# Patient Record
Sex: MALE | Race: WHITE | NOT HISPANIC OR LATINO | Employment: OTHER | URBAN - METROPOLITAN AREA
[De-identification: names, ages, dates, MRNs, and addresses within clinical notes are randomized per-mention and may not be internally consistent; named-entity substitution may affect disease eponyms.]

---

## 2023-05-22 DIAGNOSIS — E78.2 MIXED HYPERLIPIDEMIA: ICD-10-CM

## 2023-05-22 PROBLEM — I49.5 SINOATRIAL NODE DYSFUNCTION (MULTI): Status: ACTIVE | Noted: 2023-05-22

## 2023-05-22 PROBLEM — M54.12 CERVICAL RADICULOPATHY: Status: ACTIVE | Noted: 2023-05-22

## 2023-05-22 PROBLEM — H93.13 BILATERAL TINNITUS: Status: ACTIVE | Noted: 2023-05-22

## 2023-05-22 PROBLEM — H61.23 BILATERAL IMPACTED CERUMEN: Status: RESOLVED | Noted: 2023-05-22 | Resolved: 2023-05-22

## 2023-05-22 PROBLEM — C61 ADENOCARCINOMA OF PROSTATE (MULTI): Status: ACTIVE | Noted: 2023-05-22

## 2023-05-22 PROBLEM — G89.29 CHRONIC PAIN: Status: ACTIVE | Noted: 2023-05-22

## 2023-05-22 PROBLEM — R79.9 ABNORMAL BLOOD CHEMISTRY LEVEL: Status: ACTIVE | Noted: 2023-05-22

## 2023-05-22 PROBLEM — E03.8 SUBCLINICAL HYPOTHYROIDISM: Status: ACTIVE | Noted: 2023-05-22

## 2023-05-22 PROBLEM — R94.31 LONG QT INTERVAL: Status: ACTIVE | Noted: 2023-05-22

## 2023-05-22 PROBLEM — N40.1 BENIGN PROSTATIC HYPERPLASIA WITH LOWER URINARY TRACT SYMPTOMS: Status: ACTIVE | Noted: 2023-05-22

## 2023-05-22 PROBLEM — I45.81 LONG QT SYNDROME: Status: ACTIVE | Noted: 2023-05-22

## 2023-05-22 PROBLEM — H90.3 BILATERAL SENSORINEURAL HEARING LOSS: Status: ACTIVE | Noted: 2023-05-22

## 2023-05-22 PROBLEM — Z95.0 PRESENCE OF CARDIAC PACEMAKER: Status: ACTIVE | Noted: 2023-05-22

## 2023-05-22 PROBLEM — N52.9 ED (ERECTILE DYSFUNCTION): Status: ACTIVE | Noted: 2023-05-22

## 2023-05-22 PROBLEM — E55.9 VITAMIN D INSUFFICIENCY: Status: ACTIVE | Noted: 2023-05-22

## 2023-05-22 RX ORDER — SIMVASTATIN 40 MG/1
TABLET, FILM COATED ORAL
Qty: 90 TABLET | Refills: 3 | Status: SHIPPED | OUTPATIENT
Start: 2023-05-22

## 2023-05-22 RX ORDER — BICALUTAMIDE 50 MG/1
1 TABLET, FILM COATED ORAL DAILY
COMMUNITY
Start: 2021-06-10 | End: 2023-11-20 | Stop reason: SDUPTHER

## 2023-05-22 RX ORDER — ACETAMINOPHEN 500 MG
1 TABLET ORAL DAILY
COMMUNITY

## 2023-05-22 RX ORDER — HYDROCORTISONE 25 MG/G
CREAM TOPICAL 2 TIMES DAILY
COMMUNITY
Start: 2022-06-21 | End: 2023-11-24 | Stop reason: WASHOUT

## 2023-05-22 RX ORDER — ALFUZOSIN HYDROCHLORIDE 10 MG/1
1 TABLET, EXTENDED RELEASE ORAL DAILY
COMMUNITY
Start: 2023-02-14 | End: 2024-04-11 | Stop reason: SDUPTHER

## 2023-05-22 RX ORDER — SIMVASTATIN 40 MG/1
1 TABLET, FILM COATED ORAL DAILY
COMMUNITY
Start: 2015-02-11 | End: 2023-10-19 | Stop reason: WASHOUT

## 2023-10-02 PROBLEM — Z04.9 CONDITION NOT FOUND: Status: ACTIVE | Noted: 2023-10-02

## 2023-10-02 PROBLEM — R55 SYNCOPE: Status: ACTIVE | Noted: 2023-10-02

## 2023-10-02 RX ORDER — FINASTERIDE 5 MG/1
1 TABLET, FILM COATED ORAL DAILY
COMMUNITY
Start: 2023-07-21 | End: 2023-11-24 | Stop reason: SDUPTHER

## 2023-10-04 ENCOUNTER — HOSPITAL ENCOUNTER (OUTPATIENT)
Dept: CARDIOLOGY | Facility: HOSPITAL | Age: 88
Discharge: HOME | End: 2023-10-04
Payer: MEDICARE

## 2023-10-04 DIAGNOSIS — I45.81 LONG Q-T SYNDROME: ICD-10-CM

## 2023-10-04 DIAGNOSIS — I47.20 VT (VENTRICULAR TACHYCARDIA) (MULTI): ICD-10-CM

## 2023-10-05 ENCOUNTER — APPOINTMENT (OUTPATIENT)
Dept: UROLOGY | Facility: CLINIC | Age: 88
End: 2023-10-05
Payer: MEDICARE

## 2023-10-06 DIAGNOSIS — I49.5 SINOATRIAL NODE DYSFUNCTION (MULTI): ICD-10-CM

## 2023-10-06 DIAGNOSIS — Z95.0 PRESENCE OF CARDIAC PACEMAKER: Primary | ICD-10-CM

## 2023-10-19 ENCOUNTER — OFFICE VISIT (OUTPATIENT)
Dept: UROLOGY | Facility: CLINIC | Age: 88
End: 2023-10-19
Payer: MEDICARE

## 2023-10-19 VITALS
HEART RATE: 80 BPM | SYSTOLIC BLOOD PRESSURE: 100 MMHG | BODY MASS INDEX: 22.22 KG/M2 | TEMPERATURE: 98.1 F | WEIGHT: 168.4 LBS | DIASTOLIC BLOOD PRESSURE: 67 MMHG

## 2023-10-19 DIAGNOSIS — N40.1 BENIGN PROSTATIC HYPERPLASIA WITH URINARY RETENTION: Primary | ICD-10-CM

## 2023-10-19 DIAGNOSIS — R33.8 BENIGN PROSTATIC HYPERPLASIA WITH URINARY RETENTION: Primary | ICD-10-CM

## 2023-10-19 LAB
POC APPEARANCE, URINE: CLEAR
POC BILIRUBIN, URINE: NEGATIVE
POC BLOOD, URINE: NEGATIVE
POC COLOR, URINE: YELLOW
POC GLUCOSE, URINE: NEGATIVE MG/DL
POC KETONES, URINE: NEGATIVE MG/DL
POC LEUKOCYTES, URINE: NEGATIVE
POC NITRITE,URINE: NEGATIVE
POC PH, URINE: 6 PH
POC PROTEIN, URINE: NEGATIVE MG/DL
POC SPECIFIC GRAVITY, URINE: 1.02
POC UROBILINOGEN, URINE: 0.2 EU/DL

## 2023-10-19 PROCEDURE — 1126F AMNT PAIN NOTED NONE PRSNT: CPT | Performed by: NURSE PRACTITIONER

## 2023-10-19 PROCEDURE — 51798 US URINE CAPACITY MEASURE: CPT | Performed by: NURSE PRACTITIONER

## 2023-10-19 PROCEDURE — 99213 OFFICE O/P EST LOW 20 MIN: CPT | Performed by: NURSE PRACTITIONER

## 2023-10-19 PROCEDURE — 81003 URINALYSIS AUTO W/O SCOPE: CPT | Performed by: NURSE PRACTITIONER

## 2023-10-19 ASSESSMENT — ENCOUNTER SYMPTOMS
WEAKNESS: 0
FATIGUE: 0
ACTIVITY CHANGE: 0
DIFFICULTY URINATING: 0

## 2023-10-19 NOTE — PROGRESS NOTES
"UROLOGIC FOLLOW-UP VISIT     PROBLEM LIST:  1. Benign prostatic hyperplasia with urinary retention  POCT UA Automated manually resulted    Measure post void residual           HISTORY OF PRESENT ILLNESS:   Yayo Fraga Jr. is a 88 y.o. with hx oligometastatic prostate cancer on bicalutamide. Also with obstructive LUTS.  at most recent visit 7/21/23. Was taking alfuzosin \"casually\" at that time.    INTERVAL HISTORY:  Seen accompanied by daughter, Leatha  Reports he has been taking both alfuzosin and finasteride daily  Previously concerned alfuzosin was causing lethargy; denies this now  Feels he's voiding reasonably well    PAST MEDICAL HISTORY:  Past Medical History:   Diagnosis Date    Personal history of other diseases of the digestive system 01/02/2018    History of diverticulosis    Sick sinus syndrome (CMS/HCC) 01/02/2018    Sick sinus syndrome       PAST SURGICAL HISTORY:  Past Surgical History:   Procedure Laterality Date    CARDIAC PACEMAKER PLACEMENT  02/11/2015    Pacemaker Placement    COLONOSCOPY  02/11/2015    Complete Colonoscopy    HERNIA REPAIR  02/11/2015    Hernia Repair    OTHER SURGICAL HISTORY  01/02/2018    Needle Biopsy Of Prostate        ALLERGIES:   No Known Allergies     MEDICATIONS:   Current Outpatient Medications on File Prior to Visit   Medication Sig Dispense Refill    alfuzosin (Uroxatral) 10 mg 24 hr tablet Take 1 tablet (10 mg) by mouth once daily.      bicalutamide (Casodex) 50 mg tablet Take 1 tablet (50 mg total) by mouth once daily.      cholecalciferol (Vitamin D-3) 50 mcg (2,000 unit) capsule Take 1 capsule (50 mcg) by mouth once daily.      finasteride (Proscar) 5 mg tablet Take 1 tablet (5 mg) by mouth once daily.      fish oil concentrate (Omega-3) 120-180 mg capsule Omega-3 Fish Oil 1000 MG Oral Capsule  Refills: 0 [id number], APRN-CNS Leigh Start: 2-Jun-2023      hydrocortisone 2.5 % cream Apply topically 2 times a day.      LYCOPENE ORAL Lycopene Oral " Capsule  Refills: 0 [id number], APRN-CNS Leigh Start: 2-Dec-2022      simvastatin (Zocor) 40 mg tablet TAKE 1 TABLET BY MOUTH EVERY DAY 90 tablet 3    simvastatin (Zocor) 40 mg tablet Take 1 tablet (40 mg) by mouth once daily.       No current facility-administered medications on file prior to visit.        SOCIAL HISTORY:  Patient     Social History     Socioeconomic History    Marital status:      Spouse name: Not on file    Number of children: Not on file    Years of education: Not on file    Highest education level: Not on file   Occupational History    Not on file   Tobacco Use    Smoking status: Not on file    Smokeless tobacco: Not on file   Substance and Sexual Activity    Alcohol use: Not on file    Drug use: Not on file    Sexual activity: Not on file   Other Topics Concern    Not on file   Social History Narrative    Not on file     Social Determinants of Health     Financial Resource Strain: Not on file   Food Insecurity: Not on file   Transportation Needs: Not on file   Physical Activity: Not on file   Stress: Not on file   Social Connections: Not on file   Intimate Partner Violence: Not on file   Housing Stability: Not on file       FAMILY HISTORY:  Family History   Problem Relation Name Age of Onset    Alzheimer's disease Mother      Alzheimer's disease Mother's Sister      Alzheimer's disease Maternal Grandmother         REVIEW OF SYSTEMS:  Review of Systems   Constitutional:  Negative for activity change and fatigue.   Genitourinary:  Negative for difficulty urinating.   Neurological:  Negative for weakness.       PHYSICAL EXAM:  Visit Vitals  /67   Pulse 80   Temp 36.7 °C (98.1 °F)     Constitutional: Patient appears well-developed and well-nourished. No distress.    Head: Normocephalic and atraumatic.    Neck: Normal range of motion.    Cardiovascular: Normal rate.    Pulmonary/Chest: Effort normal. No respiratory distress.   Abdominal: Nondistended  : See  below  Musculoskeletal: Normal range of motion.    Neurological: Alert and oriented to person, place, and time.  Psychiatric: Normal mood and affect. Behavior is normal. Thought content normal.      RADIOLOGY REVIEW:  [unfilled]    LABORATORY REVIEW:   Lab Results   Component Value Date    BUN 26 (H) 08/28/2023    CREATININE 1.22 08/28/2023     08/28/2023    K 4.4 08/28/2023     08/28/2023    CO2 30 08/28/2023    CALCIUM 9.0 08/28/2023      Lab Results   Component Value Date    WBC 6.3 08/28/2023    RBC 4.53 08/28/2023    HGB 13.8 08/28/2023    HCT 41.5 08/28/2023    MCV 92 08/28/2023    MCHC 33.3 08/28/2023    RDW 13.2 08/28/2023     08/28/2023        Lab Results   Component Value Date    PSA 2.73 08/28/2023    PSA 4.36 (H) 08/14/2023    PSA 3.90 05/04/2023    PSA 3.78 01/31/2023    PSA 4.49 (H) 11/07/2022    PSA 5.75 (H) 07/05/2022    PSA 5.43 (H) 03/01/2022    PSA 6.02 (H) 10/08/2021    PSA 8.68 (H) 07/29/2021    PSA 11.86 (H) 07/08/2021    PSA 34.67 (H) 05/26/2021    PSA 28.16 (H) 02/03/2021    PSA 22.52 (H) 11/19/2020    PSA 23.83 (H) 08/07/2020    PSA 20.92 (H) 06/19/2020    PSA 21.36 (H) 02/07/2020    PSA 23.97 (H) 01/31/2020    PSA 22.91 (H) 11/18/2019    PSA 26.08 (H) 07/31/2019    PSA 21.76 (H) 01/22/2019    PSA 20.95 (H) 08/06/2018    PSA 20.74 (H) 05/14/2018    PSA 22.97 (H) 01/30/2018    PSA 21.71 (H) 11/02/2017        Urine dipstick shows negative for all components.         Assessment:      1. Benign prostatic hyperplasia with urinary retention  POCT UA Automated manually resulted    Measure post void residual          Yayo Flakito Mcclure is a 88 y.o. with hx oligometastatic prostate cancer on bicalutamide. Also with obstructive LUTS.  at most recent visit 7/21/23; 77 today. PSA improved as above; continues to follow with oncology.     Plan:   Continue alfuzosin & finasteride daily  RTC in 6 months  Encouraged to call in the interim with any questions, concerns

## 2023-11-20 ENCOUNTER — TELEPHONE (OUTPATIENT)
Dept: ADMISSION | Facility: HOSPITAL | Age: 88
End: 2023-11-20
Payer: MEDICARE

## 2023-11-20 ENCOUNTER — APPOINTMENT (OUTPATIENT)
Dept: HEMATOLOGY/ONCOLOGY | Facility: HOSPITAL | Age: 88
End: 2023-11-20
Payer: MEDICARE

## 2023-11-20 DIAGNOSIS — C61 ADENOCARCINOMA OF PROSTATE (MULTI): Primary | ICD-10-CM

## 2023-11-20 RX ORDER — BICALUTAMIDE 50 MG/1
50 TABLET, FILM COATED ORAL DAILY
Qty: 30 TABLET | Refills: 11 | Status: SHIPPED | OUTPATIENT
Start: 2023-11-20 | End: 2023-11-24 | Stop reason: SDUPTHER

## 2023-11-21 ENCOUNTER — TELEPHONE (OUTPATIENT)
Dept: UROLOGY | Facility: CLINIC | Age: 88
End: 2023-11-21
Payer: MEDICARE

## 2023-11-21 DIAGNOSIS — R33.8 BENIGN PROSTATIC HYPERPLASIA WITH URINARY RETENTION: Primary | ICD-10-CM

## 2023-11-21 DIAGNOSIS — N40.1 BENIGN PROSTATIC HYPERPLASIA WITH URINARY RETENTION: Primary | ICD-10-CM

## 2023-11-21 NOTE — PROGRESS NOTES
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 Arroyo, declined additional therapy and remained on Casodex  alone, PSA 1/23 3.78, 5/23 3.9     Provider Team  No ref. provider found   MD Kendall Salinas  PCP - Lupis Valente  EP / Cardiology f/u   Urology [facility]     Other contributing history  -Hypothyroidism, sick sinus syndrome, pacemaker, osteopenia, long qt syndrome, urinary incontinence ( finasteride, alfuzosin)       Interval history:  The patient presents for follow up.  Accompanied by his daughter, still some confusion yet notes no major new complaints.  He notes since starting back on the finasteride and the alfuzosin, some improvement of his urinary symptoms.  Denies any ongoing complications with the bicalutamide.  Denies any other major new symptoms.  Denies any nausea, vomiting, fevers, chills.    ROS:  10-pt ROS reviewed and negative except as mentioned above.    Medications: below was reviewed and is accurate  Current Outpatient Medications   Medication Instructions    alfuzosin (Uroxatral) 10 mg 24 hr tablet 1 tablet, oral, Daily    bicalutamide (CASODEX) 50 mg, oral, Daily    cholecalciferol (Vitamin D-3) 50 mcg (2,000 unit) capsule 1 capsule, oral, Daily    finasteride (Proscar) 5 mg tablet 1  tablet, oral, Daily    fish oil concentrate (Omega-3) 120-180 mg capsule Omega-3 Fish Oil 1000 MG Oral Capsule  Refills: 0 CORNEL Zhong-LOULOU Leigh Start: 2-Jun-2023    hydrocortisone 2.5 % cream Topical, 2 times daily    LYCOPENE ORAL Lycopene Oral Capsule  Refills: 0 NICKOLAS Zhong Leigh Start: 2-Dec-2022    simvastatin (Zocor) 40 mg tablet TAKE 1 TABLET BY MOUTH EVERY DAY        Detailed family history and social history reviewed in detail and updated per epic charting and in detail with the patient    Physical exam:  Vitals:    11/24/23 1211   BP: 126/65   BP Location: Left arm   Patient Position: Sitting   BP Cuff Size: Adult   Pulse: 66   Resp: 18   Temp: 36 °C (96.8 °F)   TempSrc: Temporal   SpO2: 99%   Weight: 75.4 kg (166 lb 3.6 oz)       GEN: NAD, well-appearing confusion yet able to answer questions  HEENT: no clear lesions seen  NECK: no clear masses  LYMPH: no palpable adenopathy  SKIN: no concerning new lesions  LUNGS: CTA  CV: RRR no clear R/G  ABD: Soft, NTND. No rebound or guarding.  EXT:  trace edema bilaterally   NEURO: Grossly intact. No focal deficits.  PSYCH: Normal mood and affect    Radiology review  Reviewed in detail personally and for detail see results in EPIC        Genomics Data    Laboratory review  Reviewed in detail personally and for detail see results in [facility]  Lab Results   Component Value Date    WBC 6.9 11/22/2023    WBC 6.3 08/28/2023    WBC 7.1 08/14/2023     Lab Results   Component Value Date    HGB 13.9 11/22/2023    HGB 13.8 08/28/2023    HGB 13.9 08/14/2023     Lab Results   Component Value Date     11/22/2023     08/28/2023     08/14/2023     Lab Results   Component Value Date    GLUCOSE 102 (H) 11/22/2023    CALCIUM 8.9 11/22/2023     11/22/2023    K 4.1 11/22/2023    CO2 29 11/22/2023     11/22/2023    BUN 19 11/22/2023    CREATININE 1.26 11/22/2023     Lab Results   Component Value Date    PSA 1.13 11/22/2023    PSA 2.73  08/28/2023    PSA 4.36 (H) 08/14/2023     Lab Results   Component Value Date    ALT 8 (L) 11/22/2023    AST 15 11/22/2023    ALKPHOS 52 11/22/2023    BILITOT 0.9 11/22/2023     Lab Results   Component Value Date    TESTOSTERONE 920 11/22/2023         ASSESSMENT AND PLAN     PCA -the PSA has improved again no change in therapy wants to continue on the Casodex the decrease in the PSA could have been potentially related also to the finasteride.  Discussed the of escalating therapy including ADT and/or repeat imaging risk benefits and alternatives and for now due to his age and other comorbidities wanted to just stay on the Casodex continue to monitor toxicities I will arrange a follow-up in 6 months and arrange labs prior he will contact us for any other symptoms.  CT findings - role of repeat imaging discussed in regards to nonspecific pulmonary and pancreas findings and refused intervention.  Prefers to monitor aware of the risk of progression no worsening symptoms  LUTS again continue follow-up with urology on a regular basis and ongoing use of medications.  With benefits of the finasteride.      Burt Arroyo MD  Senior Attending Physician/  in Medicine [employer]  [facility] / [facility]  Patient line [phone]  Fax [phone]

## 2023-11-21 NOTE — TELEPHONE ENCOUNTER
"Jarett Mohanrodriguez,    Patient's wife is calling and asking for a med refill     finasteride (Proscar) 5 mg tablet [[id number]]    Order Details  Dose: 1 tablet Route: oral Frequency: Daily   Dispense Quantity: -- Refills: --          Sig: Take 1 tablet (5 mg) by mouth once daily.     I cannot be sure if you were actually the one to order it since due to the EPIC transition it just says \"historical\"    Last ordered 7/21/23  "

## 2023-11-22 ENCOUNTER — LAB (OUTPATIENT)
Dept: LAB | Facility: HOSPITAL | Age: 88
End: 2023-11-22
Payer: MEDICARE

## 2023-11-22 DIAGNOSIS — C61 PROSTATE CANCER (MULTI): Primary | ICD-10-CM

## 2023-11-22 DIAGNOSIS — C61 PROSTATE CANCER (MULTI): ICD-10-CM

## 2023-11-22 LAB
ALBUMIN SERPL BCP-MCNC: 4.1 G/DL (ref 3.4–5)
ALP SERPL-CCNC: 52 U/L (ref 33–136)
ALT SERPL W P-5'-P-CCNC: 8 U/L (ref 10–52)
ANION GAP SERPL CALC-SCNC: 11 MMOL/L (ref 10–20)
AST SERPL W P-5'-P-CCNC: 15 U/L (ref 9–39)
BASOPHILS # BLD AUTO: 0.03 X10*3/UL (ref 0–0.1)
BASOPHILS NFR BLD AUTO: 0.4 %
BILIRUB SERPL-MCNC: 0.9 MG/DL (ref 0–1.2)
BUN SERPL-MCNC: 19 MG/DL (ref 6–23)
CALCIUM SERPL-MCNC: 8.9 MG/DL (ref 8.6–10.3)
CHLORIDE SERPL-SCNC: 104 MMOL/L (ref 98–107)
CO2 SERPL-SCNC: 29 MMOL/L (ref 21–32)
CREAT SERPL-MCNC: 1.26 MG/DL (ref 0.5–1.3)
EOSINOPHIL # BLD AUTO: 0.19 X10*3/UL (ref 0–0.4)
EOSINOPHIL NFR BLD AUTO: 2.7 %
ERYTHROCYTE [DISTWIDTH] IN BLOOD BY AUTOMATED COUNT: 13.2 % (ref 11.5–14.5)
GFR SERPL CREATININE-BSD FRML MDRD: 55 ML/MIN/1.73M*2
GLUCOSE SERPL-MCNC: 102 MG/DL (ref 74–99)
HCT VFR BLD AUTO: 41.8 % (ref 41–52)
HGB BLD-MCNC: 13.9 G/DL (ref 13.5–17.5)
IMM GRANULOCYTES # BLD AUTO: 0.02 X10*3/UL (ref 0–0.5)
IMM GRANULOCYTES NFR BLD AUTO: 0.3 % (ref 0–0.9)
LYMPHOCYTES # BLD AUTO: 2.19 X10*3/UL (ref 0.8–3)
LYMPHOCYTES NFR BLD AUTO: 31.6 %
MCH RBC QN AUTO: 30.3 PG (ref 26–34)
MCHC RBC AUTO-ENTMCNC: 33.3 G/DL (ref 32–36)
MCV RBC AUTO: 91 FL (ref 80–100)
MONOCYTES # BLD AUTO: 0.67 X10*3/UL (ref 0.05–0.8)
MONOCYTES NFR BLD AUTO: 9.7 %
NEUTROPHILS # BLD AUTO: 3.84 X10*3/UL (ref 1.6–5.5)
NEUTROPHILS NFR BLD AUTO: 55.3 %
NRBC BLD-RTO: 0 /100 WBCS (ref 0–0)
PLATELET # BLD AUTO: 179 X10*3/UL (ref 150–450)
POTASSIUM SERPL-SCNC: 4.1 MMOL/L (ref 3.5–5.3)
PROT SERPL-MCNC: 6.9 G/DL (ref 6.4–8.2)
PSA SERPL-MCNC: 1.13 NG/ML
RBC # BLD AUTO: 4.59 X10*6/UL (ref 4.5–5.9)
SODIUM SERPL-SCNC: 140 MMOL/L (ref 136–145)
TESTOST SERPL-MCNC: 920 NG/DL (ref 240–1000)
WBC # BLD AUTO: 6.9 X10*3/UL (ref 4.4–11.3)

## 2023-11-22 PROCEDURE — 84153 ASSAY OF PSA TOTAL: CPT | Performed by: INTERNAL MEDICINE

## 2023-11-22 PROCEDURE — 85025 COMPLETE CBC W/AUTO DIFF WBC: CPT

## 2023-11-22 PROCEDURE — 36415 COLL VENOUS BLD VENIPUNCTURE: CPT

## 2023-11-22 PROCEDURE — 80053 COMPREHEN METABOLIC PANEL: CPT

## 2023-11-22 PROCEDURE — 84403 ASSAY OF TOTAL TESTOSTERONE: CPT | Performed by: INTERNAL MEDICINE

## 2023-11-24 ENCOUNTER — TELEPHONE (OUTPATIENT)
Dept: HEMATOLOGY/ONCOLOGY | Facility: HOSPITAL | Age: 88
End: 2023-11-24
Payer: MEDICARE

## 2023-11-24 ENCOUNTER — OFFICE VISIT (OUTPATIENT)
Dept: HEMATOLOGY/ONCOLOGY | Facility: HOSPITAL | Age: 88
End: 2023-11-24
Payer: MEDICARE

## 2023-11-24 VITALS
OXYGEN SATURATION: 99 % | HEART RATE: 66 BPM | DIASTOLIC BLOOD PRESSURE: 65 MMHG | WEIGHT: 166.23 LBS | TEMPERATURE: 96.8 F | BODY MASS INDEX: 21.93 KG/M2 | SYSTOLIC BLOOD PRESSURE: 126 MMHG | RESPIRATION RATE: 18 BRPM

## 2023-11-24 DIAGNOSIS — C61 ADENOCARCINOMA OF PROSTATE (MULTI): Primary | ICD-10-CM

## 2023-11-24 PROCEDURE — 1159F MED LIST DOCD IN RCRD: CPT | Performed by: INTERNAL MEDICINE

## 2023-11-24 PROCEDURE — 99214 OFFICE O/P EST MOD 30 MIN: CPT | Performed by: INTERNAL MEDICINE

## 2023-11-24 PROCEDURE — 1036F TOBACCO NON-USER: CPT | Performed by: INTERNAL MEDICINE

## 2023-11-24 PROCEDURE — 1126F AMNT PAIN NOTED NONE PRSNT: CPT | Performed by: INTERNAL MEDICINE

## 2023-11-24 PROCEDURE — 1160F RVW MEDS BY RX/DR IN RCRD: CPT | Performed by: INTERNAL MEDICINE

## 2023-11-24 RX ORDER — BICALUTAMIDE 50 MG/1
50 TABLET, FILM COATED ORAL DAILY
Qty: 30 TABLET | Refills: 11 | Status: SHIPPED | OUTPATIENT
Start: 2023-11-24

## 2023-11-24 RX ORDER — FINASTERIDE 5 MG/1
5 TABLET, FILM COATED ORAL DAILY
Qty: 90 TABLET | Refills: 3 | Status: SHIPPED | OUTPATIENT
Start: 2023-11-24

## 2023-11-24 ASSESSMENT — PAIN SCALES - GENERAL: PAINLEVEL: 0-NO PAIN

## 2023-11-24 ASSESSMENT — ENCOUNTER SYMPTOMS
LOSS OF SENSATION IN FEET: 0
DEPRESSION: 0
OCCASIONAL FEELINGS OF UNSTEADINESS: 0

## 2023-11-24 NOTE — TELEPHONE ENCOUNTER
Called patient and spoke to wife about the option to come a little earlier for patient appointment today with Dr. Arroyo. Patient's wife said they needed to confirm with their daughter, but appreciated the option. She said she will call back to confirm if they will come a little earlier. Office number provided to her.

## 2023-12-07 ENCOUNTER — OFFICE VISIT (OUTPATIENT)
Dept: GERIATRIC MEDICINE | Facility: CLINIC | Age: 88
End: 2023-12-07
Payer: MEDICARE

## 2023-12-07 VITALS
BODY MASS INDEX: 22.15 KG/M2 | DIASTOLIC BLOOD PRESSURE: 60 MMHG | WEIGHT: 167.9 LBS | HEART RATE: 60 BPM | SYSTOLIC BLOOD PRESSURE: 90 MMHG

## 2023-12-07 DIAGNOSIS — M54.12 CERVICAL RADICULOPATHY: ICD-10-CM

## 2023-12-07 DIAGNOSIS — E03.8 SUBCLINICAL HYPOTHYROIDISM: Primary | ICD-10-CM

## 2023-12-07 DIAGNOSIS — R79.9 ABNORMAL BLOOD CHEMISTRY LEVEL: ICD-10-CM

## 2023-12-07 DIAGNOSIS — C61 ADENOCARCINOMA OF PROSTATE (MULTI): ICD-10-CM

## 2023-12-07 PROCEDURE — 1159F MED LIST DOCD IN RCRD: CPT | Performed by: NURSE PRACTITIONER

## 2023-12-07 PROCEDURE — 84443 ASSAY THYROID STIM HORMONE: CPT | Performed by: NURSE PRACTITIONER

## 2023-12-07 PROCEDURE — 84439 ASSAY OF FREE THYROXINE: CPT | Performed by: NURSE PRACTITIONER

## 2023-12-07 PROCEDURE — 36415 COLL VENOUS BLD VENIPUNCTURE: CPT | Mod: PO | Performed by: NURSE PRACTITIONER

## 2023-12-07 PROCEDURE — 1126F AMNT PAIN NOTED NONE PRSNT: CPT | Performed by: NURSE PRACTITIONER

## 2023-12-07 PROCEDURE — 80053 COMPREHEN METABOLIC PANEL: CPT | Performed by: NURSE PRACTITIONER

## 2023-12-07 PROCEDURE — 1036F TOBACCO NON-USER: CPT | Performed by: NURSE PRACTITIONER

## 2023-12-07 PROCEDURE — 99213 OFFICE O/P EST LOW 20 MIN: CPT | Performed by: NURSE PRACTITIONER

## 2023-12-07 PROCEDURE — 1160F RVW MEDS BY RX/DR IN RCRD: CPT | Performed by: NURSE PRACTITIONER

## 2023-12-07 PROCEDURE — 99213 OFFICE O/P EST LOW 20 MIN: CPT | Mod: PO | Performed by: NURSE PRACTITIONER

## 2023-12-07 PROCEDURE — 85027 COMPLETE CBC AUTOMATED: CPT | Performed by: NURSE PRACTITIONER

## 2023-12-07 PROCEDURE — 82607 VITAMIN B-12: CPT | Performed by: NURSE PRACTITIONER

## 2023-12-07 ASSESSMENT — ENCOUNTER SYMPTOMS
SINUS PAIN: 0
DYSPHORIC MOOD: 0
CONFUSION: 0
CHOKING: 0
SHORTNESS OF BREATH: 0
SLEEP DISTURBANCE: 0
CONSTIPATION: 0
HEADACHES: 0
FREQUENCY: 0
LOSS OF SENSATION IN FEET: 0
PALPITATIONS: 0
UNEXPECTED WEIGHT CHANGE: 0
EYE DISCHARGE: 0
LIGHT-HEADEDNESS: 0
EYE ITCHING: 0
DIZZINESS: 0
OCCASIONAL FEELINGS OF UNSTEADINESS: 0
FEVER: 0
COUGH: 0
DYSURIA: 0
SINUS PRESSURE: 0
NECK PAIN: 1
DIARRHEA: 0
DEPRESSION: 0
DIFFICULTY URINATING: 0

## 2023-12-07 ASSESSMENT — PATIENT HEALTH QUESTIONNAIRE - PHQ9
2. FEELING DOWN, DEPRESSED OR HOPELESS: NOT AT ALL
SUM OF ALL RESPONSES TO PHQ9 QUESTIONS 1 AND 2: 0
1. LITTLE INTEREST OR PLEASURE IN DOING THINGS: NOT AT ALL

## 2023-12-07 NOTE — PROGRESS NOTES
"Subjective   Mr. Fraga 88 y.o. year old male who presents for follow up Geriatric visit, he also states he is now followed by [facility] for primary care  Consult Requested By: self  Reason for consultation or primary concern: geriatric follow up       PCP: Dr. Can Feldman- no longer following with Dr. Feldman    Last seen by Leigh Barrera CNP on 6/2/23. Per patient discussion/provider impressions;  1. Bilateral sensorineural hearing loss: Seen by ENT, had MRI to evaluate for tumor, MRI was unremarkable. Hearing was okay at time of exam  2. Adenocarcinoma of prostate: Now taking White Button Mushroom for supplement, followed by heme-onc. He continues on afluzosin and bicautiamide.  3. Chronic back and neck pain: has medical massage every 3 weeks with good results     Since last visit:   Seen by urology: PSA improved, last PVR: 77, no change to medications, follow up in 6 months (last visit was in June 2023).  Seen by heme-onc, Dr. Travis on 11/24/23: no changes to medications, follow up in 6 months.   Concerns today:  Notes tremor of hands, right more than left, paticularly when working with small objects, \"using my fine motor skills.\" Symptoms present for \"months.\" No concerns with daily ADLs, eating etc.. no spilling liquids. No other concerns     HPI     What matters most: staying active   Health Goals: staying active    Goals    None         Visual: glasses Yes Last exam: recent   Hearing: hearing Yes Last exam: followed by ENT- has hearing aids  Dental: dentures No Last exam: unsure     Sleep: how many hours at night no concerns with sleep , sleep aides: none    Is dependant or requires assistance in the following BADL: independent with all   Is dependant or requires assistance in the following Instrumental ADL: independent with all       Advanced Directives on file: <no information>    Review of Systems   Constitutional:  Negative for activity change, appetite change, fever and unexpected weight change. "   HENT:  Negative for drooling, sinus pressure and sinus pain.         Hearing aids are helping. MRI was negative for auditory tumor. Had recent molar removal- no difficulty chewing or swallowing after dental procedure   Eyes:  Negative for discharge and itching.        Wears eyeglasses    Respiratory:  Negative for cough, choking and shortness of breath.    Cardiovascular:  Negative for chest pain, palpitations and leg swelling.   Gastrointestinal:  Negative for constipation and diarrhea.   Genitourinary:  Negative for difficulty urinating, dysuria, frequency and urgency.        Followed by urology and heme-onc    Musculoskeletal:  Positive for neck pain.   Neurological:  Negative for dizziness, light-headedness and headaches.   Psychiatric/Behavioral:  Negative for behavioral problems, confusion, dysphoric mood and sleep disturbance.         Reports he has no concerns with his memory but needs to write things down    Voids 2x/night   Drinks 1 beer per week.     Objective   VS: 90/60, 60, 16     Physical Exam  Constitutional:       Appearance: Normal appearance.   HENT:      Head: Normocephalic and atraumatic.      Nose: Nose normal.   Eyes:      Extraocular Movements: Extraocular movements intact.      Conjunctiva/sclera: Conjunctivae normal.      Pupils: Pupils are equal, round, and reactive to light.   Cardiovascular:      Rate and Rhythm: Normal rate and regular rhythm.   Pulmonary:      Effort: Pulmonary effort is normal.   Abdominal:      General: Abdomen is flat. Bowel sounds are normal.      Palpations: Abdomen is soft.   Genitourinary:     Comments: Treated for BPH and adenocarcinoma of prostate. Symptoms well controlled. He is voiding 2x/night.  Musculoskeletal:         General: Normal range of motion.   Skin:     General: Skin is warm and dry.   Neurological:      General: No focal deficit present.      Mental Status: He is alert and oriented to person, place, and time.      Gait: Gait normal.       Comments: Bilateral hand tremor with movement, R>L. No tenderness in wrists, hand grasps equal, sensation intact to light touch, finger to nose testing intact, rapid alternating movements intact.    Psychiatric:         Mood and Affect: Mood normal.         Thought Content: Thought content normal.         Assessment/Plan   Bilateral sensorineural hearing loss/bilateral tinnitus  - MRI negative   - Mr. Fraga is now wearing hearing aides, he finds these helpful  - no concerns with tinnitus today  - continue to follow with ENT     Chronic pain/cervical radiculopathy  - medical massage provides relief   - new prescription will be provided, he sees a practitioner on [facility] for this    Combined hyperlipidemia  - on statin  - follow lipid panel    BPH/adenocarcinoma of prostate   - continues on alfuzosin, finasteride and bicaliutamide  - last seen by urology in June, PSA was improved, minimal PVR,  - Mr. Fraga's symptoms are stable  - followed by Dr. Arroyo for prostate CA, last seen 11/24/23 - no changes to medications, plan is for follow up in 6 months     Long QT interval and syndrome, sinoatrial node dysfunction  - has pacemaker in place  - no CV complaints     Vitamin D deficiency  - on supplement    Subclinical hypothyroid  - check thyroid function today     Tremor  - tremor noted with movement in both hands, primarily with fine motor movements  - otherwise neuro exam is unremarkable, no PD symptoms, no resting tremor  - tremor does not interfere with function  - check labs, if symptoms persist or increase, consider referral to neurology     Health Maintenance  - Has had influenza and RSV vaccines  - I recommended COVID vaccine and pneumonia vaccine    Follow up in 6 months or sooner if needed.

## 2023-12-07 NOTE — PATIENT INSTRUCTIONS
Hearing loss  - I am glad your MRI was normal  - Continue with hearing aids    Tremor  - you have been noting a tremor of both hand, right more than left for more than a year, you note it is associated with movement  - I will check blood work today to rule out any vitamin or metabolic changes  - if tremor persists I will refer you to neurology (movement specialist) for more work up    Adenocarcinoma of prostate  - you are being followed by Dr. Arroyo  - continue your medications     BPH  - no concerns with your ability to void    Subclinical hypothyroid  - I am checking your thyroid today    Chronic pain  - I have sent a prescription for your medical massage       Health Maintenance  - you have received the influenza and RSV vaccine  - I recommend a pneumonia vaccine and the updated COVID booster

## 2023-12-08 LAB
ALBUMIN SERPL BCP-MCNC: 4.3 G/DL (ref 3.4–5)
ALP SERPL-CCNC: 56 U/L (ref 33–136)
ALT SERPL W P-5'-P-CCNC: 7 U/L (ref 10–52)
ANION GAP SERPL CALC-SCNC: 13 MMOL/L (ref 10–20)
AST SERPL W P-5'-P-CCNC: 17 U/L (ref 9–39)
BILIRUB SERPL-MCNC: 0.8 MG/DL (ref 0–1.2)
BUN SERPL-MCNC: 20 MG/DL (ref 6–23)
CALCIUM SERPL-MCNC: 9.1 MG/DL (ref 8.6–10.6)
CHLORIDE SERPL-SCNC: 102 MMOL/L (ref 98–107)
CO2 SERPL-SCNC: 29 MMOL/L (ref 21–32)
CREAT SERPL-MCNC: 1.32 MG/DL (ref 0.5–1.3)
ERYTHROCYTE [DISTWIDTH] IN BLOOD BY AUTOMATED COUNT: 13.3 % (ref 11.5–14.5)
GFR SERPL CREATININE-BSD FRML MDRD: 52 ML/MIN/1.73M*2
GLUCOSE SERPL-MCNC: 94 MG/DL (ref 74–99)
HCT VFR BLD AUTO: 43 % (ref 41–52)
HGB BLD-MCNC: 13.8 G/DL (ref 13.5–17.5)
MCH RBC QN AUTO: 30.2 PG (ref 26–34)
MCHC RBC AUTO-ENTMCNC: 32.1 G/DL (ref 32–36)
MCV RBC AUTO: 94 FL (ref 80–100)
NRBC BLD-RTO: 0 /100 WBCS (ref 0–0)
PLATELET # BLD AUTO: 181 X10*3/UL (ref 150–450)
POTASSIUM SERPL-SCNC: 4.5 MMOL/L (ref 3.5–5.3)
PROT SERPL-MCNC: 7.4 G/DL (ref 6.4–8.2)
RBC # BLD AUTO: 4.57 X10*6/UL (ref 4.5–5.9)
SODIUM SERPL-SCNC: 139 MMOL/L (ref 136–145)
T4 FREE SERPL-MCNC: 0.88 NG/DL (ref 0.78–1.48)
TSH SERPL-ACNC: 4.23 MIU/L (ref 0.44–3.98)
VIT B12 SERPL-MCNC: 309 PG/ML (ref 211–911)
WBC # BLD AUTO: 10 X10*3/UL (ref 4.4–11.3)

## 2023-12-08 ASSESSMENT — ENCOUNTER SYMPTOMS
ACTIVITY CHANGE: 0
APPETITE CHANGE: 0

## 2023-12-11 ENCOUNTER — TELEPHONE (OUTPATIENT)
Dept: GERIATRIC MEDICINE | Facility: CLINIC | Age: 88
End: 2023-12-11
Payer: MEDICARE

## 2023-12-14 ENCOUNTER — TELEPHONE (OUTPATIENT)
Dept: GERIATRIC MEDICINE | Facility: CLINIC | Age: 88
End: 2023-12-14
Payer: MEDICARE

## 2024-01-04 ENCOUNTER — DOCUMENTATION (OUTPATIENT)
Dept: GERIATRIC MEDICINE | Facility: CLINIC | Age: 89
End: 2024-01-04
Payer: MEDICARE

## 2024-01-04 NOTE — PROGRESS NOTES
Call from wife requesting prescription for medical massage be updated to every 3 weeks, from monthly, no other concerns.     Plan:    Prescription mailed to Mr. Fraga's home for medical massage due to chronic neck and back pain, every 3 weeks.

## 2024-04-03 ENCOUNTER — HOSPITAL ENCOUNTER (OUTPATIENT)
Dept: CARDIOLOGY | Facility: HOSPITAL | Age: 89
Discharge: HOME | End: 2024-04-03
Payer: MEDICARE

## 2024-04-03 DIAGNOSIS — R00.1 BRADYCARDIA ON ECG: ICD-10-CM

## 2024-04-03 DIAGNOSIS — R55 SYNCOPE AND COLLAPSE: ICD-10-CM

## 2024-04-03 DIAGNOSIS — Z95.0 PACEMAKER: ICD-10-CM

## 2024-04-03 DIAGNOSIS — Z95.0 PACEMAKER: Primary | ICD-10-CM

## 2024-04-03 PROCEDURE — 93280 PM DEVICE PROGR EVAL DUAL: CPT | Performed by: NURSE PRACTITIONER

## 2024-04-03 PROCEDURE — 93280 PM DEVICE PROGR EVAL DUAL: CPT

## 2024-04-11 ENCOUNTER — OFFICE VISIT (OUTPATIENT)
Dept: UROLOGY | Facility: CLINIC | Age: 89
End: 2024-04-11
Payer: MEDICARE

## 2024-04-11 VITALS — WEIGHT: 167 LBS | TEMPERATURE: 96.1 F | HEIGHT: 73 IN | BODY MASS INDEX: 22.13 KG/M2

## 2024-04-11 DIAGNOSIS — N40.1 BENIGN PROSTATIC HYPERPLASIA WITH LOWER URINARY TRACT SYMPTOMS, SYMPTOM DETAILS UNSPECIFIED: Primary | ICD-10-CM

## 2024-04-11 LAB
POC APPEARANCE, URINE: CLEAR
POC BILIRUBIN, URINE: NEGATIVE
POC BLOOD, URINE: NEGATIVE
POC COLOR, URINE: YELLOW
POC GLUCOSE, URINE: NEGATIVE MG/DL
POC KETONES, URINE: NEGATIVE MG/DL
POC LEUKOCYTES, URINE: NEGATIVE
POC NITRITE,URINE: NEGATIVE
POC PH, URINE: 7 PH
POC PROTEIN, URINE: ABNORMAL MG/DL
POC SPECIFIC GRAVITY, URINE: 1.02
POC UROBILINOGEN, URINE: 0.2 EU/DL

## 2024-04-11 PROCEDURE — 81002 URINALYSIS NONAUTO W/O SCOPE: CPT | Performed by: NURSE PRACTITIONER

## 2024-04-11 PROCEDURE — 1126F AMNT PAIN NOTED NONE PRSNT: CPT | Performed by: NURSE PRACTITIONER

## 2024-04-11 PROCEDURE — 1159F MED LIST DOCD IN RCRD: CPT | Performed by: NURSE PRACTITIONER

## 2024-04-11 PROCEDURE — 1036F TOBACCO NON-USER: CPT | Performed by: NURSE PRACTITIONER

## 2024-04-11 PROCEDURE — 51798 US URINE CAPACITY MEASURE: CPT | Performed by: NURSE PRACTITIONER

## 2024-04-11 PROCEDURE — 1157F ADVNC CARE PLAN IN RCRD: CPT | Performed by: NURSE PRACTITIONER

## 2024-04-11 PROCEDURE — 99214 OFFICE O/P EST MOD 30 MIN: CPT | Performed by: NURSE PRACTITIONER

## 2024-04-11 RX ORDER — ALFUZOSIN HYDROCHLORIDE 10 MG/1
10 TABLET, EXTENDED RELEASE ORAL DAILY
Qty: 90 TABLET | Refills: 3 | Status: SHIPPED | OUTPATIENT
Start: 2024-04-11

## 2024-04-11 ASSESSMENT — PAIN SCALES - GENERAL: PAINLEVEL: 0-NO PAIN

## 2024-04-11 NOTE — PROGRESS NOTES
UROLOGIC FOLLOW-UP VISIT     PROBLEM LIST:  1. Benign prostatic hyperplasia with lower urinary tract symptoms, symptom details unspecified  Measure post void residual           HISTORY OF PRESENT ILLNESS:   Yayo Fraga Jr. is an 88 y.o. with hx oligometastatic prostate cancer on bicalutamide. Also with obstructive LUTS.     INTERVAL HISTORY:  Returns for FUV  Seen accompanied by daughter, Leatha  Tried going off alfuzosin for a week, noted increased straining  Stream is slow even on medication  No leakage, hematuria  NTF ~1     PAST MEDICAL HISTORY:  Past Medical History:   Diagnosis Date    Personal history of other diseases of the digestive system 01/02/2018    History of diverticulosis    Sick sinus syndrome (CMS/HCC) 01/02/2018    Sick sinus syndrome       PAST SURGICAL HISTORY:  Past Surgical History:   Procedure Laterality Date    CARDIAC PACEMAKER PLACEMENT  02/11/2015    Pacemaker Placement    COLONOSCOPY  02/11/2015    Complete Colonoscopy    HERNIA REPAIR  02/11/2015    Hernia Repair    OTHER SURGICAL HISTORY  01/02/2018    Needle Biopsy Of Prostate        ALLERGIES:   No Known Allergies     MEDICATIONS:   Current Outpatient Medications on File Prior to Visit   Medication Sig Dispense Refill    alfuzosin (Uroxatral) 10 mg 24 hr tablet Take 1 tablet (10 mg) by mouth once daily.      bicalutamide (Casodex) 50 mg tablet Take 1 tablet (50 mg total) by mouth once daily. 30 tablet 11    cholecalciferol (Vitamin D-3) 50 mcg (2,000 unit) capsule Take 1 capsule (50 mcg) by mouth once daily.      finasteride (Proscar) 5 mg tablet Take 1 tablet (5 mg) by mouth once daily. 90 tablet 3    fish oil concentrate (Omega-3) 120-180 mg capsule Omega-3 Fish Oil 1000 MG Oral Capsule  Refills: 0 [id number] Wong-CNS Leigh Start: 2-Jun-2023      LYCOPENE ORAL Lycopene Oral Capsule  Refills: 0 CORNEL Zhong-CNS Leigh Start: 2-Dec-2022      simvastatin (Zocor) 40 mg tablet TAKE 1 TABLET BY MOUTH EVERY DAY 90 tablet 3      No current facility-administered medications on file prior to visit.        SOCIAL HISTORY:  Patient  reports that he has never smoked. He has never used smokeless tobacco.   Social History     Socioeconomic History    Marital status:      Spouse name: Not on file    Number of children: Not on file    Years of education: Not on file    Highest education level: Not on file   Occupational History    Not on file   Tobacco Use    Smoking status: Never    Smokeless tobacco: Never   Substance and Sexual Activity    Alcohol use: Not on file    Drug use: Not on file    Sexual activity: Not on file   Other Topics Concern    Not on file   Social History Narrative    Not on file     Social Determinants of Health     Financial Resource Strain: Not on file   Food Insecurity: Not on file   Transportation Needs: Not on file   Physical Activity: Not on file   Stress: Not on file   Social Connections: Not on file   Intimate Partner Violence: Not on file   Housing Stability: Not on file       FAMILY HISTORY:  Family History   Problem Relation Name Age of Onset    Alzheimer's disease Mother      Alzheimer's disease Mother's Sister      Alzheimer's disease Maternal Grandmother         REVIEW OF SYSTEMS:  All systems reviewed, pertinent negatives as noted in HPI.     PHYSICAL EXAM:  Visit Vitals  Temp 35.6 °C (96.1 °F)   Constitutional: Appears well-developed and well-nourished. No distress.    Head: Normocephalic and atraumatic.    Neck: Normal range of motion.    Pulmonary/Chest: Effort normal. No respiratory distress.   Abdominal: Nondistended  : See below  Integumentary: No rash or lesions.  Musculoskeletal: Normal range of motion.    Neurological: Alert and oriented. +[place]  Psychiatric: Normal mood and affect. Thought content normal.      LABORATORY REVIEW:   Lab Results   Component Value Date    BUN 20 12/07/2023    CREATININE 1.32 (H) 12/07/2023    EGFR 52 (L) 12/07/2023     12/07/2023    K 4.5 12/07/2023    CL  102 12/07/2023    CO2 29 12/07/2023    CALCIUM 9.1 12/07/2023      Lab Results   Component Value Date    WBC 10.0 12/07/2023    RBC 4.57 12/07/2023    HGB 13.8 12/07/2023    HCT 43.0 12/07/2023    MCV 94 12/07/2023    MCH 30.2 12/07/2023    MCHC 32.1 12/07/2023    RDW 13.3 12/07/2023     12/07/2023        Lab Results   Component Value Date    PSA 1.13 11/22/2023    PSA 2.73 08/28/2023    PSA 4.36 (H) 08/14/2023    PSA 3.90 05/04/2023    PSA 3.78 01/31/2023    PSA 4.49 (H) 11/07/2022    PSA 5.75 (H) 07/05/2022    PSA 5.43 (H) 03/01/2022    PSA 6.02 (H) 10/08/2021    PSA 8.68 (H) 07/29/2021    PSA 11.86 (H) 07/08/2021    PSA 34.67 (H) 05/26/2021    PSA 28.16 (H) 02/03/2021    PSA 22.52 (H) 11/19/2020    PSA 23.83 (H) 08/07/2020    PSA 20.92 (H) 06/19/2020    PSA 21.36 (H) 02/07/2020    PSA 23.97 (H) 01/31/2020    PSA 22.91 (H) 11/18/2019    PSA 26.08 (H) 07/31/2019    PSA 21.76 (H) 01/22/2019    PSA 20.95 (H) 08/06/2018    PSA 20.74 (H) 05/14/2018    PSA 22.97 (H) 01/30/2018    PSA 21.71 (H) 11/02/2017        Urine dipstick shows positive for protein.         Assessment:      1. Benign prostatic hyperplasia with lower urinary tract symptoms, symptom details unspecified  Measure post void residual          Yayo Flakito Mcclure is an 88 y.o. with hx oligometastatic prostate cancer on bicalutamide. Also with obstructive LUTS;  7/21/23; 77 10/19/23; 113 today. PSA improved; continues to follow with oncology, scheduled for labs & FUV next month.     Plan:   Continue alfuzosin & finasteride daily  RTC in a year or sooner if needed  Encouraged to call in the interim with any questions, concerns

## 2024-05-20 ENCOUNTER — LAB (OUTPATIENT)
Dept: LAB | Facility: HOSPITAL | Age: 89
End: 2024-05-20
Payer: MEDICARE

## 2024-05-20 DIAGNOSIS — C61 ADENOCARCINOMA OF PROSTATE (MULTI): ICD-10-CM

## 2024-05-20 LAB
ALBUMIN SERPL BCP-MCNC: 4.2 G/DL (ref 3.4–5)
ALP SERPL-CCNC: 51 U/L (ref 33–136)
ALT SERPL W P-5'-P-CCNC: 7 U/L (ref 10–52)
ANION GAP SERPL CALC-SCNC: 13 MMOL/L (ref 10–20)
AST SERPL W P-5'-P-CCNC: 16 U/L (ref 9–39)
BASOPHILS # BLD AUTO: 0.04 X10*3/UL (ref 0–0.1)
BASOPHILS NFR BLD AUTO: 0.5 %
BILIRUB SERPL-MCNC: 1.1 MG/DL (ref 0–1.2)
BUN SERPL-MCNC: 25 MG/DL (ref 6–23)
CALCIUM SERPL-MCNC: 8.7 MG/DL (ref 8.6–10.3)
CHLORIDE SERPL-SCNC: 104 MMOL/L (ref 98–107)
CO2 SERPL-SCNC: 28 MMOL/L (ref 21–32)
CREAT SERPL-MCNC: 1.24 MG/DL (ref 0.5–1.3)
EGFRCR SERPLBLD CKD-EPI 2021: 56 ML/MIN/1.73M*2
EOSINOPHIL # BLD AUTO: 0.25 X10*3/UL (ref 0–0.4)
EOSINOPHIL NFR BLD AUTO: 2.9 %
ERYTHROCYTE [DISTWIDTH] IN BLOOD BY AUTOMATED COUNT: 13.5 % (ref 11.5–14.5)
GLUCOSE SERPL-MCNC: 94 MG/DL (ref 74–99)
HCT VFR BLD AUTO: 41.1 % (ref 41–52)
HGB BLD-MCNC: 13.6 G/DL (ref 13.5–17.5)
IMM GRANULOCYTES # BLD AUTO: 0.03 X10*3/UL (ref 0–0.5)
IMM GRANULOCYTES NFR BLD AUTO: 0.4 % (ref 0–0.9)
LYMPHOCYTES # BLD AUTO: 2.62 X10*3/UL (ref 0.8–3)
LYMPHOCYTES NFR BLD AUTO: 30.9 %
MCH RBC QN AUTO: 29.8 PG (ref 26–34)
MCHC RBC AUTO-ENTMCNC: 33.1 G/DL (ref 32–36)
MCV RBC AUTO: 90 FL (ref 80–100)
MONOCYTES # BLD AUTO: 0.79 X10*3/UL (ref 0.05–0.8)
MONOCYTES NFR BLD AUTO: 9.3 %
NEUTROPHILS # BLD AUTO: 4.76 X10*3/UL (ref 1.6–5.5)
NEUTROPHILS NFR BLD AUTO: 56 %
NRBC BLD-RTO: 0 /100 WBCS (ref 0–0)
PLATELET # BLD AUTO: 177 X10*3/UL (ref 150–450)
POTASSIUM SERPL-SCNC: 4 MMOL/L (ref 3.5–5.3)
PROT SERPL-MCNC: 7 G/DL (ref 6.4–8.2)
PSA SERPL-MCNC: 1.48 NG/ML
RBC # BLD AUTO: 4.56 X10*6/UL (ref 4.5–5.9)
SODIUM SERPL-SCNC: 141 MMOL/L (ref 136–145)
TESTOST SERPL-MCNC: 983 NG/DL (ref 240–1000)
WBC # BLD AUTO: 8.5 X10*3/UL (ref 4.4–11.3)

## 2024-05-20 PROCEDURE — 84153 ASSAY OF PSA TOTAL: CPT | Performed by: INTERNAL MEDICINE

## 2024-05-20 PROCEDURE — 84403 ASSAY OF TOTAL TESTOSTERONE: CPT | Performed by: INTERNAL MEDICINE

## 2024-05-20 PROCEDURE — 36415 COLL VENOUS BLD VENIPUNCTURE: CPT

## 2024-05-20 PROCEDURE — 85025 COMPLETE CBC W/AUTO DIFF WBC: CPT

## 2024-05-20 PROCEDURE — 84075 ASSAY ALKALINE PHOSPHATASE: CPT

## 2024-05-23 NOTE — PROGRESS NOTES
"Patient ID: Yayo Fraga Jr. is a 88 y.o. male.    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 Arroyo, declined additional therapy and remained on Casodex  alone, PSA 1/23 3.78, 5/23 3.9     Provider Team  No ref. provider found   MD Kendall Salinas  PCP - Lupis Valente  EP / Cardiology f/u   Urology Marthaad Mooreing  [facility]      Other contributing history  -Hypothyroidism, sick sinus syndrome, pacemaker, osteopenia, long qt syndrome, urinary incontinence ( finasteride, alfuzosin)        Subjective    HPI  Seen in follow up for his prostate cancer. He continues on casodex alone.   Here with daughter.   Energy \"no problem\". He was working on a car yesterday, works in his garden, and walks.   Appetite is good.   Denies cough/fever/chest pain.   Some breast tenderness since bicalutamide. Denies lumps.   Denies n/v.   Bowels regular/daily.   Denies dysuria/hematuria, but slow.   Denies pain.       Objective    BSA: 2 meters squared  /60 (BP Location: Left arm, Patient Position: Sitting, BP Cuff Size: Adult)   Pulse 59   Temp 36.1 °C (97 °F) (Temporal)   Resp 20   Wt 77.7 kg (171 lb 4.8 oz)   SpO2 96%   BMI 22.60 kg/m²      Physical Exam  Vitals reviewed.   Constitutional:       General: He is not in acute distress.  HENT:      Ears:      Comments: [place]   Eyes:      General: No " scleral icterus.  Cardiovascular:      Rate and Rhythm: Normal rate and regular rhythm.   Pulmonary:      Effort: Pulmonary effort is normal.      Breath sounds: Normal breath sounds.   Abdominal:      General: Bowel sounds are normal. There is no distension.      Palpations: Abdomen is soft.      Tenderness: There is no abdominal tenderness. There is no guarding.   Musculoskeletal:      Right lower leg: No edema.      Left lower leg: No edema.   Skin:     General: Skin is warm and dry.   Neurological:      Mental Status: He is alert and oriented to person, place, and time.   Psychiatric:         Mood and Affect: Mood normal.         Behavior: Behavior normal.         Performance Status:  Symptomatic; fully ambulatory    Lab Results   Component Value Date    PSA 1.48 05/20/2024    PSA 1.13 11/22/2023    PSA 2.73 08/28/2023     Lab Results   Component Value Date    WBC 8.5 05/20/2024    HGB 13.6 05/20/2024    HCT 41.1 05/20/2024    MCV 90 05/20/2024     05/20/2024     Lab Results   Component Value Date    GLUCOSE 94 05/20/2024    CALCIUM 8.7 05/20/2024     05/20/2024    K 4.0 05/20/2024    CO2 28 05/20/2024     05/20/2024    BUN 25 (H) 05/20/2024    CREATININE 1.24 05/20/2024     Lab Results   Component Value Date    TESTOSTERONE 983 05/20/2024     Lab Results   Component Value Date    ALT 7 (L) 05/20/2024    AST 16 05/20/2024    ALKPHOS 51 05/20/2024    BILITOT 1.1 05/20/2024        Assessment/Plan   Some rise in PSA. Patient feeling well overall. He does not wish to intensity treatment. He would like to continue on casodex and continue to monitor PSA. Patient will return in 3 mos with a PSA prior.          Diagnoses and all orders for this visit:  Adenocarcinoma of prostate (Multi)  -     Clinic Appointment Request TEJAS MUNOZ  -     Clinic Appointment Request TERA DUNLAP (or Danny); SCC 1F MEDONC1; Future  -     Prostate Specific Antigen; Future  -     Comprehensive Metabolic Panel;  Future           Brandy Delgado, APRN-CNP

## 2024-05-24 ENCOUNTER — OFFICE VISIT (OUTPATIENT)
Dept: HEMATOLOGY/ONCOLOGY | Facility: HOSPITAL | Age: 89
End: 2024-05-24
Payer: MEDICARE

## 2024-05-24 VITALS
OXYGEN SATURATION: 96 % | DIASTOLIC BLOOD PRESSURE: 60 MMHG | SYSTOLIC BLOOD PRESSURE: 110 MMHG | RESPIRATION RATE: 20 BRPM | TEMPERATURE: 97 F | WEIGHT: 171.3 LBS | BODY MASS INDEX: 22.6 KG/M2 | HEART RATE: 59 BPM

## 2024-05-24 DIAGNOSIS — C61 ADENOCARCINOMA OF PROSTATE (MULTI): ICD-10-CM

## 2024-05-24 PROCEDURE — 99213 OFFICE O/P EST LOW 20 MIN: CPT | Performed by: NURSE PRACTITIONER

## 2024-05-24 PROCEDURE — 1036F TOBACCO NON-USER: CPT | Performed by: NURSE PRACTITIONER

## 2024-05-24 PROCEDURE — 1126F AMNT PAIN NOTED NONE PRSNT: CPT | Performed by: NURSE PRACTITIONER

## 2024-05-24 PROCEDURE — 1159F MED LIST DOCD IN RCRD: CPT | Performed by: NURSE PRACTITIONER

## 2024-05-24 PROCEDURE — 1157F ADVNC CARE PLAN IN RCRD: CPT | Performed by: NURSE PRACTITIONER

## 2024-05-24 ASSESSMENT — PAIN SCALES - GENERAL: PAINLEVEL: 0-NO PAIN

## 2024-06-05 ENCOUNTER — OFFICE VISIT (OUTPATIENT)
Dept: GERIATRIC MEDICINE | Facility: CLINIC | Age: 89
End: 2024-06-05
Payer: MEDICARE

## 2024-06-05 VITALS
WEIGHT: 170.9 LBS | HEART RATE: 71 BPM | DIASTOLIC BLOOD PRESSURE: 62 MMHG | OXYGEN SATURATION: 98 % | BODY MASS INDEX: 22.55 KG/M2 | SYSTOLIC BLOOD PRESSURE: 106 MMHG

## 2024-06-05 DIAGNOSIS — M54.12 CERVICAL RADICULOPATHY: Primary | ICD-10-CM

## 2024-06-05 DIAGNOSIS — R25.1 TREMOR OF BOTH HANDS: ICD-10-CM

## 2024-06-05 DIAGNOSIS — G25.0 ESSENTIAL TREMOR: ICD-10-CM

## 2024-06-05 DIAGNOSIS — E03.8 SUBCLINICAL HYPOTHYROIDISM: ICD-10-CM

## 2024-06-05 DIAGNOSIS — C61 ADENOCARCINOMA OF PROSTATE (MULTI): ICD-10-CM

## 2024-06-05 DIAGNOSIS — I45.81 LONG QT SYNDROME: ICD-10-CM

## 2024-06-05 PROCEDURE — 1160F RVW MEDS BY RX/DR IN RCRD: CPT | Performed by: NURSE PRACTITIONER

## 2024-06-05 PROCEDURE — 1157F ADVNC CARE PLAN IN RCRD: CPT | Performed by: NURSE PRACTITIONER

## 2024-06-05 PROCEDURE — 1159F MED LIST DOCD IN RCRD: CPT | Performed by: NURSE PRACTITIONER

## 2024-06-05 PROCEDURE — 99215 OFFICE O/P EST HI 40 MIN: CPT | Performed by: NURSE PRACTITIONER

## 2024-06-05 PROCEDURE — 1036F TOBACCO NON-USER: CPT | Performed by: NURSE PRACTITIONER

## 2024-06-05 RX ORDER — BACLOFEN 20 MG
1 TABLET ORAL EVERY OTHER DAY
COMMUNITY

## 2024-06-05 ASSESSMENT — ENCOUNTER SYMPTOMS
LOSS OF SENSATION IN FEET: 0
DIFFICULTY URINATING: 1
DEPRESSION: 0
DIZZINESS: 1
APPETITE CHANGE: 0
SHORTNESS OF BREATH: 0
DIARRHEA: 0
ACTIVITY CHANGE: 0
CONSTIPATION: 0
MUSCULOSKELETAL NEGATIVE: 1
OCCASIONAL FEELINGS OF UNSTEADINESS: 0
SLEEP DISTURBANCE: 0
UNEXPECTED WEIGHT CHANGE: 0

## 2024-06-05 ASSESSMENT — PAIN SCALES - GENERAL: PAINLEVEL_OUTOF10: 0 - NO PAIN

## 2024-06-05 ASSESSMENT — PATIENT HEALTH QUESTIONNAIRE - PHQ9
SUM OF ALL RESPONSES TO PHQ9 QUESTIONS 1 AND 2: 0
1. LITTLE INTEREST OR PLEASURE IN DOING THINGS: NOT AT ALL
2. FEELING DOWN, DEPRESSED OR HOPELESS: NOT AT ALL

## 2024-06-05 ASSESSMENT — PAIN - FUNCTIONAL ASSESSMENT: PAIN_FUNCTIONAL_ASSESSMENT: 0-10

## 2024-06-05 NOTE — PROGRESS NOTES
"Subjective  0  Mr. Fraga is 88 y.o. year old male and here for f/u of No chief complaint on file.   Fu tremors, chronic pain    Here with self.    Last visit [id number] Min   Per pt discussion/summary: note reviewed: B hand tremor R>L; [place] with aides, chronic pain, bph follows with uro     Interim events:    HPI   Per patient   - co hemorrhoid, using witch hazel prn   - had fu with uro and onco, continue on meds, casodex cause nipple soreness at times.     Review of Systems   Constitutional:  Negative for activity change, appetite change and unexpected weight change.   HENT:  Positive for hearing loss (worsening). Negative for ear pain (no excess cerumen).    Eyes:  Positive for visual disturbance.   Respiratory:  Negative for shortness of breath.    Cardiovascular:  Negative for chest pain (has occ nipple pain from prostate meds) and leg swelling.   Gastrointestinal:  Negative for constipation and diarrhea.        Hemorrhoid, uses some witch hazel, itches at times, no bleeding    Genitourinary:  Positive for difficulty urinating (hesitancy).   Musculoskeletal: Negative.    Skin:         Occ dry skin flaking   Neurological:  Positive for dizziness (\"periodically getting up\").   Psychiatric/Behavioral:  Negative for sleep disturbance.        Objective   There were no vitals taken for this visit.  MoCA:  /30  Lab on 05/20/2024   Component Date Value Ref Range Status    Prostate Specific AG 05/20/2024 1.48  <=4.00 ng/mL Final    Glucose 05/20/2024 94  74 - 99 mg/dL Final    Sodium 05/20/2024 141  136 - 145 mmol/L Final    Potassium 05/20/2024 4.0  3.5 - 5.3 mmol/L Final    Chloride 05/20/2024 104  98 - 107 mmol/L Final    Bicarbonate 05/20/2024 28  21 - 32 mmol/L Final    Anion Gap 05/20/2024 13  10 - 20 mmol/L Final    Urea Nitrogen 05/20/2024 25 (H)  6 - 23 mg/dL Final    Creatinine 05/20/2024 1.24  0.50 - 1.30 mg/dL Final    eGFR 05/20/2024 56 (L)  >60 mL/min/1.73m*2 Final    Calcium 05/20/2024 8.7  8.6 - 10.3 mg/dL " Final    Albumin 05/20/2024 4.2  3.4 - 5.0 g/dL Final    Alkaline Phosphatase 05/20/2024 51  33 - 136 U/L Final    Total Protein 05/20/2024 7.0  6.4 - 8.2 g/dL Final    AST 05/20/2024 16  9 - 39 U/L Final    Bilirubin, Total 05/20/2024 1.1  0.0 - 1.2 mg/dL Final    ALT 05/20/2024 7 (L)  10 - 52 U/L Final    Testosterone 05/20/2024 983  240 - 1,000 ng/dL Final    WBC 05/20/2024 8.5  4.4 - 11.3 x10*3/uL Final    nRBC 05/20/2024 0.0  0.0 - 0.0 /100 WBCs Final    RBC 05/20/2024 4.56  4.50 - 5.90 x10*6/uL Final    Hemoglobin 05/20/2024 13.6  13.5 - 17.5 g/dL Final    Hematocrit 05/20/2024 41.1  41.0 - 52.0 % Final    MCV 05/20/2024 90  80 - 100 fL Final    MCH 05/20/2024 29.8  26.0 - 34.0 pg Final    MCHC 05/20/2024 33.1  32.0 - 36.0 g/dL Final    RDW 05/20/2024 13.5  11.5 - 14.5 % Final    Platelets 05/20/2024 177  150 - 450 x10*3/uL Final    Neutrophils % 05/20/2024 56.0  40.0 - 80.0 % Final    Immature Granulocytes %, Automated 05/20/2024 0.4  0.0 - 0.9 % Final    Lymphocytes % 05/20/2024 30.9  13.0 - 44.0 % Final    Monocytes % 05/20/2024 9.3  2.0 - 10.0 % Final    Eosinophils % 05/20/2024 2.9  0.0 - 6.0 % Final    Basophils % 05/20/2024 0.5  0.0 - 2.0 % Final    Neutrophils Absolute 05/20/2024 4.76  1.60 - 5.50 x10*3/uL Final    Immature Granulocytes Absolute, Au* 05/20/2024 0.03  0.00 - 0.50 x10*3/uL Final    Lymphocytes Absolute 05/20/2024 2.62  0.80 - 3.00 x10*3/uL Final    Monocytes Absolute 05/20/2024 0.79  0.05 - 0.80 x10*3/uL Final    Eosinophils Absolute 05/20/2024 0.25  0.00 - 0.40 x10*3/uL Final    Basophils Absolute 05/20/2024 0.04  0.00 - 0.10 x10*3/uL Final   Office Visit on 04/11/2024   Component Date Value Ref Range Status    POC Color, Urine 04/11/2024 Yellow  Straw, Yellow, Light-Yellow Final    POC Appearance, Urine 04/11/2024 Clear  Clear Final    POC Glucose, Urine 04/11/2024 NEGATIVE  NEGATIVE mg/dl Final    POC Bilirubin, Urine 04/11/2024 NEGATIVE  NEGATIVE Final    POC Ketones, Urine  04/11/2024 NEGATIVE  NEGATIVE mg/dl Final    POC Specific Gravity, Urine 04/11/2024 1.020  1.005 - 1.035 Final    POC Blood, Urine 04/11/2024 NEGATIVE  NEGATIVE Final    POC PH, Urine 04/11/2024 7.0  No Reference Range Established PH Final    POC Protein, Urine 04/11/2024 TRACE (A)  NEGATIVE, 30 (1+) mg/dl Final    POC Urobilinogen, Urine 04/11/2024 0.2  0.2, 1.0 EU/DL Final    Poc Nitrite, Urine 04/11/2024 NEGATIVE  NEGATIVE Final    POC Leukocytes, Urine 04/11/2024 NEGATIVE  NEGATIVE Final         Physical Exam  Vitals reviewed.   Constitutional:       Appearance: Normal appearance.      Comments: NAD   HENT:      Head: Normocephalic and atraumatic.      Comments: Hearing adequate for conversation but need to speak up loudly      Ears:      Comments: Aides in place, [place] still     Nose: Nose normal.      Mouth/Throat:      Mouth: Mucous membranes are moist.      Pharynx: Oropharynx is clear.   Eyes:      Conjunctiva/sclera: Conjunctivae normal.   Cardiovascular:      Rate and Rhythm: Normal rate and regular rhythm.      Pulses: Normal pulses.      Heart sounds: Normal heart sounds.   Pulmonary:      Effort: Pulmonary effort is normal.      Breath sounds: Normal breath sounds.   Abdominal:      General: Abdomen is flat. Bowel sounds are normal.      Palpations: Abdomen is soft.   Genitourinary:     Rectum: External hemorrhoid present.       Musculoskeletal:         General: Normal range of motion.   Skin:     General: Skin is warm and dry.      Capillary Refill: Capillary refill takes less than 2 seconds.   Neurological:      General: No focal deficit present.      Mental Status: He is alert and oriented to person, place, and time.   Psychiatric:         Mood and Affect: Mood normal.           Assessment/Plan     1. Cervical radiculopathy  - medical massage helps     2. Subclinical hypothyroidism  - last labs 999450, no indication for tx at this time (TSH may be age appropriate)     3. Adenocarcinoma of prostate  (Multi)/BPH  - continue meds; tried stopping alfuzosin but had more straining   - follows with uro; had some rise in psa but no plan to intensify tx, ru in 3 mos from May     4. Tremor of both hands  5. Essential tremor  - no worsening L >R today      6. Hearing loss  - has bilat aides but says its getting worse   - sees audio for aide maint.     7. HLD  - on statin     8. Long QT syndrome  - pacer    9. Hemorrhoid  - small non bleeding  - witch hazel or hydrocort oint prn   - pt info sheet provided.        - AWV [id number]  - may need PCV20, discuss next visit  - consider moca in future     Dispo: fu 6mos, call if issues prior. Check labs tsh/t4, bmp, cbc next visit

## 2024-06-05 NOTE — PATIENT INSTRUCTIONS
Thank you for meeting with me today. We discussed the following:     Can use some 1% hydrocortisone cream on the hemorrhoid if the witch hazel is ineffective. Its over the counter, don't use for more than 7 days in a row to avoid thinning of tissues. If they bleed, let us know, it might be helpful to see gastroenterology.     Casodex is causing the nipple soreness because of the effect on hormones.     In addition, here are some general guidelines on brain health, pain control and sleep.   General brain health guidelines:  - Make sure your medical conditions are well controlled (e.g., high blood pressure, high cholesterol, diabetes, sleep apnea etc)  - Do not smoke or chew tobacco  - Limiit alcohol use to no more than 1 alcoholic beverage per day   - Address any sensory deficits (e.g., proper glasses for poor eyesight, hearing aids for hearing loss)  - Use a weekly pill box  - Eat a heart healthy diet (fruits, vegetables, lean meats, fatty fish, whole grains. Limit processed foods)  - Exercise or walk. Gradually increase to the goal of 5 days per week, 30 minutes at a time  - Try to get at least 7 hours of quality sleep per night  - Keep yourself mentally active daily by reading, playing cards, doing word searches, puzzles, etc.  - Challenge your brain with new cognitive tasks (new hobby, crafts, take a class, learn a language)  - Stay socially active by being part of a group or organization    General pain control guidelines  - try to stay ahead of pain by taking your medications sooner rather than later  - Tyelnol is generally a safe medication to take for pain. A general dose is 1000 mg every 6-8 hours; do not exceed 3000 mg or 3 doses in a day. Stay away from formulations that have Benadryl or diphenhydramine in them.   - Lidocaine gel or patch may help to relieve pain. 4% strength is over the counter.  - Voltaren gel 1% may be helpful in relieving pain. It is available over the counter.   - avoid Nonsteroidal  Anti-inflammatories (Nsaids) such as Motrin (ibuprofen), Aleve (naproxen) unless specifically recommended by your provider;  these can cause gastrointestinal and kidney problems.   - Use heat or cold as needed to help with pain.   - Distraction can be helpful.     General sleep guidelines  - Avoid over the counter sleep preparations with diphenhydramine or Benadryl. This medicine can cause confusion and increase risk of falls.   - Do not use alcohol to go to sleep.   - Melatonin is generally safe to try, start with 1-3 mg a couple of hours before sleep.   - Avoid all caffeine after 12 noon. Look for hidden sources such as chocolate.   - Try an herbal tea like chamomile or Sleepytime before bed.  - Turn off the TV or set a timer so it goes off.   - Establish a bedtime routine to tell your body it is time to sleep. It can be some relaxing music, reading a book, taking a shower, prayer/meditation, etc.       Please follow up with us in return to clinic: 6 months.  Please bring all medications to follow up appointments.  If the weather is bad on the day of your next appointment, it can be changed to a virtual visit. Please call the office on the day of the visit and ask them to change it to a virtual visit. We will do labwork at the next appt.

## 2024-06-06 ENCOUNTER — APPOINTMENT (OUTPATIENT)
Dept: GERIATRIC MEDICINE | Facility: CLINIC | Age: 89
End: 2024-06-06
Payer: MEDICARE

## 2024-06-12 DIAGNOSIS — E78.2 MIXED HYPERLIPIDEMIA: ICD-10-CM

## 2024-06-12 RX ORDER — SIMVASTATIN 40 MG/1
TABLET, FILM COATED ORAL
Qty: 90 TABLET | Refills: 3 | Status: SHIPPED | OUTPATIENT
Start: 2024-06-12

## 2024-08-19 ENCOUNTER — LAB (OUTPATIENT)
Dept: LAB | Facility: HOSPITAL | Age: 89
End: 2024-08-19
Payer: MEDICARE

## 2024-08-19 DIAGNOSIS — C61 ADENOCARCINOMA OF PROSTATE (MULTI): ICD-10-CM

## 2024-08-19 LAB
ALBUMIN SERPL BCP-MCNC: 4 G/DL (ref 3.4–5)
ALP SERPL-CCNC: 55 U/L (ref 33–136)
ALT SERPL W P-5'-P-CCNC: 7 U/L (ref 10–52)
ANION GAP SERPL CALC-SCNC: 15 MMOL/L (ref 10–20)
AST SERPL W P-5'-P-CCNC: 15 U/L (ref 9–39)
BILIRUB SERPL-MCNC: 1.1 MG/DL (ref 0–1.2)
BUN SERPL-MCNC: 23 MG/DL (ref 6–23)
CALCIUM SERPL-MCNC: 8.9 MG/DL (ref 8.6–10.3)
CHLORIDE SERPL-SCNC: 104 MMOL/L (ref 98–107)
CO2 SERPL-SCNC: 26 MMOL/L (ref 21–32)
CREAT SERPL-MCNC: 1.15 MG/DL (ref 0.5–1.3)
EGFRCR SERPLBLD CKD-EPI 2021: 61 ML/MIN/1.73M*2
GLUCOSE SERPL-MCNC: 100 MG/DL (ref 74–99)
HOLD SPECIMEN: NORMAL
POTASSIUM SERPL-SCNC: 3.9 MMOL/L (ref 3.5–5.3)
PROT SERPL-MCNC: 7 G/DL (ref 6.4–8.2)
PSA SERPL-MCNC: 1.77 NG/ML
SODIUM SERPL-SCNC: 141 MMOL/L (ref 136–145)

## 2024-08-19 PROCEDURE — 80053 COMPREHEN METABOLIC PANEL: CPT

## 2024-08-19 PROCEDURE — 36415 COLL VENOUS BLD VENIPUNCTURE: CPT

## 2024-08-19 PROCEDURE — 84153 ASSAY OF PSA TOTAL: CPT | Performed by: NURSE PRACTITIONER

## 2024-08-22 NOTE — PROGRESS NOTES
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 Arroyo, declined additional therapy and remained on Casodex  alone, PSA response, rafael 1.13 11/23     Provider Team  Brandy Delgado, APRN-CNP   MD Kendall Salinas  PCP - Lupis Valente  EP / Cardiology f/u   [facility] [place]  [facility]     Other contributing history  -Hypothyroidism, sick sinus syndrome, pacemaker, osteopenia, long qt syndrome, urinary incontinence ( finasteride, alfuzosin)       Interval history:  The patient presents for follow up.  He is overall doing well without any other major new symptoms.  Denies any ongoing issues with nausea, vomiting, fevers, chills denies any worsening issues with his urinary symptoms.  Those are overall stable denies issues with the Casodex.  Denies any other major new symptoms.    ROS:  10-pt ROS reviewed and negative except as mentioned above.    Medications: below was reviewed and is accurate  Current Outpatient Medications   Medication Instructions    alfuzosin (UROXATRAL) 10 mg, oral, Daily    bicalutamide (CASODEX) 50 mg, oral, Daily    cholecalciferol (Vitamin D-3) 50 mcg (2,000 unit) capsule 1 capsule, oral, Daily    finasteride (PROSCAR) 5 mg, oral, Daily    fish oil concentrate (Omega-3) 120-180 mg capsule Take 1 capsule (1 g) by mouth every other day.     LYCOPENE ORAL Lycopene Oral Capsule  Refills: 0 [id number], APRN-CNS Leigh Start: 2-Dec-2022    magnesium oxide 500 mg magnesium tablet 1 tablet, oral, Every other day    simvastatin (Zocor) 40 mg tablet TAKE 1 TABLET BY MOUTH EVERY DAY    ZINC ACETATE ORAL Mouth/Throat        Detailed family history and social history reviewed in detail and updated per epic charting and in detail with the patient    Physical exam:  Vitals:    08/23/24 0910   BP: 112/70   BP Location: Left arm   Patient Position: Sitting   BP Cuff Size: Adult   Pulse: 77   Resp: 20   Temp: 36 °C (96.8 °F)   TempSrc: Temporal   SpO2: 94%   Weight: 77.4 kg (170 lb 10.2 oz)     GEN: NAD, well-appearing hard of hearing does have hearing aids in place  LYMPH: no palpable adenopathy  SKIN: no concerning new lesions some senile purpura appreciated  LUNGS: CTA  CV: RRR no clear R/G  ABD: Soft, NTND. No rebound or guarding.  EXT:  trace edema bilaterally   NEURO: Grossly intact. No focal deficits.  PSYCH: Normal mood and affect    Radiology review  Reviewed in detail personally and for detail see results in EPIC        Genomics Data    Laboratory review  Reviewed in detail personally and for detail see results in EPIC  .  Lab Results   Component Value Date    WBC 8.5 05/20/2024    HGB 13.6 05/20/2024    HCT 41.1 05/20/2024    MCV 90 05/20/2024     05/20/2024     Lab Results   Component Value Date    GLUCOSE 100 (H) 08/19/2024    CALCIUM 8.9 08/19/2024     08/19/2024    K 3.9 08/19/2024    CO2 26 08/19/2024     08/19/2024    BUN 23 08/19/2024    CREATININE 1.15 08/19/2024     Lab Results   Component Value Date    PSA 1.77 08/19/2024    PSA 1.48 05/20/2024    PSA 1.13 11/22/2023     Lab Results   Component Value Date    ALT 7 (L) 08/19/2024    AST 15 08/19/2024    ALKPHOS 55 08/19/2024    BILITOT 1.1 08/19/2024     Lab Results   Component Value Date    TESTOSTERONE 983 05/20/2024         ASSESSMENT AND PLAN     Prostate Cancer -slow  rising PSA doubling time but still not quite a year risk benefits discussed in detail options discussed in detail he remains on single agent Casodex discussed the role of ADT further intervention including imaging risk benefits and alternatives and he is really not interested in further intervention he wants to continue on the Casodex will arrange follow-up in 3 months with labs prior he will contact us for any other new symptoms.  Aware of the risk of progression.  CT findings - role of repeat imaging discussed in regards to nonspecific pulmonary and pancreas findings and refused intervention.  Prefers to monitor aware of the risk of progression no worsening symptoms  LUTS again continue follow-up with urology discussed in detail the finasteride could actually be lowering the PSA and his actual PSA could be higher wants to continue on the medications for now      Burt Arroyo MD  Senior Attending Physician/  in Medicine [facility] [employer]  [facility]  Patient line [phone]  Fax [phone]

## 2024-08-23 ENCOUNTER — OFFICE VISIT (OUTPATIENT)
Dept: HEMATOLOGY/ONCOLOGY | Facility: HOSPITAL | Age: 89
End: 2024-08-23
Payer: MEDICARE

## 2024-08-23 VITALS
TEMPERATURE: 96.8 F | OXYGEN SATURATION: 94 % | RESPIRATION RATE: 20 BRPM | BODY MASS INDEX: 22.51 KG/M2 | DIASTOLIC BLOOD PRESSURE: 70 MMHG | SYSTOLIC BLOOD PRESSURE: 112 MMHG | WEIGHT: 170.64 LBS | HEART RATE: 77 BPM

## 2024-08-23 DIAGNOSIS — C61 ADENOCARCINOMA OF PROSTATE (MULTI): ICD-10-CM

## 2024-08-23 PROCEDURE — 1126F AMNT PAIN NOTED NONE PRSNT: CPT | Performed by: INTERNAL MEDICINE

## 2024-08-23 PROCEDURE — 1159F MED LIST DOCD IN RCRD: CPT | Performed by: INTERNAL MEDICINE

## 2024-08-23 PROCEDURE — 1157F ADVNC CARE PLAN IN RCRD: CPT | Performed by: INTERNAL MEDICINE

## 2024-08-23 PROCEDURE — 99214 OFFICE O/P EST MOD 30 MIN: CPT | Performed by: INTERNAL MEDICINE

## 2024-08-23 ASSESSMENT — PAIN SCALES - GENERAL: PAINLEVEL: 0-NO PAIN

## 2024-08-23 NOTE — PATIENT INSTRUCTIONS
Dr Jamison would like to follow up with you in 3 months with an in-person clinic visit. You will need labs drawn prior to thisvisit.    Please call [phone] option 5, option 2 for any questions or concerns. Please call [phone] option 1, for any scheduling concerns or issues.

## 2024-09-19 NOTE — PROGRESS NOTES
[facility]  Outpatient Clinic Note    Patient Name:  Yayo Fraga Jr.  MRN:  [id number]  :  1935  Date of Service: 2024     Visit type: Follow up visit    HPI    Interval History:  Yayo Fraga Jr. is a 89 y.o. male who is being seen today for  problems listed below.     Problem list/Chief complaints:  BPH with obstructive LUTS - Taking alfuzosin and finasteride daily  Oligometastatic prostate cancer - on bicaltamide    24: Follow up visit with Martha Barnes CNP. Returns for FUV. Seen accompanied by daughter, Leatha. Tried going off alfuzosin for a week, noted increased straining. Stream is slow even on medication. No leakage, hematuria. NTF ~1.  23; 77 10/19/23; 113 today. PSA improved; continues to follow with oncology, scheduled for labs & FUV next month.     24: Patient presents for PVR. He is accompanied by his wife. They are going on vacation soon and he wanted to make sure he was not retaining too much urine. He feels like he's emptying his bladder and has no discomfort. He states he has to sit for a while to complete urinating. He has nocturia x2. He is not bothered enough with his urinary symptoms to change his treatment or consider procedure.    Past Medical History:   Diagnosis Date    Personal history of other diseases of the digestive system 2018    History of diverticulosis    Sick sinus syndrome (Multi) 2018    Sick sinus syndrome       Past Surgical History:   Procedure Laterality Date    CARDIAC PACEMAKER PLACEMENT  2015    Pacemaker Placement    COLONOSCOPY  2015    Complete Colonoscopy    HERNIA REPAIR  2015    Hernia Repair    OTHER SURGICAL HISTORY  2018    Needle Biopsy Of Prostate       Social History     Socioeconomic History    Marital status:      Spouse name: Not on file    Number of children: Not on file    Years of education: Not on file    Highest education level: Not on file   Occupational  "History    Not on file   Tobacco Use    Smoking status: Never    Smokeless tobacco: Never   Substance and Sexual Activity    Alcohol use: Not on file    Drug use: Not on file    Sexual activity: Not on file   Other Topics Concern    Not on file   Social History Narrative    Not on file     Social Determinants of Health     Financial Resource Strain: Not on file   Food Insecurity: Not on file   Transportation Needs: Not on file   Physical Activity: Not on file   Stress: Not on file   Social Connections: Not on file   Intimate Partner Violence: Not on file   Housing Stability: Not on file       No Known Allergies       Current Outpatient Medications:     alfuzosin (Uroxatral) 10 mg 24 hr tablet, Take 1 tablet (10 mg) by mouth once daily., Disp: 90 tablet, Rfl: 3    bicalutamide (Casodex) 50 mg tablet, Take 1 tablet (50 mg total) by mouth once daily., Disp: 30 tablet, Rfl: 11    cholecalciferol (Vitamin D-3) 50 mcg (2,000 unit) capsule, Take 1 capsule (50 mcg) by mouth once daily., Disp: , Rfl:     finasteride (Proscar) 5 mg tablet, Take 1 tablet (5 mg) by mouth once daily., Disp: 90 tablet, Rfl: 3    fish oil concentrate (Omega-3) 120-180 mg capsule, Take 1 capsule (1 g) by mouth every other day., Disp: , Rfl:     LYCOPENE ORAL, Lycopene Oral Capsule Refills: 0 NICKOLAS Zhong Start: 2-Dec-2022, Disp: , Rfl:     magnesium oxide 500 mg magnesium tablet, Take 1 tablet (500 mg) by mouth every other day., Disp: , Rfl:     simvastatin (Zocor) 40 mg tablet, TAKE 1 TABLET BY MOUTH EVERY DAY, Disp: 90 tablet, Rfl: 3    ZINC ACETATE ORAL, Use in the mouth or throat., Disp: , Rfl:      Review of system:  All other systems have been reviewed and are negative for complaints      Last recorded vitals:  Height 1.854 m (6' 1\"), weight 77.1 kg (170 lb).    Physical Exam:  General: Appears comfortable and in no apparent distress.  Head: Normocephalic, atraumatic  Eyes: Non-injected conjunctiva, sclera clear, no " proptosis  Lungs: Breathing is easy, non-labored. Speaking in clear and complete sentences. Normal diaphragmatic movement.  Cardiovascular: no peripheral edema, cyanosis or pallor.   Abdomen: soft, non-distended, non-tender  : Bladder: non tender, not distended  MSK: Ambulatory with steady gait, unassisted  Skin: No visible rashes or lesions  Neurologic: Alert, oriented to person, place, and time  Psychiatric: mood and affect appropriate        Labs  Lab on 08/19/2024   Component Date Value    Prostate Specific AG 08/19/2024 1.77     Glucose 08/19/2024 100 (H)     Sodium 08/19/2024 141     Potassium 08/19/2024 3.9     Chloride 08/19/2024 104     Bicarbonate 08/19/2024 26     Anion Gap 08/19/2024 15     Urea Nitrogen 08/19/2024 23     Creatinine 08/19/2024 1.15     eGFR 08/19/2024 61     Calcium 08/19/2024 8.9     Albumin 08/19/2024 4.0     Alkaline Phosphatase 08/19/2024 55     Total Protein 08/19/2024 7.0     AST 08/19/2024 15     Bilirubin, Total 08/19/2024 1.1     ALT 08/19/2024 7 (L)     Extra Tube 08/19/2024 Hold for add-ons.        Assessment and Plan:  Yayo Fraga Jr. is a 89 y.o. male with history of oligometastatic prostate cancer on bicalutamide, BPH with obstructive LUTS, who presents for follow up and PVR.     1.Today we discussed BPH. BPH is the benign growth of prostate tissue that may cause bothersome urinary symptoms. The mechanism of action as well as the risks, benefits, common side effects, and alternatives to all prescribed medications were discussed with the patient at length. The patient had the opportunity to ask questions and all questions were answered. I primarily discussed alpha blockers, 5ARIs, and PDE5i.  I explained that 5 alpha reductase inhibitors can shrink the prostate up to 30%, can artificially decrease their PSA value by 50%, but take approximately 6-9 months to reach full efficacy and have potential side effects to include decreased libido, impotence and breast tenderness.  Additionally, if you continue to experience bothersome symptoms despite medication, there are minimally invasive procedures to alleviate these symptoms.    Plan:  - ml  -Discussed the risks and benefit of continuing finasteride and alfuzosin, medications are working well for patient with no side effects. Discussed other management options. The patient and I agreed to continue with medications.  -Discussed possible CIC if patient is worried about urinary retention during vacation, he declined at this time  -Follow-up in 6 weeks, or sooner if needed, to reassess symptoms and for medication refill.    All questions and concerns were addressed. Patient verbalizes understanding and has no other questions at this time.     Some elements copied from Martha Barnes's, CNP note on 4/11/24, the elements have been updated and all reflect current decision making from today, 09/20/24    E&M visit today is associated with current or anticipated ongoing medical care services related to a patient's single, serious condition or a complex condition.    CORNEL Hernandez-CNP   [facility] [place]  09/20/24 2:34 PM

## 2024-09-20 ENCOUNTER — OFFICE VISIT (OUTPATIENT)
Dept: UROLOGY | Facility: HOSPITAL | Age: 89
End: 2024-09-20
Payer: MEDICARE

## 2024-09-20 VITALS — WEIGHT: 170 LBS | HEIGHT: 73 IN | BODY MASS INDEX: 22.53 KG/M2

## 2024-09-20 DIAGNOSIS — N13.8 BPH WITH OBSTRUCTION/LOWER URINARY TRACT SYMPTOMS: Primary | ICD-10-CM

## 2024-09-20 DIAGNOSIS — N40.1 BPH WITH OBSTRUCTION/LOWER URINARY TRACT SYMPTOMS: Primary | ICD-10-CM

## 2024-09-20 PROCEDURE — 1157F ADVNC CARE PLAN IN RCRD: CPT | Performed by: NURSE PRACTITIONER

## 2024-09-20 PROCEDURE — 1036F TOBACCO NON-USER: CPT | Performed by: NURSE PRACTITIONER

## 2024-09-20 PROCEDURE — 1126F AMNT PAIN NOTED NONE PRSNT: CPT | Performed by: NURSE PRACTITIONER

## 2024-09-20 PROCEDURE — 51798 US URINE CAPACITY MEASURE: CPT | Performed by: NURSE PRACTITIONER

## 2024-09-20 PROCEDURE — 99214 OFFICE O/P EST MOD 30 MIN: CPT | Performed by: NURSE PRACTITIONER

## 2024-09-20 PROCEDURE — 1160F RVW MEDS BY RX/DR IN RCRD: CPT | Performed by: NURSE PRACTITIONER

## 2024-09-20 PROCEDURE — G2211 COMPLEX E/M VISIT ADD ON: HCPCS | Performed by: NURSE PRACTITIONER

## 2024-09-20 PROCEDURE — 1159F MED LIST DOCD IN RCRD: CPT | Performed by: NURSE PRACTITIONER

## 2024-09-20 ASSESSMENT — PAIN SCALES - GENERAL: PAINLEVEL: 0-NO PAIN

## 2024-09-23 ENCOUNTER — DOCUMENTATION (OUTPATIENT)
Dept: GERIATRIC MEDICINE | Facility: CLINIC | Age: 89
End: 2024-09-23
Payer: MEDICARE

## 2024-09-23 ENCOUNTER — HOSPITAL ENCOUNTER (OUTPATIENT)
Dept: RADIOLOGY | Facility: CLINIC | Age: 89
Discharge: HOME | End: 2024-09-23
Payer: MEDICARE

## 2024-09-23 ENCOUNTER — OFFICE VISIT (OUTPATIENT)
Dept: URGENT CARE | Age: 89
End: 2024-09-23
Payer: MEDICARE

## 2024-09-23 VITALS
WEIGHT: 170 LBS | SYSTOLIC BLOOD PRESSURE: 151 MMHG | DIASTOLIC BLOOD PRESSURE: 85 MMHG | BODY MASS INDEX: 22.43 KG/M2 | RESPIRATION RATE: 16 BRPM | TEMPERATURE: 97.7 F | HEART RATE: 60 BPM | OXYGEN SATURATION: 93 %

## 2024-09-23 DIAGNOSIS — R10.30 LOWER ABDOMINAL PAIN: ICD-10-CM

## 2024-09-23 LAB
POC APPEARANCE, URINE: CLEAR
POC BILIRUBIN, URINE: NEGATIVE
POC BLOOD, URINE: NEGATIVE
POC COLOR, URINE: YELLOW
POC GLUCOSE, URINE: NEGATIVE MG/DL
POC KETONES, URINE: NEGATIVE MG/DL
POC LEUKOCYTES, URINE: NEGATIVE
POC NITRITE,URINE: NEGATIVE
POC PH, URINE: 6 PH
POC PROTEIN, URINE: NEGATIVE MG/DL
POC SPECIFIC GRAVITY, URINE: 1.01
POC UROBILINOGEN, URINE: 0.2 EU/DL

## 2024-09-23 PROCEDURE — 74019 RADEX ABDOMEN 2 VIEWS: CPT | Performed by: RADIOLOGY

## 2024-09-23 PROCEDURE — 74019 RADEX ABDOMEN 2 VIEWS: CPT

## 2024-09-23 PROCEDURE — 87086 URINE CULTURE/COLONY COUNT: CPT

## 2024-09-23 RX ORDER — HYOSCYAMINE SULFATE 0.12 MG/1
0.12 TABLET, ORALLY DISINTEGRATING ORAL 4 TIMES DAILY PRN
Qty: 10 EACH | Refills: 0 | Status: SHIPPED | OUTPATIENT
Start: 2024-09-23 | End: 2024-09-26

## 2024-09-23 ASSESSMENT — PAIN SCALES - GENERAL: PAINLEVEL: 2

## 2024-09-23 NOTE — PATIENT INSTRUCTIONS
89-year-old with history of urinary retention, CA prostate, remote inguinal hernia repair with abdominal pain, belching, urine negative, urine culture results pending.  KUB abdominal results pending.  Advised to continue with clear and soft diet more frequent meals.  If new or worsening symptoms advised to go to the ER.

## 2024-09-23 NOTE — PROGRESS NOTES
"Spoke with the secretaries at the , stated the NP does not have any openings today. This nurse then called wife and instructed her to take pt to the urgent care, stated \"okay thank you\".   "

## 2024-09-23 NOTE — PROGRESS NOTES
"Wife called stating that pt has not been feeling well since last Tuesday. Stated he \"felt hot\", but could not say whether he had a fever or not. Stated since pt has had a poor appetite, denied vomiting, but stated pt is having BM's and is drinking Gatorade. Made wife aware that she will be transferred to the  to see if the NP has any openings, and if not, pt will have to go to the urgent care, stated \"okay\".   "

## 2024-09-23 NOTE — PROGRESS NOTES
Subjective   Patient ID: Yayo Fraga Jr. is a 89 y.o. male. They present today with a chief complaint of Abdominal Pain (1 week upset stomach lower - below umbilical area  hot flashes/nausea loss of appetite- cramping/ twisted towel feeling BM- normal but smaller denies diarrhea saw urology for bladder a few days ago  ).    History of Present Illness  88 yo with his daughter with for aching constant cramping lower abdomen pain past 1 week, poor appetite, fatigue, hot flashes. No diarrhea, no blood in stools, increased belching, denies constipation. No vomiting. Spouse without similar sx. Remote hx of inguinal hernia repair, diverticulosis. Hx of prostate Ca, seen urology last Friday neg post void, urinalysis not done. No dysuria or hematuria. No flank pain. Denies constipation. Positive flatus.       History provided by:  Patient and relative   used: No        Past Medical History  Allergies as of 09/23/2024    (No Known Allergies)       (Not in a hospital admission)       Past Medical History:   Diagnosis Date    Personal history of other diseases of the digestive system 01/02/2018    History of diverticulosis    Sick sinus syndrome (Multi) 01/02/2018    Sick sinus syndrome       Past Surgical History:   Procedure Laterality Date    CARDIAC PACEMAKER PLACEMENT  02/11/2015    Pacemaker Placement    COLONOSCOPY  02/11/2015    Complete Colonoscopy    HERNIA REPAIR  02/11/2015    Hernia Repair    OTHER SURGICAL HISTORY  01/02/2018    Needle Biopsy Of Prostate        reports that he has never smoked. He has never used smokeless tobacco.    Review of Systems  Review of Systems   Constitutional:  Positive for activity change, appetite change and fatigue.   HENT:  Negative for rhinorrhea and sore throat.    Cardiovascular:  Negative for chest pain.   Gastrointestinal:  Positive for abdominal pain and nausea. Negative for abdominal distention, anal bleeding, blood in stool, rectal pain and vomiting.    Genitourinary:  Negative for decreased urine volume, difficulty urinating, frequency and urgency.   Musculoskeletal:  Negative for back pain.   Allergic/Immunologic: Negative for environmental allergies.   Neurological:  Negative for dizziness.                                  Objective    Vitals:    09/23/24 1236   BP: 151/85   Pulse: 60   Resp: 16   Temp: 36.5 °C (97.7 °F)   SpO2: 93%   Weight: 77.1 kg (170 lb)     No LMP for male patient.    Physical Exam  Vitals and nursing note reviewed. Exam conducted with a chaperone present.   Constitutional:       General: He is not in acute distress.     Appearance: Normal appearance. He is not ill-appearing, toxic-appearing or diaphoretic.   HENT:      Mouth/Throat:      Mouth: Mucous membranes are dry.   Eyes:      General: No scleral icterus.  Cardiovascular:      Rate and Rhythm: Normal rate and regular rhythm.   Pulmonary:      Effort: Pulmonary effort is normal.      Breath sounds: Normal breath sounds.   Abdominal:      General: Abdomen is flat.      Palpations: Abdomen is soft. There is no mass.      Tenderness: There is abdominal tenderness. There is no right CVA tenderness, left CVA tenderness, guarding or rebound.   Skin:     General: Skin is warm.   Neurological:      General: No focal deficit present.      Mental Status: He is alert and oriented to person, place, and time.   Psychiatric:         Mood and Affect: Mood normal.         Behavior: Behavior normal.         Procedures    Point of Care Test & Imaging Results from this visit  Results for orders placed or performed in visit on 09/23/24   POCT UA Automated manually resulted   Result Value Ref Range    POC Color, Urine Yellow Straw, Yellow, Light-Yellow    POC Appearance, Urine Clear Clear    POC Glucose, Urine NEGATIVE NEGATIVE mg/dl    POC Bilirubin, Urine NEGATIVE NEGATIVE    POC Ketones, Urine NEGATIVE NEGATIVE mg/dl    POC Specific Gravity, Urine 1.010 1.005 - 1.035    POC Blood, Urine NEGATIVE  NEGATIVE    POC PH, Urine 6.0 No Reference Range Established PH    POC Protein, Urine NEGATIVE NEGATIVE, 30 (1+) mg/dl    POC Urobilinogen, Urine 0.2 0.2, 1.0 EU/DL    Poc Nitrite, Urine NEGATIVE NEGATIVE    POC Leukocytes, Urine NEGATIVE NEGATIVE      No results found.    Diagnostic study results (if any) were reviewed by Delmy Gant.    Assessment/Plan   Allergies, medications, history, and pertinent labs/EKGs/Imaging reviewed by Delmy Gant.     Medical Decision Making  89-year-old with history of urinary retention, CA prostate, remote inguinal hernia repair with abdominal pain, belching, urine negative, urine culture results pending.  KUB abdominal results pending.  Advised to continue with clear and soft diet more frequent meals.  If new or worsening symptoms advised to go to the ER.    Orders and Diagnoses  Diagnoses and all orders for this visit:  Lower abdominal pain  -     POCT UA Automated manually resulted  -     Urine Culture  -     XR abdomen 2 views supine and erect or decub; Future  -     hyoscyamine (Levsin/SL) 0.125 mg disintegrating tablet; Take 1 tablet (0.125 mg) by mouth 4 times a day as needed (for abdomen discomfort) for up to 3 days.      Medical Admin Record      Patient disposition: Home    Electronically signed by eDlmy Gant  12:04 AM

## 2024-09-24 ENCOUNTER — TELEPHONE (OUTPATIENT)
Dept: URGENT CARE | Age: 89
End: 2024-09-24

## 2024-09-24 ASSESSMENT — ENCOUNTER SYMPTOMS
RHINORRHEA: 0
ABDOMINAL PAIN: 1
ACTIVITY CHANGE: 1
VOMITING: 0
APPETITE CHANGE: 1
BACK PAIN: 0
FREQUENCY: 0
BLOOD IN STOOL: 0
SORE THROAT: 0
ANAL BLEEDING: 0
DIZZINESS: 0
DIFFICULTY URINATING: 0
ABDOMINAL DISTENTION: 0
RECTAL PAIN: 0
NAUSEA: 1
FATIGUE: 1

## 2024-09-25 LAB — BACTERIA UR CULT: NO GROWTH

## 2024-09-27 ENCOUNTER — OFFICE VISIT (OUTPATIENT)
Dept: GERIATRIC MEDICINE | Facility: CLINIC | Age: 89
End: 2024-09-27
Payer: MEDICARE

## 2024-09-27 VITALS
DIASTOLIC BLOOD PRESSURE: 64 MMHG | WEIGHT: 167.5 LBS | HEART RATE: 55 BPM | TEMPERATURE: 96.2 F | SYSTOLIC BLOOD PRESSURE: 98 MMHG | BODY MASS INDEX: 22.1 KG/M2

## 2024-09-27 DIAGNOSIS — K64.9 HEMORRHOIDS, UNSPECIFIED HEMORRHOID TYPE: ICD-10-CM

## 2024-09-27 DIAGNOSIS — R25.1 TREMOR OF BOTH HANDS: ICD-10-CM

## 2024-09-27 DIAGNOSIS — Z23 NEED FOR INFLUENZA VACCINATION: ICD-10-CM

## 2024-09-27 DIAGNOSIS — Z23 NEED FOR INFLUENZA VACCINATION: Primary | ICD-10-CM

## 2024-09-27 DIAGNOSIS — K59.00 CONSTIPATION, UNSPECIFIED CONSTIPATION TYPE: ICD-10-CM

## 2024-09-27 DIAGNOSIS — E03.8 SUBCLINICAL HYPOTHYROIDISM: ICD-10-CM

## 2024-09-27 DIAGNOSIS — M54.12 CERVICAL RADICULOPATHY: ICD-10-CM

## 2024-09-27 DIAGNOSIS — C61 ADENOCARCINOMA OF PROSTATE (MULTI): ICD-10-CM

## 2024-09-27 DIAGNOSIS — K30 STOMACH UPSET: Primary | ICD-10-CM

## 2024-09-27 DIAGNOSIS — G25.0 ESSENTIAL TREMOR: ICD-10-CM

## 2024-09-27 DIAGNOSIS — Z23 NEED FOR PNEUMOCOCCAL 20-VALENT CONJUGATE VACCINATION: ICD-10-CM

## 2024-09-27 PROBLEM — Z95.0 CARDIAC PACEMAKER IN SITU: Status: ACTIVE | Noted: 2017-01-09

## 2024-09-27 PROBLEM — K57.90 DIVERTICULAR DISEASE: Status: ACTIVE | Noted: 2024-09-27

## 2024-09-27 PROBLEM — N40.0 BENIGN PROSTATIC HYPERPLASIA: Status: ACTIVE | Noted: 2023-05-22

## 2024-09-27 PROBLEM — D48.5 NEOPLASM OF UNCERTAIN BEHAVIOR OF SKIN: Status: ACTIVE | Noted: 2017-01-27

## 2024-09-27 PROBLEM — H61.20 IMPACTED CERUMEN: Status: ACTIVE | Noted: 2024-09-27

## 2024-09-27 PROBLEM — L29.89 OTHER PRURITUS: Status: ACTIVE | Noted: 2023-05-03

## 2024-09-27 PROBLEM — L21.8 OTHER SEBORRHEIC DERMATITIS: Status: ACTIVE | Noted: 2023-05-03

## 2024-09-27 PROBLEM — L29.8 OTHER PRURITUS: Status: ACTIVE | Noted: 2023-05-03

## 2024-09-27 PROCEDURE — 99215 OFFICE O/P EST HI 40 MIN: CPT | Performed by: NURSE PRACTITIONER

## 2024-09-27 PROCEDURE — 90662 IIV NO PRSV INCREASED AG IM: CPT | Performed by: NURSE PRACTITIONER

## 2024-09-27 PROCEDURE — 1157F ADVNC CARE PLAN IN RCRD: CPT | Performed by: NURSE PRACTITIONER

## 2024-09-27 PROCEDURE — G2212 PROLONG OUTPT/OFFICE VIS: HCPCS | Performed by: NURSE PRACTITIONER

## 2024-09-27 NOTE — PROGRESS NOTES
Subjective   Patient ID: Yayo Fraga Jr. is a 89 y.o. male who presents for Follow-up.  Yayo Fraga Jr. is an 89 y.o.  male who presents for urgent care visit follow-up. He is accompanied by his wife who is his primary historian. He had c/o abdominal pain/upset lower stomach-below umbilical area/nausea w/loss of appetite-cramping/twisted towel feeling/hot flashes-BM normal but smaller. No diarrhea, no blood in stools, increased belching, denies constipation. No vomiting. Spouse without similar sx. Remote hx of inguinal hernia repair, diverticulosis. Hx of prostate Ca, seen urology last Friday neg post void, urinalysis not done. No dysuria or hematuria. No flank pain. Denies constipation. Positive flatus. Concern for possible partial SBO/ileus from urgent imaging. Prescribed hyoscyamine (Levsin/SL) 0.125 mg disintegrating tablet; Take 1 tablet (0.125 mg) by mouth 4 times a day as needed (for abdomen discomfort) for up to 3 days. Patient has weaned himself from med at recommendation of DTR who is also NP. He remains without bothersome symptoms. He advanced diet last night with Raul's meal that it did tolerate well. Wanting recommendations on what to eat going forward. Agreeable to receiving influenza vaccine. Of note, patient shared/wife confirmed he does not drink very much water, 4 glasses a day.      Current Outpatient Medications on File Prior to Visit   Medication Sig Dispense Refill    alfuzosin (Uroxatral) 10 mg 24 hr tablet Take 1 tablet (10 mg) by mouth once daily. 90 tablet 3    bicalutamide (Casodex) 50 mg tablet Take 1 tablet (50 mg total) by mouth once daily. 30 tablet 11    cholecalciferol (Vitamin D-3) 50 mcg (2,000 unit) capsule Take 1 capsule (50 mcg) by mouth once daily.      finasteride (Proscar) 5 mg tablet Take 1 tablet (5 mg) by mouth once daily. 90 tablet 3    fish oil concentrate (Omega-3) 120-180 mg capsule Take 1 capsule (1 g) by mouth every other day.      hyoscyamine  (Levsin/SL) 0.125 mg disintegrating tablet Take 1 tablet (0.125 mg) by mouth 4 times a day as needed (for abdomen discomfort) for up to 3 days. 10 each 0    LYCOPENE ORAL Lycopene Oral Capsule  Refills: 0 NICKOLAS Zhong Leigh Start: 2-Dec-2022      magnesium oxide 500 mg magnesium tablet Take 1 tablet (500 mg) by mouth every other day.      simvastatin (Zocor) 40 mg tablet TAKE 1 TABLET BY MOUTH EVERY DAY 90 tablet 3    ZINC ACETATE ORAL Use in the mouth or throat.       No current facility-administered medications on file prior to visit.     Patient Active Problem List    Diagnosis Date Noted    Stomach upset 10/01/2024    Hemorrhoids 10/01/2024    Constipation 10/01/2024    Need for influenza vaccination 10/01/2024    Need for pneumococcal 20-valent conjugate vaccination 10/01/2024    Impacted cerumen 09/27/2024    Diverticular disease 09/27/2024    Tremor of both hands 06/05/2024    Essential tremor 06/05/2024    Syncope 10/02/2023    Condition not found 10/02/2023    Abnormal blood chemistry level 05/22/2023    Adenocarcinoma of prostate (Multi) 05/22/2023    Benign prostatic hyperplasia 05/22/2023    Tinnitus of both ears 05/22/2023    Cervical radiculopathy 05/22/2023    Chronic pain 05/22/2023    Mixed hyperlipidemia 05/22/2023    Impotence 05/22/2023    Prolonged QT interval 05/22/2023    Sick sinus syndrome (Multi) 05/22/2023    Subclinical hypothyroidism 05/22/2023    Vitamin D deficiency 05/22/2023    Other pruritus 05/03/2023    Other seborrheic dermatitis 05/03/2023    Neoplasm of uncertain behavior of skin 01/27/2017    Cardiac pacemaker in situ 01/09/2017    Other seborrheic keratosis 03/16/2016    Actinic keratosis 03/16/2016    Melanocytic nevi of trunk 03/16/2016    Long Q-T syndrome 01/11/2016    Sensorineural hearing loss, bilateral 09/23/2014    Impairment of auditory discrimination 09/23/2014    Left-sided sensorineural hearing loss 09/23/2014     Past Medical History:   Diagnosis  Date    Personal history of other diseases of the digestive system 01/02/2018    History of diverticulosis    Sick sinus syndrome (Multi) 01/02/2018    Sick sinus syndrome     Past Surgical History:   Procedure Laterality Date    CARDIAC PACEMAKER PLACEMENT  02/11/2015    Pacemaker Placement    COLONOSCOPY  02/11/2015    Complete Colonoscopy    HERNIA REPAIR  02/11/2015    Hernia Repair    OTHER SURGICAL HISTORY  01/02/2018    Needle Biopsy Of Prostate       Review of Systems   Gastrointestinal:  Positive for constipation.        Hemorrhoids       Objective   Visit Vitals  BP 98/64 (BP Location: Left arm, Patient Position: Sitting, BP Cuff Size: Adult)   Pulse 55   Temp 35.7 °C (96.2 °F) (Tympanic)   Wt 76 kg (167 lb 8 oz)   BMI 22.10 kg/m²   Smoking Status Never   BSA 1.98 m²       Physical Exam  Vitals (98/64, likely d/t dehydration) reviewed.   Constitutional:       Appearance: Normal appearance. He is normal weight.   HENT:      Head: Normocephalic.   Eyes:      Conjunctiva/sclera: Conjunctivae normal.   Cardiovascular:      Rate and Rhythm: Normal rate and regular rhythm.      Heart sounds: Normal heart sounds.   Neurological:      Mental Status: He is alert.   Psychiatric:         Mood and Affect: Mood normal.         Behavior: Behavior normal.         Thought Content: Thought content normal.         Judgment: Judgment normal.         Assessment/Plan   Yayo Fraga Jr. is an 89 y.o.  male who presents for urgent care visit follow-up. He is accompanied by his wife who is his primary historian. He had c/o abdominal pain/upset lower stomach-below umbilical area/nausea w/loss of appetite-cramping/twisted towel feeling/hot flashes-BM normal but smaller. No diarrhea, no blood in stools, increased belching, denies constipation. No vomiting. Spouse without similar sx. Remote hx of inguinal hernia repair, diverticulosis. Hx of prostate Ca, seen urology last Friday neg post void, urinalysis not done. No  dysuria or hematuria. No flank pain. Denies constipation. Positive flatus. Concern for possible partial SBO/ileus from urgent imaging. Prescribed hyoscyamine (Levsin/SL) 0.125 mg disintegrating tablet; Take 1 tablet (0.125 mg) by mouth 4 times a day as needed (for abdomen discomfort) for up to 3 days. Patient has weaned himself from med at recommendation of DTR who is also NP. He remains without bothersome symptoms. He advanced diet last night with Raul's meal that it did tolerate well. Wanting recommendations on what to eat going forward. Agreeable to receiving influenza vaccine. Of note, patient shared/wife confirmed he does not drink very much water, 4 glasses a day.  Problem List Items Addressed This Visit             ICD-10-CM    Stomach upset - Primary K30     Decaffeinated beverages    Advance diet slowly. Eat small meals. Introduce dairy in a few days to a week.    Drink 6-8 glasses of water a day         Hemorrhoids K64.9     Avoid sitting on toilet for extended periods of time.  Avoid straining when passing stool         Constipation K59.00    Need for influenza vaccination Z23    Need for pneumococcal 20-valent conjugate vaccination Z23       Time Spent  Prep time on day of patient encounter: 0 minutes  Time spent directly with patient, family or caregiver: 57 minutes  Additional Time Spent on Patient Care Activities: 0 minutes  Documentation Time: 12 minutes  Other Time Spent: 0 minutes  Total: 69 minutes        Mabel Cat, APRN-CNP

## 2024-09-27 NOTE — PATIENT INSTRUCTIONS
Decaffeinated beverages    Get your pneumonia vaccine in 2 weeks    Get your RSV in a month or later after pneumonia vaccine    Advance diet slowly. Eat small meals. Introduce diary in a few days to a week.    Drink 6-8 glasses of water a day

## 2024-10-01 PROBLEM — Z23 NEED FOR INFLUENZA VACCINATION: Status: ACTIVE | Noted: 2024-10-01

## 2024-10-01 PROBLEM — Z23 NEED FOR PNEUMOCOCCAL 20-VALENT CONJUGATE VACCINATION: Status: ACTIVE | Noted: 2024-10-01

## 2024-10-01 PROBLEM — K30 STOMACH UPSET: Status: ACTIVE | Noted: 2024-10-01

## 2024-10-01 PROBLEM — K59.00 CONSTIPATION: Status: ACTIVE | Noted: 2024-10-01

## 2024-10-01 PROBLEM — K64.9 HEMORRHOIDS: Status: ACTIVE | Noted: 2024-10-01

## 2024-10-01 ASSESSMENT — ENCOUNTER SYMPTOMS
CONSTIPATION: 1
ROS GI COMMENTS: HEMORRHOIDS

## 2024-10-01 NOTE — ASSESSMENT & PLAN NOTE
Decaffeinated beverages    Advance diet slowly. Eat small meals. Introduce dairy in a few days to a week.    Drink 6-8 glasses of water a day

## 2024-10-15 ENCOUNTER — APPOINTMENT (OUTPATIENT)
Dept: CARDIOLOGY | Facility: HOSPITAL | Age: 89
End: 2024-10-15
Payer: MEDICARE

## 2024-10-18 ENCOUNTER — HOSPITAL ENCOUNTER (OUTPATIENT)
Dept: CARDIOLOGY | Facility: HOSPITAL | Age: 89
Discharge: HOME | End: 2024-10-18
Payer: MEDICARE

## 2024-10-18 DIAGNOSIS — Z95.0 PACEMAKER: ICD-10-CM

## 2024-10-18 DIAGNOSIS — R00.1 BRADYCARDIA ON ECG: ICD-10-CM

## 2024-10-18 PROCEDURE — 93280 PM DEVICE PROGR EVAL DUAL: CPT

## 2024-10-28 ENCOUNTER — OFFICE VISIT (OUTPATIENT)
Dept: UROLOGY | Facility: HOSPITAL | Age: 89
End: 2024-10-28
Payer: MEDICARE

## 2024-10-28 DIAGNOSIS — N40.1 BENIGN PROSTATIC HYPERPLASIA WITH INCOMPLETE BLADDER EMPTYING: Primary | ICD-10-CM

## 2024-10-28 DIAGNOSIS — N41.0 ACUTE PROSTATITIS: ICD-10-CM

## 2024-10-28 DIAGNOSIS — R39.14 BENIGN PROSTATIC HYPERPLASIA WITH INCOMPLETE BLADDER EMPTYING: Primary | ICD-10-CM

## 2024-10-28 LAB
POC APPEARANCE, URINE: CLEAR
POC BILIRUBIN, URINE: NEGATIVE
POC BLOOD, URINE: NEGATIVE
POC COLOR, URINE: YELLOW
POC GLUCOSE, URINE: NEGATIVE MG/DL
POC KETONES, URINE: NEGATIVE MG/DL
POC LEUKOCYTES, URINE: NEGATIVE
POC NITRITE,URINE: NEGATIVE
POC PH, URINE: 7 PH
POC PROTEIN, URINE: NEGATIVE MG/DL
POC SPECIFIC GRAVITY, URINE: 1.02
POC UROBILINOGEN, URINE: 0.2 EU/DL

## 2024-10-28 PROCEDURE — 81003 URINALYSIS AUTO W/O SCOPE: CPT | Performed by: NURSE PRACTITIONER

## 2024-10-28 PROCEDURE — 51798 US URINE CAPACITY MEASURE: CPT | Performed by: NURSE PRACTITIONER

## 2024-10-28 PROCEDURE — 1125F AMNT PAIN NOTED PAIN PRSNT: CPT | Performed by: NURSE PRACTITIONER

## 2024-10-28 PROCEDURE — 1157F ADVNC CARE PLAN IN RCRD: CPT | Performed by: NURSE PRACTITIONER

## 2024-10-28 PROCEDURE — 1160F RVW MEDS BY RX/DR IN RCRD: CPT | Performed by: NURSE PRACTITIONER

## 2024-10-28 PROCEDURE — G2211 COMPLEX E/M VISIT ADD ON: HCPCS | Performed by: NURSE PRACTITIONER

## 2024-10-28 PROCEDURE — 1159F MED LIST DOCD IN RCRD: CPT | Performed by: NURSE PRACTITIONER

## 2024-10-28 PROCEDURE — 99214 OFFICE O/P EST MOD 30 MIN: CPT | Performed by: NURSE PRACTITIONER

## 2024-10-28 PROCEDURE — 1036F TOBACCO NON-USER: CPT | Performed by: NURSE PRACTITIONER

## 2024-10-28 RX ORDER — DOXYCYCLINE 100 MG/1
100 TABLET ORAL 2 TIMES DAILY
Qty: 28 TABLET | Refills: 0 | Status: SHIPPED | OUTPATIENT
Start: 2024-10-28 | End: 2024-11-11

## 2024-10-28 ASSESSMENT — PAIN SCALES - GENERAL: PAINLEVEL_OUTOF10: 2

## 2024-11-04 ENCOUNTER — APPOINTMENT (OUTPATIENT)
Dept: UROLOGY | Facility: HOSPITAL | Age: 89
End: 2024-11-04
Payer: MEDICARE

## 2024-11-04 ENCOUNTER — OFFICE VISIT (OUTPATIENT)
Dept: UROLOGY | Facility: HOSPITAL | Age: 89
End: 2024-11-04
Payer: MEDICARE

## 2024-11-04 ENCOUNTER — APPOINTMENT (OUTPATIENT)
Dept: LAB | Facility: LAB | Age: 89
End: 2024-11-04
Payer: MEDICARE

## 2024-11-04 DIAGNOSIS — R33.8 BENIGN PROSTATIC HYPERPLASIA WITH URINARY RETENTION: Primary | ICD-10-CM

## 2024-11-04 DIAGNOSIS — N40.1 BENIGN PROSTATIC HYPERPLASIA WITH URINARY RETENTION: Primary | ICD-10-CM

## 2024-11-04 DIAGNOSIS — R33.9 URINARY RETENTION: ICD-10-CM

## 2024-11-04 LAB
POC APPEARANCE, URINE: CLEAR
POC BILIRUBIN, URINE: NEGATIVE
POC BLOOD, URINE: ABNORMAL
POC COLOR, URINE: YELLOW
POC GLUCOSE, URINE: NEGATIVE MG/DL
POC KETONES, URINE: NEGATIVE MG/DL
POC LEUKOCYTES, URINE: ABNORMAL
POC NITRITE,URINE: NEGATIVE
POC PH, URINE: 6 PH
POC PROTEIN, URINE: ABNORMAL MG/DL
POC SPECIFIC GRAVITY, URINE: 1.02
POC UROBILINOGEN, URINE: 0.2 EU/DL

## 2024-11-04 PROCEDURE — 1126F AMNT PAIN NOTED NONE PRSNT: CPT | Performed by: NURSE PRACTITIONER

## 2024-11-04 PROCEDURE — 1159F MED LIST DOCD IN RCRD: CPT | Performed by: NURSE PRACTITIONER

## 2024-11-04 PROCEDURE — 1036F TOBACCO NON-USER: CPT | Performed by: NURSE PRACTITIONER

## 2024-11-04 PROCEDURE — 51702 INSERT TEMP BLADDER CATH: CPT | Performed by: NURSE PRACTITIONER

## 2024-11-04 PROCEDURE — 1157F ADVNC CARE PLAN IN RCRD: CPT | Performed by: NURSE PRACTITIONER

## 2024-11-04 PROCEDURE — 99213 OFFICE O/P EST LOW 20 MIN: CPT | Performed by: NURSE PRACTITIONER

## 2024-11-04 PROCEDURE — 1160F RVW MEDS BY RX/DR IN RCRD: CPT | Performed by: NURSE PRACTITIONER

## 2024-11-04 PROCEDURE — 87086 URINE CULTURE/COLONY COUNT: CPT

## 2024-11-04 PROCEDURE — 81003 URINALYSIS AUTO W/O SCOPE: CPT | Performed by: NURSE PRACTITIONER

## 2024-11-04 ASSESSMENT — PAIN SCALES - GENERAL: PAINLEVEL_OUTOF10: 0-NO PAIN

## 2024-11-04 NOTE — PROGRESS NOTES
[facility]  Outpatient Clinic Note    Patient Name:  Yayo Fraga Jr.  MRN:  [id number]  :  1935  Date of Service: 2024     Visit type: Follow up visit    HPI    Interval History:  Yayo Fraga Jr. is a 89 y.o. male who is being seen today for  problems listed below.     Problem list/Chief complaints:  BPH with obstructive LUTS - Taking alfuzosin and finasteride daily  Oligometastatic prostate cancer - on bicaltamide    24: Follow up visit with Martha Barnes CNP. Returns for FUV. Seen accompanied by daughter, Leatha. Tried going off alfuzosin for a week, noted increased straining. Stream is slow even on medication. No leakage, hematuria. NTF ~1.  23; 77 10/19/23; 113 today. PSA improved; continues to follow with oncology, scheduled for labs & FUV next month.     24: Patient presents for PVR. He is accompanied by his wife. They are going on vacation soon and he wanted to make sure he was not retaining too much urine. He feels like he's emptying his bladder and has no discomfort. He states he has to sit for a while to complete urinating. He has nocturia x2. He is not bothered enough with his urinary symptoms to change his treatment or consider procedure.    10/28/24: Patient presents for follow up. He is accompanied by his daughter. He reports constant dull rectal pain that is exacerbated with sitting at 5/10. He has a very slow urinary stream and states he usually has to sit for about 20 minutes to feel like he's emptied his bladder. He has nocturia x 2, no frequency or urgency during the day. No hematuria, no fever or chills.  ml, patient states he was unable to urinate properly because he was not sitting down. He is taking finasteride and alfuzosin as prescribed.     24: Patient presents due to inability to urinate. He is accompanied by his wife and daughter. Patient states 2 days ago he started to have difficulty urinating but was able to do so with  sitting on the toilet for a while with slow dribbles. This morning, he was unable to urinate so his daughter placed a madrid catheter and drained about 300 ml of urine. The madrid catheter felt uncomfortable so they removed it. He is taking finasteride and alfuzosin as prescribed. His PVR is 49 ml.    Past Medical History:   Diagnosis Date    Personal history of other diseases of the digestive system 01/02/2018    History of diverticulosis    Sick sinus syndrome (Multi) 01/02/2018    Sick sinus syndrome       Past Surgical History:   Procedure Laterality Date    CARDIAC PACEMAKER PLACEMENT  02/11/2015    Pacemaker Placement    COLONOSCOPY  02/11/2015    Complete Colonoscopy    HERNIA REPAIR  02/11/2015    Hernia Repair    OTHER SURGICAL HISTORY  01/02/2018    Needle Biopsy Of Prostate       Social History     Socioeconomic History    Marital status:      Spouse name: Not on file    Number of children: Not on file    Years of education: Not on file    Highest education level: Not on file   Occupational History    Not on file   Tobacco Use    Smoking status: Never    Smokeless tobacco: Never   Substance and Sexual Activity    Alcohol use: Not on file    Drug use: Not on file    Sexual activity: Not on file   Other Topics Concern    Not on file   Social History Narrative    Not on file     Social Drivers of Health     Financial Resource Strain: Not on file   Food Insecurity: Not on file   Transportation Needs: Not on file   Physical Activity: Not on file   Stress: Not on file   Social Connections: Not on file   Intimate Partner Violence: Not on file   Housing Stability: Not on file       No Known Allergies       Current Outpatient Medications:     alfuzosin (Uroxatral) 10 mg 24 hr tablet, Take 1 tablet (10 mg) by mouth once daily., Disp: 90 tablet, Rfl: 3    bicalutamide (Casodex) 50 mg tablet, Take 1 tablet (50 mg total) by mouth once daily., Disp: 30 tablet, Rfl: 11    cholecalciferol (Vitamin D-3) 50 mcg (2,000  unit) capsule, Take 1 capsule (50 mcg) by mouth once daily., Disp: , Rfl:     doxycycline (Adoxa) 100 mg tablet, Take 1 tablet (100 mg) by mouth 2 times a day for 14 days. Take with a full glass of water and do not lie down for at least 30 minutes after, Disp: 28 tablet, Rfl: 0    finasteride (Proscar) 5 mg tablet, Take 1 tablet (5 mg) by mouth once daily., Disp: 90 tablet, Rfl: 3    fish oil concentrate (Omega-3) 120-180 mg capsule, Take 1 capsule (1 g) by mouth every other day., Disp: , Rfl:     LYCOPENE ORAL, Lycopene Oral Capsule Refills: 0 NICKOLAS Zhong Leigh Start: 2-Dec-2022, Disp: , Rfl:     magnesium oxide 500 mg magnesium tablet, Take 1 tablet (500 mg) by mouth every other day., Disp: , Rfl:     simvastatin (Zocor) 40 mg tablet, TAKE 1 TABLET BY MOUTH EVERY DAY, Disp: 90 tablet, Rfl: 3    ZINC ACETATE ORAL, Use in the mouth or throat., Disp: , Rfl:      Review of system:  All other systems have been reviewed and are negative for complaints      Last recorded vitals:  There were no vitals taken for this visit.    Physical Exam:  General: Appears comfortable and in no apparent distress.  Head: Normocephalic, atraumatic  Eyes: Non-injected conjunctiva, sclera clear, no proptosis  Lungs: Breathing is easy, non-labored. Speaking in clear and complete sentences. Normal diaphragmatic movement.  Cardiovascular: no peripheral edema, cyanosis or pallor.   Abdomen: soft, non-distended, non-tender  : Bladder: non tender, not distended;Scrotum: no lesions, no cysts, no rashes  Urethra and meatus: normal size, position, no lesions or discharge  Penis: normal (uncircumcised) phallus, no palpable plaque, no lesions/masses/deformity  MSK: Ambulatory with steady gait, unassisted  Skin: No visible rashes or lesions  Neurologic: Alert, oriented to person, place, and time  Psychiatric: mood and affect appropriate    Labs  Office Visit on 11/04/2024   Component Date Value    POC Color, Urine 11/04/2024 Yellow      POC Appearance, Urine 11/04/2024 Clear     POC Glucose, Urine 11/04/2024 NEGATIVE     POC Bilirubin, Urine 11/04/2024 NEGATIVE     POC Ketones, Urine 11/04/2024 NEGATIVE     POC Specific Gravity, Ur* 11/04/2024 1.025     POC Blood, Urine 11/04/2024 TRACE-Intact (A)     POC PH, Urine 11/04/2024 6.0     POC Protein, Urine 11/04/2024 30 (1+)     POC Urobilinogen, Urine 11/04/2024 0.2     Poc Nitrite, Urine 11/04/2024 NEGATIVE     POC Leukocytes, Urine 11/04/2024 TRACE (A)        Assessment and Plan:  Yayo Fraga Jr. is a 89 y.o. male with history of oligometastatic prostate cancer on bicalutamide, BPH with obstructive LUTS, who presents for inability to urinate.     1.Today we discussed BPH. BPH is the benign growth of prostate tissue that may cause bothersome urinary symptoms. The mechanism of action as well as the risks, benefits, common side effects, and alternatives to all prescribed medications were discussed with the patient at length. The patient had the opportunity to ask questions and all questions were answered. I primarily discussed alpha blockers, 5ARIs, and PDE5i.  I explained that 5 alpha reductase inhibitors can shrink the prostate up to 30%, can artificially decrease their PSA value by 50%, but take approximately 6-9 months to reach full efficacy and have potential side effects to include decreased libido, impotence and breast tenderness. Additionally, if you continue to experience bothersome symptoms despite medication, there are minimally invasive procedures to alleviate these symptoms.    2.Bladder Catheterization  Prep: patient was prepped and draped in usual sterile fashion    Prep type: betadine; Urojet     Procedure Details    Procedure: catheter change      Catheter insertion: Urethral    Catheter size: 16 fr coude    Catheter provided by: health care organization      Number of initial attempts: 1    Urine characteristics: clear, yellow    Balloon inflation amount (mL) comment: 10 ml    Leg  bag attached: yes      Post-Procedure Details     Outcome: patient tolerated procedure well with no complications      Post-procedure interventions: post-procedure education provided      Disposition: discharged home in satisfactory condition         Plan:  -UA today with trace leukocytes and trace blood. Urine culture and sensitivity ordered to rule out infection. Will call patient with results.  -Madrid catheter placed, instructions on madrid catheter bag care provided  -Patient to complete doxycycline as prescribed for prostatitis  -Discussed the risks and benefit of continuing finasteride and alfuzosin, medications are working well for patient with no side effects. Discussed other management options. The patient and I agreed to continue with medications.  -Patient to use tylenol or ibuprofen as needed for pain  -Referral to Dr. Mcgarry to discuss BPH procedure placed    All questions and concerns were addressed. Patient verbalizes understanding and has no other questions at this time.     Some elements copied from my note on 10/28/24, the elements have been updated and all reflect current decision making from today, 11/04/24    E&M visit today is associated with current or anticipated ongoing medical care services related to a patient's single, serious condition or a complex condition.    CORNEL Hernandez-CNP   [facility]  11/04/24 2:43 PM

## 2024-11-05 LAB — BACTERIA UR CULT: NO GROWTH

## 2024-11-06 NOTE — PROGRESS NOTES
[facility]  Outpatient Clinic Note    Patient Name:  Yayo Fraga Jr.  MRN:  [id number]  :  1935  Date of Service: 2024     Visit type: Follow up visit    HPI    Interval History:  Yayo Fraga Jr. is a 89 y.o. male who is being seen today for  problems listed below.     Problem list/Chief complaints:  BPH with obstructive LUTS - Taking alfuzosin and finasteride daily  Oligometastatic prostate cancer - on bicaltamide    24: Follow up visit with Martha Barnes CNP. Returns for FUV. Seen accompanied by daughter, Leatha. Tried going off alfuzosin for a week, noted increased straining. Stream is slow even on medication. No leakage, hematuria. NTF ~1.  23; 77 10/19/23; 113 today. PSA improved; continues to follow with oncology, scheduled for labs & FUV next month.     24: Patient presents for PVR. He is accompanied by his wife. They are going on vacation soon and he wanted to make sure he was not retaining too much urine. He feels like he's emptying his bladder and has no discomfort. He states he has to sit for a while to complete urinating. He has nocturia x2. He is not bothered enough with his urinary symptoms to change his treatment or consider procedure.    10/28/24: Patient presents for follow up. He is accompanied by his daughter. He reports constant dull rectal pain that is exacerbated with sitting at 5/10. He has a very slow urinary stream and states he usually has to sit for about 20 minutes to feel like he's emptied his bladder. He has nocturia x 2, no frequency or urgency during the day. No hematuria, no fever or chills.  ml, patient states he was unable to urinate properly because he was not sitting down. He is taking finasteride and alfuzosin as prescribed.     24: Patient presents due to inability to urinate. He is accompanied by his wife and daughter. Patient states 2 days ago he started to have difficulty urinating but was able to do so with  sitting on the toilet for a while with slow dribbles. This morning, he was unable to urinate so his daughter placed a madrid catheter and drained about 300 ml of urine. The madrid catheter felt uncomfortable so they removed it. He is taking finasteride and alfuzosin as prescribed. His PVR is 49 ml.    Past Medical History:   Diagnosis Date    Personal history of other diseases of the digestive system 01/02/2018    History of diverticulosis    Sick sinus syndrome (Multi) 01/02/2018    Sick sinus syndrome       Past Surgical History:   Procedure Laterality Date    CARDIAC PACEMAKER PLACEMENT  02/11/2015    Pacemaker Placement    COLONOSCOPY  02/11/2015    Complete Colonoscopy    HERNIA REPAIR  02/11/2015    Hernia Repair    OTHER SURGICAL HISTORY  01/02/2018    Needle Biopsy Of Prostate       Social History     Socioeconomic History    Marital status:      Spouse name: Not on file    Number of children: Not on file    Years of education: Not on file    Highest education level: Not on file   Occupational History    Not on file   Tobacco Use    Smoking status: Never    Smokeless tobacco: Never   Substance and Sexual Activity    Alcohol use: Not on file    Drug use: Not on file    Sexual activity: Not on file   Other Topics Concern    Not on file   Social History Narrative    Not on file     Social Drivers of Health     Financial Resource Strain: Not on file   Food Insecurity: Not on file   Transportation Needs: Not on file   Physical Activity: Not on file   Stress: Not on file   Social Connections: Not on file   Intimate Partner Violence: Not on file   Housing Stability: Not on file       No Known Allergies       Current Outpatient Medications:     alfuzosin (Uroxatral) 10 mg 24 hr tablet, Take 1 tablet (10 mg) by mouth once daily., Disp: 90 tablet, Rfl: 3    bicalutamide (Casodex) 50 mg tablet, Take 1 tablet (50 mg total) by mouth once daily., Disp: 30 tablet, Rfl: 11    cholecalciferol (Vitamin D-3) 50 mcg (2,000  unit) capsule, Take 1 capsule (50 mcg) by mouth once daily., Disp: , Rfl:     doxycycline (Adoxa) 100 mg tablet, Take 1 tablet (100 mg) by mouth 2 times a day for 14 days. Take with a full glass of water and do not lie down for at least 30 minutes after, Disp: 28 tablet, Rfl: 0    finasteride (Proscar) 5 mg tablet, Take 1 tablet (5 mg) by mouth once daily., Disp: 90 tablet, Rfl: 3    fish oil concentrate (Omega-3) 120-180 mg capsule, Take 1 capsule (1 g) by mouth every other day., Disp: , Rfl:     LYCOPENE ORAL, Lycopene Oral Capsule Refills: 0 NICKOLAS Zhong Leigh Start: 2-Dec-2022, Disp: , Rfl:     magnesium oxide 500 mg magnesium tablet, Take 1 tablet (500 mg) by mouth every other day., Disp: , Rfl:     simvastatin (Zocor) 40 mg tablet, TAKE 1 TABLET BY MOUTH EVERY DAY, Disp: 90 tablet, Rfl: 3    ZINC ACETATE ORAL, Use in the mouth or throat., Disp: , Rfl:      Review of system:  All other systems have been reviewed and are negative for complaints      Last recorded vitals:  There were no vitals taken for this visit.    Physical Exam:  General: Appears comfortable and in no apparent distress.  Head: Normocephalic, atraumatic  Eyes: Non-injected conjunctiva, sclera clear, no proptosis  Lungs: Breathing is easy, non-labored. Speaking in clear and complete sentences. Normal diaphragmatic movement.  Cardiovascular: no peripheral edema, cyanosis or pallor.   Abdomen: soft, non-distended, non-tender  : Bladder: non tender, not distended;Scrotum: no lesions, no cysts, no rashes  Urethra and meatus: normal size, position, no lesions or discharge  Penis: normal (uncircumcised) phallus, no palpable plaque, no lesions/masses/deformity  MSK: Ambulatory with steady gait, unassisted  Skin: No visible rashes or lesions  Neurologic: Alert, oriented to person, place, and time  Psychiatric: mood and affect appropriate    Labs  Office Visit on 11/04/2024   Component Date Value    POC Color, Urine 11/04/2024 Yellow      POC Appearance, Urine 11/04/2024 Clear     POC Glucose, Urine 11/04/2024 NEGATIVE     POC Bilirubin, Urine 11/04/2024 NEGATIVE     POC Ketones, Urine 11/04/2024 NEGATIVE     POC Specific Gravity, Ur* 11/04/2024 1.025     POC Blood, Urine 11/04/2024 TRACE-Intact (A)     POC PH, Urine 11/04/2024 6.0     POC Protein, Urine 11/04/2024 30 (1+)     POC Urobilinogen, Urine 11/04/2024 0.2     Poc Nitrite, Urine 11/04/2024 NEGATIVE     POC Leukocytes, Urine 11/04/2024 TRACE (A)     Urine Culture 11/04/2024 No growth        Assessment and Plan:  Yayo Fraga Jr. is a 89 y.o. male with history of oligometastatic prostate cancer on bicalutamide, BPH with obstructive LUTS, who presents for inability to urinate.     1.Today we discussed BPH. BPH is the benign growth of prostate tissue that may cause bothersome urinary symptoms. The mechanism of action as well as the risks, benefits, common side effects, and alternatives to all prescribed medications were discussed with the patient at length. The patient had the opportunity to ask questions and all questions were answered. I primarily discussed alpha blockers, 5ARIs, and PDE5i.  I explained that 5 alpha reductase inhibitors can shrink the prostate up to 30%, can artificially decrease their PSA value by 50%, but take approximately 6-9 months to reach full efficacy and have potential side effects to include decreased libido, impotence and breast tenderness. Additionally, if you continue to experience bothersome symptoms despite medication, there are minimally invasive procedures to alleviate these symptoms.    2.Bladder Catheterization  Prep: patient was prepped and draped in usual sterile fashion    Prep type: betadine; Urojet     Procedure Details    Procedure: catheter change      Catheter insertion: Urethral    Catheter size: 16 fr coude    Catheter provided by: health care organization      Number of initial attempts: 1    Urine characteristics: clear, yellow    Balloon  inflation amount (mL) comment: 10 ml    Leg bag attached: yes      Post-Procedure Details     Outcome: patient tolerated procedure well with no complications      Post-procedure interventions: post-procedure education provided      Disposition: discharged home in satisfactory condition         Plan:      All questions and concerns were addressed. Patient verbalizes understanding and has no other questions at this time.     Some elements copied from my note on 11/4/24, the elements have been updated and all reflect current decision making from today, 11/06/24    E&M visit today is associated with current or anticipated ongoing medical care services related to a patient's single, serious condition or a complex condition.    CORNEL Hernandez-CNP   [facility]  11/06/24 11:04 AM

## 2024-11-07 ENCOUNTER — OFFICE VISIT (OUTPATIENT)
Dept: UROLOGY | Facility: HOSPITAL | Age: 89
End: 2024-11-07
Payer: MEDICARE

## 2024-11-07 DIAGNOSIS — N40.1 BPH WITH OBSTRUCTION/LOWER URINARY TRACT SYMPTOMS: Primary | ICD-10-CM

## 2024-11-07 DIAGNOSIS — N13.8 BPH WITH OBSTRUCTION/LOWER URINARY TRACT SYMPTOMS: Primary | ICD-10-CM

## 2024-11-07 DIAGNOSIS — R33.8 BENIGN PROSTATIC HYPERPLASIA WITH URINARY RETENTION: ICD-10-CM

## 2024-11-07 DIAGNOSIS — N40.1 BENIGN PROSTATIC HYPERPLASIA WITH URINARY RETENTION: ICD-10-CM

## 2024-11-07 PROCEDURE — 99214 OFFICE O/P EST MOD 30 MIN: CPT | Performed by: UROLOGY

## 2024-11-07 PROCEDURE — 1157F ADVNC CARE PLAN IN RCRD: CPT | Performed by: UROLOGY

## 2024-11-07 PROCEDURE — 99417 PROLNG OP E/M EACH 15 MIN: CPT | Performed by: UROLOGY

## 2024-11-07 NOTE — PROGRESS NOTES
HPI    89 y.o. male being seen with the following problem list:    Problem list:  BPH with obstructive LUTS - Taking alfuzosin and finasteride daily  Oligometastatic prostate cancer - on bicaltamide    He was unable to urinate so his daughter placed a madrid catheter and drained about 300 ml of urine. The madrid catheter felt uncomfortable so they removed it. He is taking finasteride and alfuzosin as prescribed. His PVR is 49 ml.     11/4 Ucx - neg   11/4 UA - neg    11/07/24 - BPH, urinary retention, currently on madrid cath. This is the 1st time he's had a cath in. On alfuzosin and finasteride. Has small hemorrhoids.         Lab Results   Component Value Date    PSA 1.77 08/19/2024    PSA 1.48 05/20/2024    PSA 1.13 11/22/2023    PSA 2.73 08/28/2023    PSA 4.36 (H) 08/14/2023              Current Medications:  Current Outpatient Medications   Medication Sig Dispense Refill    alfuzosin (Uroxatral) 10 mg 24 hr tablet Take 1 tablet (10 mg) by mouth once daily. 90 tablet 3    bicalutamide (Casodex) 50 mg tablet Take 1 tablet (50 mg total) by mouth once daily. 30 tablet 11    cholecalciferol (Vitamin D-3) 50 mcg (2,000 unit) capsule Take 1 capsule (50 mcg) by mouth once daily.      doxycycline (Adoxa) 100 mg tablet Take 1 tablet (100 mg) by mouth 2 times a day for 14 days. Take with a full glass of water and do not lie down for at least 30 minutes after 28 tablet 0    finasteride (Proscar) 5 mg tablet Take 1 tablet (5 mg) by mouth once daily. 90 tablet 3    fish oil concentrate (Omega-3) 120-180 mg capsule Take 1 capsule (1 g) by mouth every other day.      LYCOPENE ORAL Lycopene Oral Capsule  Refills: 0 NICKOLAS Zhong Leigh Start: 2-Dec-2022      magnesium oxide 500 mg magnesium tablet Take 1 tablet (500 mg) by mouth every other day.      simvastatin (Zocor) 40 mg tablet TAKE 1 TABLET BY MOUTH EVERY DAY 90 tablet 3    ZINC ACETATE ORAL Use in the mouth or throat.       No current facility-administered  medications for this visit.        Active Problems:  Yayo Fraga Jr. is a 89 y.o. male with the following Problems and Medications.  Patient Active Problem List   Diagnosis    Abnormal blood chemistry level    Adenocarcinoma of prostate (Multi)    Benign prostatic hyperplasia    Sensorineural hearing loss, bilateral    Tinnitus of both ears    Cervical radiculopathy    Chronic pain    Mixed hyperlipidemia    Impotence    Prolonged QT interval    Long Q-T syndrome    Cardiac pacemaker in situ    Sick sinus syndrome (Multi)    Subclinical hypothyroidism    Vitamin D deficiency    Syncope    Condition not found    Tremor of both hands    Essential tremor    Impairment of auditory discrimination    Impacted cerumen    Diverticular disease    Other seborrheic keratosis    Actinic keratosis    Other pruritus    Other seborrheic dermatitis    Neoplasm of uncertain behavior of skin    Melanocytic nevi of trunk    Left-sided sensorineural hearing loss    Stomach upset    Hemorrhoids    Constipation    Need for influenza vaccination    Need for pneumococcal 20-valent conjugate vaccination     Current Outpatient Medications   Medication Sig Dispense Refill    alfuzosin (Uroxatral) 10 mg 24 hr tablet Take 1 tablet (10 mg) by mouth once daily. 90 tablet 3    bicalutamide (Casodex) 50 mg tablet Take 1 tablet (50 mg total) by mouth once daily. 30 tablet 11    cholecalciferol (Vitamin D-3) 50 mcg (2,000 unit) capsule Take 1 capsule (50 mcg) by mouth once daily.      doxycycline (Adoxa) 100 mg tablet Take 1 tablet (100 mg) by mouth 2 times a day for 14 days. Take with a full glass of water and do not lie down for at least 30 minutes after 28 tablet 0    finasteride (Proscar) 5 mg tablet Take 1 tablet (5 mg) by mouth once daily. 90 tablet 3    fish oil concentrate (Omega-3) 120-180 mg capsule Take 1 capsule (1 g) by mouth every other day.      LYCOPENE ORAL Lycopene Oral Capsule  Refills: 0 NICKOLAS Zhong Start:  2-Dec-2022      magnesium oxide 500 mg magnesium tablet Take 1 tablet (500 mg) by mouth every other day.      simvastatin (Zocor) 40 mg tablet TAKE 1 TABLET BY MOUTH EVERY DAY 90 tablet 3    ZINC ACETATE ORAL Use in the mouth or throat.       No current facility-administered medications for this visit.       PMH:  Past Medical History:   Diagnosis Date    Personal history of other diseases of the digestive system 01/02/2018    History of diverticulosis    Sick sinus syndrome (Multi) 01/02/2018    Sick sinus syndrome       PSH:  Past Surgical History:   Procedure Laterality Date    CARDIAC PACEMAKER PLACEMENT  02/11/2015    Pacemaker Placement    COLONOSCOPY  02/11/2015    Complete Colonoscopy    HERNIA REPAIR  02/11/2015    Hernia Repair    OTHER SURGICAL HISTORY  01/02/2018    Needle Biopsy Of Prostate       FMH:  Family History   Problem Relation Name Age of Onset    Alzheimer's disease Mother      Alzheimer's disease Mother's Sister      Alzheimer's disease Maternal Grandmother         SHx:  Social History     Tobacco Use    Smoking status: Never    Smokeless tobacco: Never       Allergies:  No Known Allergies      Assessment/Plan  89 year old male with history of PCa, BPH, urinary retention on madrid cath currently. On finasteride and alfuzosin. I suggest he can try removing the cath on Monday and see how he does, possible bladder scan. I recommend bringing him for a cystoscopy to evaluate his channel. Given his history of PCa and treatment, he may benefit with a channel TURP vs HoLEP but will discuss treatment options at that time.     Plan:  Take cath out on Monday, can come in for spot bladder scan if he needs to. In uable to void on his own he can have a cath placed in either by us or his daughter.   Cystoscopy in 2 weeks to evaluate his channel.   Holding a spot for channel TURP vs holep in early dec 2024    Follow up in 2 weeks for cysto.     Scribe Attestation  By signing my name below, I, Bud Downey,  Scribe, attest that this documentation  has been prepared under the direction and in the presence of Jorge Mcgarry MD.

## 2024-11-08 ENCOUNTER — APPOINTMENT (OUTPATIENT)
Dept: UROLOGY | Facility: HOSPITAL | Age: 89
End: 2024-11-08
Payer: MEDICARE

## 2024-11-11 ENCOUNTER — LAB (OUTPATIENT)
Dept: LAB | Facility: HOSPITAL | Age: 89
End: 2024-11-11
Payer: MEDICARE

## 2024-11-11 ENCOUNTER — OFFICE VISIT (OUTPATIENT)
Dept: UROLOGY | Facility: HOSPITAL | Age: 89
End: 2024-11-11
Payer: MEDICARE

## 2024-11-11 DIAGNOSIS — R33.9 URINARY RETENTION: ICD-10-CM

## 2024-11-11 DIAGNOSIS — C61 ADENOCARCINOMA OF PROSTATE (MULTI): ICD-10-CM

## 2024-11-11 DIAGNOSIS — N40.0 BENIGN PROSTATIC HYPERPLASIA, UNSPECIFIED WHETHER LOWER URINARY TRACT SYMPTOMS PRESENT: Primary | ICD-10-CM

## 2024-11-11 LAB
ALBUMIN SERPL BCP-MCNC: 4.1 G/DL (ref 3.4–5)
ALP SERPL-CCNC: 52 U/L (ref 33–136)
ALT SERPL W P-5'-P-CCNC: 9 U/L (ref 10–52)
ANION GAP SERPL CALC-SCNC: 12 MMOL/L (ref 10–20)
AST SERPL W P-5'-P-CCNC: 17 U/L (ref 9–39)
BASOPHILS # BLD AUTO: 0.06 X10*3/UL (ref 0–0.1)
BASOPHILS NFR BLD AUTO: 0.8 %
BILIRUB SERPL-MCNC: 1.1 MG/DL (ref 0–1.2)
BUN SERPL-MCNC: 26 MG/DL (ref 6–23)
CALCIUM SERPL-MCNC: 8.5 MG/DL (ref 8.6–10.3)
CHLORIDE SERPL-SCNC: 104 MMOL/L (ref 98–107)
CO2 SERPL-SCNC: 28 MMOL/L (ref 21–32)
CREAT SERPL-MCNC: 1.27 MG/DL (ref 0.5–1.3)
EGFRCR SERPLBLD CKD-EPI 2021: 54 ML/MIN/1.73M*2
EOSINOPHIL # BLD AUTO: 0.19 X10*3/UL (ref 0–0.4)
EOSINOPHIL NFR BLD AUTO: 2.6 %
ERYTHROCYTE [DISTWIDTH] IN BLOOD BY AUTOMATED COUNT: 13.5 % (ref 11.5–14.5)
GLUCOSE SERPL-MCNC: 99 MG/DL (ref 74–99)
HCT VFR BLD AUTO: 40.9 % (ref 41–52)
HGB BLD-MCNC: 13.6 G/DL (ref 13.5–17.5)
IMM GRANULOCYTES # BLD AUTO: 0.01 X10*3/UL (ref 0–0.5)
IMM GRANULOCYTES NFR BLD AUTO: 0.1 % (ref 0–0.9)
LYMPHOCYTES # BLD AUTO: 2.03 X10*3/UL (ref 0.8–3)
LYMPHOCYTES NFR BLD AUTO: 27.6 %
MCH RBC QN AUTO: 30.7 PG (ref 26–34)
MCHC RBC AUTO-ENTMCNC: 33.3 G/DL (ref 32–36)
MCV RBC AUTO: 92 FL (ref 80–100)
MONOCYTES # BLD AUTO: 0.7 X10*3/UL (ref 0.05–0.8)
MONOCYTES NFR BLD AUTO: 9.5 %
NEUTROPHILS # BLD AUTO: 4.36 X10*3/UL (ref 1.6–5.5)
NEUTROPHILS NFR BLD AUTO: 59.4 %
NRBC BLD-RTO: 0 /100 WBCS (ref 0–0)
PLATELET # BLD AUTO: 185 X10*3/UL (ref 150–450)
POTASSIUM SERPL-SCNC: 3.9 MMOL/L (ref 3.5–5.3)
PROT SERPL-MCNC: 6.9 G/DL (ref 6.4–8.2)
PSA SERPL-MCNC: 1.9 NG/ML
RBC # BLD AUTO: 4.43 X10*6/UL (ref 4.5–5.9)
SODIUM SERPL-SCNC: 140 MMOL/L (ref 136–145)
TESTOST SERPL-MCNC: 847 NG/DL (ref 240–1000)
WBC # BLD AUTO: 7.4 X10*3/UL (ref 4.4–11.3)

## 2024-11-11 PROCEDURE — 85025 COMPLETE CBC W/AUTO DIFF WBC: CPT

## 2024-11-11 PROCEDURE — 1159F MED LIST DOCD IN RCRD: CPT | Performed by: UROLOGY

## 2024-11-11 PROCEDURE — 99215 OFFICE O/P EST HI 40 MIN: CPT | Performed by: UROLOGY

## 2024-11-11 PROCEDURE — 36415 COLL VENOUS BLD VENIPUNCTURE: CPT

## 2024-11-11 PROCEDURE — 84403 ASSAY OF TOTAL TESTOSTERONE: CPT

## 2024-11-11 PROCEDURE — 99417 PROLNG OP E/M EACH 15 MIN: CPT | Performed by: UROLOGY

## 2024-11-11 PROCEDURE — 84075 ASSAY ALKALINE PHOSPHATASE: CPT

## 2024-11-11 PROCEDURE — 84153 ASSAY OF PSA TOTAL: CPT

## 2024-11-11 PROCEDURE — 1157F ADVNC CARE PLAN IN RCRD: CPT | Performed by: UROLOGY

## 2024-11-11 NOTE — PROGRESS NOTES
HPI    89 y.o. male being seen as a walk in for urinary retention today    His madrid was removed this morning,and he has not voided since (came in around lunch today)  On finasteride/alfuzosin.  No major urge to pee  Finished doxy     From [facility] note, for reference:  BPH with obstructive LUTS - Taking alfuzosin and finasteride daily  Oligometastatic prostate cancer - on bicaltamide  Urinary retention    He was unable to urinate so his daughter placed a madrid catheter and drained about 300 ml of urine. The madrid catheter felt uncomfortable so they removed it at home. Since then he has been experiencing urinary retention. He is taking finasteride and alfuzosin as prescribed. His PVR is 277 ml.     Labs reviewed:  11/4 Ucx - neg   11/4 UA - neg      Lab Results   Component Value Date    PSA 1.90 11/11/2024    PSA 1.77 08/19/2024    PSA 1.48 05/20/2024    PSA 1.13 11/22/2023    PSA 2.73 08/28/2023              Current Medications:  Current Outpatient Medications   Medication Sig Dispense Refill    alfuzosin (Uroxatral) 10 mg 24 hr tablet Take 1 tablet (10 mg) by mouth once daily. 90 tablet 3    bicalutamide (Casodex) 50 mg tablet Take 1 tablet (50 mg total) by mouth once daily. 30 tablet 11    cholecalciferol (Vitamin D-3) 50 mcg (2,000 unit) capsule Take 1 capsule (50 mcg) by mouth once daily.      doxycycline (Adoxa) 100 mg tablet Take 1 tablet (100 mg) by mouth 2 times a day for 14 days. Take with a full glass of water and do not lie down for at least 30 minutes after 28 tablet 0    finasteride (Proscar) 5 mg tablet Take 1 tablet (5 mg) by mouth once daily. 90 tablet 3    fish oil concentrate (Omega-3) 120-180 mg capsule Take 1 capsule (1 g) by mouth every other day.      LYCOPENE ORAL Lycopene Oral Capsule  Refills: 0 NICKOLAS Zhong Leigh Start: 2-Dec-2022      magnesium oxide 500 mg magnesium tablet Take 1 tablet (500 mg) by mouth every other day.      simvastatin (Zocor) 40 mg tablet TAKE 1 TABLET BY  MOUTH EVERY DAY 90 tablet 3    ZINC ACETATE ORAL Use in the mouth or throat.       No current facility-administered medications for this visit.        Active Problems:  Yayo Fraga Jr. is a 89 y.o. male with the following Problems and Medications.  Patient Active Problem List   Diagnosis    Abnormal blood chemistry level    Adenocarcinoma of prostate (Multi)    BPH with obstruction/lower urinary tract symptoms    Sensorineural hearing loss, bilateral    Tinnitus of both ears    Cervical radiculopathy    Chronic pain    Mixed hyperlipidemia    Impotence    Prolonged QT interval    Long Q-T syndrome    Cardiac pacemaker in situ    Sick sinus syndrome (Multi)    Subclinical hypothyroidism    Vitamin D deficiency    Syncope    Condition not found    Tremor of both hands    Essential tremor    Impairment of auditory discrimination    Impacted cerumen    Diverticular disease    Other seborrheic keratosis    Actinic keratosis    Other pruritus    Other seborrheic dermatitis    Neoplasm of uncertain behavior of skin    Melanocytic nevi of trunk    Left-sided sensorineural hearing loss    Stomach upset    Hemorrhoids    Constipation    Need for influenza vaccination    Need for pneumococcal 20-valent conjugate vaccination     Current Outpatient Medications   Medication Sig Dispense Refill    alfuzosin (Uroxatral) 10 mg 24 hr tablet Take 1 tablet (10 mg) by mouth once daily. 90 tablet 3    bicalutamide (Casodex) 50 mg tablet Take 1 tablet (50 mg total) by mouth once daily. 30 tablet 11    cholecalciferol (Vitamin D-3) 50 mcg (2,000 unit) capsule Take 1 capsule (50 mcg) by mouth once daily.      doxycycline (Adoxa) 100 mg tablet Take 1 tablet (100 mg) by mouth 2 times a day for 14 days. Take with a full glass of water and do not lie down for at least 30 minutes after 28 tablet 0    finasteride (Proscar) 5 mg tablet Take 1 tablet (5 mg) by mouth once daily. 90 tablet 3    fish oil concentrate (Omega-3) 120-180 mg capsule Take  1 capsule (1 g) by mouth every other day.      LYCOPENE ORAL Lycopene Oral Capsule  Refills: 0 [id number], APRN-CNS Leigh Start: 2-Dec-2022      magnesium oxide 500 mg magnesium tablet Take 1 tablet (500 mg) by mouth every other day.      simvastatin (Zocor) 40 mg tablet TAKE 1 TABLET BY MOUTH EVERY DAY 90 tablet 3    ZINC ACETATE ORAL Use in the mouth or throat.       No current facility-administered medications for this visit.       PMH:  Past Medical History:   Diagnosis Date    Personal history of other diseases of the digestive system 01/02/2018    History of diverticulosis    Sick sinus syndrome (Multi) 01/02/2018    Sick sinus syndrome       PSH:  Past Surgical History:   Procedure Laterality Date    CARDIAC PACEMAKER PLACEMENT  02/11/2015    Pacemaker Placement    COLONOSCOPY  02/11/2015    Complete Colonoscopy    HERNIA REPAIR  02/11/2015    Hernia Repair    OTHER SURGICAL HISTORY  01/02/2018    Needle Biopsy Of Prostate       FMH:  Family History   Problem Relation Name Age of Onset    Alzheimer's disease Mother      Alzheimer's disease Mother's Sister      Alzheimer's disease Maternal Grandmother         SHx:  Social History     Tobacco Use    Smoking status: Never    Smokeless tobacco: Never       Allergies:  No Known Allergies      Assessment/Plan  89 year old male with history of PCa, BPH, urinary retention on madrid cath currently. On finasteride and alfuzosin.  Planned to have cysto with Dr. Mcgarry with potential outlet procedrue in setting or prostate ca in thenear future.    We discussed placing cath vs CIC with the patient and his wife and daughter (via phone who an  NP in urology. R/B/A of each option discussed. Taught how to do CIC at home. Will cath up to 6 times a day as needed, to keep volumes low.     G [id number]  Visit complexity inherent to evaluation and management (E&M) associated with medical care services that serve as the continuing focal point for all needed health care services and/or  with medical care services that are part of ongoing care related to a patient's single, serious condition or a complex condition.      Scribe Attestation  By signing my name below, I, Rosanne Coello, attest that this documentation  has been prepared under the direction and in the presence of Alex Nick MD.

## 2024-11-12 NOTE — PROGRESS NOTES
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 Arroyo, declined additional therapy and remained on Casodex  alone, PSA response, rafael 1.13 11/23     Provider Team  Burt Arroyo MD Thomas J King, MD Oliver Anchetta Sue Flick  PCP - Lupis Valente  EP / Cardiology f/u   Urology [facility]   Alex DAHL/ Jorge Mcgarry - urology     Other contributing history  -Hypothyroidism, sick sinus syndrome, pacemaker, osteopenia, long qt syndrome, BPH / LUTS urinary incontinence ( finasteride, alfuzosin), CIC       Interval history:  The patient presents for follow up.  The patient is accompanied by his daughter.  Having some increasing issues with lower urinary tract symptoms to the point that he was requiring intermittent catheterization is following up with urology they are planning on a cystoscopy.  He continues on the Casodex.  Denies any other major new symptoms.  Denies any ongoing issues with nausea, vomiting, fevers, chills, easy bruising or bleeding denies any issues with chest pain or chest tightness appetite and energy is otherwise stable.    ROS:  10-pt ROS reviewed and negative except as mentioned above.    Medications: below was reviewed and is accurate  Current Outpatient Medications   Medication Instructions    alfuzosin (UROXATRAL) 10 mg, oral, Daily     bicalutamide (CASODEX) 50 mg, oral, Daily    cholecalciferol (Vitamin D-3) 50 mcg (2,000 unit) capsule 1 capsule, oral, Daily    finasteride (PROSCAR) 5 mg, oral, Daily    fish oil concentrate (Omega-3) 120-180 mg capsule Take 1 capsule (1 g) by mouth every other day.    LYCOPENE ORAL Lycopene Oral Capsule  Refills: 0 NICKOLAS Zhong Leigh Start: 2-Dec-2022    magnesium oxide 500 mg magnesium tablet 1 tablet, oral, Every other day    simvastatin (Zocor) 40 mg tablet TAKE 1 TABLET BY MOUTH EVERY DAY    ZINC ACETATE ORAL Mouth/Throat        Detailed family history and social history reviewed in detail and updated per epic charting and in detail with the patient    Physical exam:  Vitals:    11/15/24 0911   BP: 123/77   BP Location: Left arm   Patient Position: Sitting   BP Cuff Size: Adult   Pulse: 80   Resp: 18   Temp: 36.3 °C (97.3 °F)   TempSrc: Temporal   SpO2: 98%   Weight: 76.2 kg (167 lb 15.9 oz)       GEN: NAD, well-appearing hard of hearing does have hearing aids in place  LYMPH: no palpable adenopathy  SKIN: no concerning new lesions some senile purpura appreciated  LUNGS: CTA  CV: RRR no clear R/G  ABD: Soft, NTND. No rebound or guarding.  EXT:  trace edema bilaterally   NEURO: Grossly intact. No focal deficits.  PSYCH: Normal mood and affect    Radiology review  Reviewed in detail personally and for detail see results in EPIC  Last scans 2021    Genomics Data    Laboratory review  Reviewed in detail personally and for detail see results in EPIC  .  Lab Results   Component Value Date    WBC 7.4 11/11/2024    HGB 13.6 11/11/2024    HCT 40.9 (L) 11/11/2024    MCV 92 11/11/2024     11/11/2024     Lab Results   Component Value Date    GLUCOSE 99 11/11/2024    CALCIUM 8.5 (L) 11/11/2024     11/11/2024    K 3.9 11/11/2024    CO2 28 11/11/2024     11/11/2024    BUN 26 (H) 11/11/2024    CREATININE 1.27 11/11/2024     Lab Results   Component Value Date    PSA 1.90 11/11/2024    PSA 1.77  08/19/2024    PSA 1.48 05/20/2024     Lab Results   Component Value Date    ALT 9 (L) 11/11/2024    AST 17 11/11/2024    ALKPHOS 52 11/11/2024    BILITOT 1.1 11/11/2024     Lab Results   Component Value Date    TESTOSTERONE 847 11/11/2024         ASSESSMENT AND PLAN     Prostate Cancer -slow rising PSA doubling time >1 yr, options discussed in detail last imaging was back in 2021, discussed also concerns may be with some increasing lower urinary tract symptoms the role of PET scan imaging again PET would only probably help with metastatic disease and they are still pretty clear they would like to hold off on ADT the cystoscopy will help delineate if there is any extension from the bladder into the prostate.  Will go ahead and monitor closely they want to hold off on escalating therapy will arrange follow-up in roughly 4 to 6 weeks with labs prior and discuss options upon progression.  CT findings 2021 pulmonary / pancreas findings - - role of repeat imaging discussed and he wanted to hold off for now  LUTS -continue follow-up with urology, planning on cystoscopy did update urology  Hypocalcemia-borderline did recommend calcium and vitamin D supplementation      Burt Arroyo MD  Senior Attending Physician/  in Medicine [employer]  [facility] / [facility]  Patient line [phone]  Fax [phone]

## 2024-11-15 ENCOUNTER — OFFICE VISIT (OUTPATIENT)
Dept: HEMATOLOGY/ONCOLOGY | Facility: HOSPITAL | Age: 89
End: 2024-11-15
Payer: MEDICARE

## 2024-11-15 VITALS
WEIGHT: 167.99 LBS | SYSTOLIC BLOOD PRESSURE: 123 MMHG | RESPIRATION RATE: 18 BRPM | DIASTOLIC BLOOD PRESSURE: 77 MMHG | HEART RATE: 80 BPM | BODY MASS INDEX: 22.16 KG/M2 | OXYGEN SATURATION: 98 % | TEMPERATURE: 97.3 F

## 2024-11-15 DIAGNOSIS — C61 ADENOCARCINOMA OF PROSTATE (MULTI): ICD-10-CM

## 2024-11-15 PROCEDURE — 1157F ADVNC CARE PLAN IN RCRD: CPT | Performed by: INTERNAL MEDICINE

## 2024-11-15 PROCEDURE — 1126F AMNT PAIN NOTED NONE PRSNT: CPT | Performed by: INTERNAL MEDICINE

## 2024-11-15 PROCEDURE — 1036F TOBACCO NON-USER: CPT | Performed by: INTERNAL MEDICINE

## 2024-11-15 PROCEDURE — 99214 OFFICE O/P EST MOD 30 MIN: CPT | Performed by: INTERNAL MEDICINE

## 2024-11-15 PROCEDURE — 1159F MED LIST DOCD IN RCRD: CPT | Performed by: INTERNAL MEDICINE

## 2024-11-15 ASSESSMENT — PAIN SCALES - GENERAL: PAINLEVEL_OUTOF10: 0-NO PAIN

## 2024-11-20 NOTE — PROGRESS NOTES
HPI    89 y.o. male being seen with the following problem list:    Problem list:  BPH with obstructive LUTS - Taking alfuzosin and finasteride daily  Oligometastatic prostate cancer - on bicaltamide     He was unable to urinate so his daughter placed a madrid catheter and drained about 300 ml of urine. The madrid catheter felt uncomfortable so they removed it. He is taking finasteride and alfuzosin as prescribed. His PVR is 49 ml.      11/4 Ucx - neg   11/4 UA - neg     11/07/24 - BPH, urinary retention, currently on madrid cath. This is the 1st time he's had a cath in. On alfuzosin and finasteride. Has small hemorrhoids.     11/11/24 - PVR 277cc with Dr. DAHL    11/21/24 - Presents today for cystoscopy. He is currently on SIC.         Lab Results   Component Value Date    PSA 1.90 11/11/2024    PSA 1.77 08/19/2024    PSA 1.48 05/20/2024    PSA 1.13 11/22/2023    PSA 2.73 08/28/2023              Current Medications:  Current Outpatient Medications   Medication Sig Dispense Refill    alfuzosin (Uroxatral) 10 mg 24 hr tablet Take 1 tablet (10 mg) by mouth once daily. 90 tablet 3    bicalutamide (Casodex) 50 mg tablet Take 1 tablet (50 mg total) by mouth once daily. 30 tablet 11    cholecalciferol (Vitamin D-3) 50 mcg (2,000 unit) capsule Take 1 capsule (50 mcg) by mouth once daily.      finasteride (Proscar) 5 mg tablet Take 1 tablet (5 mg) by mouth once daily. 90 tablet 3    fish oil concentrate (Omega-3) 120-180 mg capsule Take 1 capsule (1 g) by mouth every other day.      LYCOPENE ORAL Lycopene Oral Capsule  Refills: 0 [id number], APRN-LOULOU Leigh Start: 2-Dec-2022      magnesium oxide 500 mg magnesium tablet Take 1 tablet (500 mg) by mouth every other day.      simvastatin (Zocor) 40 mg tablet TAKE 1 TABLET BY MOUTH EVERY DAY 90 tablet 3    ZINC ACETATE ORAL Use in the mouth or throat.       No current facility-administered medications for this visit.        Active Problems:  Yayo Fraga  is a 89 y.o. male with  the following Problems and Medications.  Patient Active Problem List   Diagnosis    Abnormal blood chemistry level    Adenocarcinoma of prostate (Multi)    BPH with obstruction/lower urinary tract symptoms    Sensorineural hearing loss, bilateral    Tinnitus of both ears    Cervical radiculopathy    Chronic pain    Mixed hyperlipidemia    Impotence    Prolonged QT interval    Long Q-T syndrome    Cardiac pacemaker in situ    Sick sinus syndrome (Multi)    Subclinical hypothyroidism    Vitamin D deficiency    Syncope    Condition not found    Tremor of both hands    Essential tremor    Impairment of auditory discrimination    Impacted cerumen    Diverticular disease    Other seborrheic keratosis    Actinic keratosis    Other pruritus    Other seborrheic dermatitis    Neoplasm of uncertain behavior of skin    Melanocytic nevi of trunk    Left-sided sensorineural hearing loss    Stomach upset    Hemorrhoids    Constipation    Need for influenza vaccination    Need for pneumococcal 20-valent conjugate vaccination     Current Outpatient Medications   Medication Sig Dispense Refill    alfuzosin (Uroxatral) 10 mg 24 hr tablet Take 1 tablet (10 mg) by mouth once daily. 90 tablet 3    bicalutamide (Casodex) 50 mg tablet Take 1 tablet (50 mg total) by mouth once daily. 30 tablet 11    cholecalciferol (Vitamin D-3) 50 mcg (2,000 unit) capsule Take 1 capsule (50 mcg) by mouth once daily.      finasteride (Proscar) 5 mg tablet Take 1 tablet (5 mg) by mouth once daily. 90 tablet 3    fish oil concentrate (Omega-3) 120-180 mg capsule Take 1 capsule (1 g) by mouth every other day.      LYCOPENE ORAL Lycopene Oral Capsule  Refills: 0 [id number] Wong-LOULOU Leigh Start: 2-Dec-2022      magnesium oxide 500 mg magnesium tablet Take 1 tablet (500 mg) by mouth every other day.      simvastatin (Zocor) 40 mg tablet TAKE 1 TABLET BY MOUTH EVERY DAY 90 tablet 3    ZINC ACETATE ORAL Use in the mouth or throat.       No current  facility-administered medications for this visit.       PMH:  Past Medical History:   Diagnosis Date    Personal history of other diseases of the digestive system 01/02/2018    History of diverticulosis    Sick sinus syndrome (Multi) 01/02/2018    Sick sinus syndrome       PSH:  Past Surgical History:   Procedure Laterality Date    CARDIAC PACEMAKER PLACEMENT  02/11/2015    Pacemaker Placement    COLONOSCOPY  02/11/2015    Complete Colonoscopy    HERNIA REPAIR  02/11/2015    Hernia Repair    OTHER SURGICAL HISTORY  01/02/2018    Needle Biopsy Of Prostate       FMH:  Family History   Problem Relation Name Age of Onset    Alzheimer's disease Mother      Alzheimer's disease Mother's Sister      Alzheimer's disease Maternal Grandmother         SHx:  Social History     Tobacco Use    Smoking status: Never    Smokeless tobacco: Never       Allergies:  No Known Allergies    Procedure:  After informed consent was obtained, the patient was taken to the procedure room for cystoscopy due to urinary retenti.     Cystoscopy     Procedure Note:    A sterile prep and drape was performed in standard fashion. Lidocaine was used for topical anesthesia. A flexible cystoscope was inserted into the urethra without difficulty revealing normal urethra.     The prostate small, trilobar, large intravesical middle lobe.     Bladder heavily trabeculated with large diverticula, no foreign bodies, stones or papillary lesions. The ureteral orifices were visualized bilaterally. These were orthotopic in location and effluxing clear urine. No masses were seen on retroflexion.     Post-Procedure:   The cystoscope was removed. The vital signs were stable . The patient tolerated the procedure well. There were no complications.         Assessment/Plan  Cysto today reveals small, trilobar, large intravesical middle lobe. Bladder heavily trabeculated with large diverticula    We discussed surgical options for his prostate and bothersome LUTS. We  discussed various options including TURP, greenlight, Rezum, Urolift, HoLEP. We discussed HoLEP as a size-independent procedure that maximally de-obstructs the prostate with relatively less postop healing and recovery as compared to other modalities. We discussed the surgery in detail. Discussed the risks of bleeding, infection, scarring, transient incontinence, rare (<1%) risk of long-term incontinence. Discussed the perioperative pathway. Discussed the near certainty of permanent ejaculatory dysfunction, but likely no change to his baseline erectile function.     Cannot guarantee complete emptying, but likely will be catheter free postop. Will reassess postop.      Scribe Attestation  By signing my name below, I, Bud Downey, Rejiibe, attest that this documentation has been prepared under the direction and in the presence of Jorge Mcgarry MD.

## 2024-11-21 ENCOUNTER — APPOINTMENT (OUTPATIENT)
Dept: UROLOGY | Facility: HOSPITAL | Age: 89
End: 2024-11-21
Payer: MEDICARE

## 2024-11-21 ENCOUNTER — PREP FOR PROCEDURE (OUTPATIENT)
Dept: UROLOGY | Facility: HOSPITAL | Age: 89
End: 2024-11-21

## 2024-11-21 ENCOUNTER — PROCEDURE VISIT (OUTPATIENT)
Dept: UROLOGY | Facility: HOSPITAL | Age: 89
End: 2024-11-21
Payer: MEDICARE

## 2024-11-21 DIAGNOSIS — R33.8 BENIGN PROSTATIC HYPERPLASIA WITH URINARY RETENTION: Primary | ICD-10-CM

## 2024-11-21 DIAGNOSIS — N40.1 BENIGN PROSTATIC HYPERPLASIA WITH URINARY RETENTION: Primary | ICD-10-CM

## 2024-11-21 PROCEDURE — 52000 CYSTOURETHROSCOPY: CPT | Performed by: UROLOGY

## 2024-11-21 PROCEDURE — 99417 PROLNG OP E/M EACH 15 MIN: CPT | Performed by: UROLOGY

## 2024-11-21 PROCEDURE — 99214 OFFICE O/P EST MOD 30 MIN: CPT | Performed by: UROLOGY

## 2024-11-21 RX ORDER — CEFAZOLIN SODIUM 2 G/100ML
2 INJECTION, SOLUTION INTRAVENOUS ONCE
OUTPATIENT
Start: 2024-11-21 | End: 2024-11-21

## 2024-11-26 ENCOUNTER — HOSPITAL ENCOUNTER (OUTPATIENT)
Dept: RADIOLOGY | Facility: HOSPITAL | Age: 89
Discharge: HOME | End: 2024-11-26
Payer: MEDICARE

## 2024-11-26 ENCOUNTER — TELEPHONE (OUTPATIENT)
Dept: GERIATRIC MEDICINE | Facility: CLINIC | Age: 89
End: 2024-11-26

## 2024-11-26 ENCOUNTER — OFFICE VISIT (OUTPATIENT)
Dept: ORTHOPEDIC SURGERY | Facility: HOSPITAL | Age: 89
End: 2024-11-26
Payer: MEDICARE

## 2024-11-26 VITALS — HEIGHT: 73 IN | WEIGHT: 170 LBS | BODY MASS INDEX: 22.53 KG/M2

## 2024-11-26 DIAGNOSIS — M54.50 LUMBAR BACK PAIN: ICD-10-CM

## 2024-11-26 DIAGNOSIS — M47.817 DJD (DEGENERATIVE JOINT DISEASE), LUMBOSACRAL: Primary | ICD-10-CM

## 2024-11-26 DIAGNOSIS — M54.9 BACK PAIN, UNSPECIFIED BACK LOCATION, UNSPECIFIED BACK PAIN LATERALITY, UNSPECIFIED CHRONICITY: Primary | ICD-10-CM

## 2024-11-26 DIAGNOSIS — M54.41 RIGHT-SIDED LOW BACK PAIN WITH RIGHT-SIDED SCIATICA, UNSPECIFIED CHRONICITY: ICD-10-CM

## 2024-11-26 PROCEDURE — 1160F RVW MEDS BY RX/DR IN RCRD: CPT | Performed by: SPECIALIST/TECHNOLOGIST

## 2024-11-26 PROCEDURE — 72110 X-RAY EXAM L-2 SPINE 4/>VWS: CPT

## 2024-11-26 PROCEDURE — 1159F MED LIST DOCD IN RCRD: CPT | Performed by: SPECIALIST/TECHNOLOGIST

## 2024-11-26 PROCEDURE — 1125F AMNT PAIN NOTED PAIN PRSNT: CPT | Performed by: SPECIALIST/TECHNOLOGIST

## 2024-11-26 PROCEDURE — 99204 OFFICE O/P NEW MOD 45 MIN: CPT | Performed by: SPECIALIST/TECHNOLOGIST

## 2024-11-26 PROCEDURE — 1036F TOBACCO NON-USER: CPT | Performed by: SPECIALIST/TECHNOLOGIST

## 2024-11-26 PROCEDURE — 99214 OFFICE O/P EST MOD 30 MIN: CPT | Performed by: SPECIALIST/TECHNOLOGIST

## 2024-11-26 PROCEDURE — 1157F ADVNC CARE PLAN IN RCRD: CPT | Performed by: SPECIALIST/TECHNOLOGIST

## 2024-11-26 RX ORDER — METHYLPREDNISOLONE 4 MG/1
4 TABLET ORAL ONCE
Qty: 21 TABLET | Refills: 0 | Status: SHIPPED | OUTPATIENT
Start: 2024-11-26 | End: 2024-11-26

## 2024-11-26 ASSESSMENT — PAIN - FUNCTIONAL ASSESSMENT: PAIN_FUNCTIONAL_ASSESSMENT: 0-10

## 2024-11-26 ASSESSMENT — PAIN SCALES - GENERAL: PAINLEVEL_OUTOF10: 8

## 2024-11-26 NOTE — PROGRESS NOTES
Chief Complaint: Pain and Numbness of the Lower Back       HPI:  Yayo Fraga Jr. is a 89 y.o. male presenting to the orthopedic walk-in clinic with his wife and daughter, for low back pain occurring approximately 2 days ago when he was moving screens in his house.  He denies a specific injury or trauma.  Today he describes a shooting pain on the right lower back into the thigh.  Pain is worsened with laying.  He has been taking Tylenol since time of injury and sleeping in the recliner.  He uses a cane daily and presents today in the wheelchair secondary to difficulty with standing.  Denies numbness or tingling into the toes however does have some sensation into the posterior lateral and anterior thigh.  Denies prior back injuries.  Presents for treatment recommendations.    Objective     ROS:  Constitutional: No fever, no chills, not feeling tired, no recent weight gain and no recent weight loss  ENT: No nosebleeds  Cardiovascular: No chest pain  Respiratory: No shortness of breath and no cough  Gastrointestinal: No abdominal pain, no nausea, no diarrhea, and no vomiting  Musculoskeletal: Positive for low back pain  Integumentary: No rashes and no skin lesions  Neurological: No headache  Psychiatric: No sleep disturbances no depression  Endocrine: No muscle weakness and no muscle cramps  Hematologic/lymphatic: No swelling glands and no tendency for easy bruising    Past Medical History:   Diagnosis Date    Personal history of other diseases of the digestive system 01/02/2018    History of diverticulosis    Sick sinus syndrome (Multi) 01/02/2018    Sick sinus syndrome        Past Surgical History:   Procedure Laterality Date    CARDIAC PACEMAKER PLACEMENT  02/11/2015    Pacemaker Placement    COLONOSCOPY  02/11/2015    Complete Colonoscopy    HERNIA REPAIR  02/11/2015    Hernia Repair    OTHER SURGICAL HISTORY  01/02/2018    Needle Biopsy Of Prostate        Social Connections: Not on file          Physical  Exam:  General appearance: WN, WD male, in no acute distress  Skin: No rashes, lesions or wounds  Head: Normocephalic, no evidence of trauma  Eye: EOMI, conjunctiva clear, no discharge  ENT: Nares patent  Neck: No abnormal contour, tracheal midline  Chest/lungs: No respiratory distress, speaking in complete sentences  : No CVA tenderness  Musculoskeletal: Tender to palpation over the right lumbar paraspinals, SI joint, piriformis.  Positive slump test.  5/5 manual muscle strength hip abduction and adduction bilaterally.  4/5 hip flexion secondary to pain.  5/5 manual muscle testing knee flexion and extension bilaterally.  5/5 manual muscle testing ankle dorsiflexion, plantarflexion bilaterally.  2+ posterior tibialis pulses bilaterally.  No decreased ROM, muscle wasting, rigidity    Neurological: A&O x3, no focal deficits, intact bilateral LE  Psych: normal affect, mood, appearance      Image Results:  X-rays taken on 11/26/2024 were reviewed with the patient, his wife and daughter in the office today and show no acute fractures or dislocations.  He does have severe degenerative changes seen at L2-L3 L3-L4, L5-S1.      Assessment/Plan   Encounter Diagnoses:  DJD (degenerative joint disease), lumbosacral    Lumbar back pain    Right-sided low back pain with right-sided sciatica, unspecified chronicity    Orders Placed This Encounter    XR lumbar spine complete 4+ views    Referral to Physical Therapy    methylPREDNISolone (Medrol Dospak) 4 mg tablets       The patient and I discussed his clinical presentation and physical exam findings consistent with lumbar degenerative disc disease.  We agreed upon initiating conservative treatment.  We agreed upon a Medrol Dosepak.  This was sent to his pharmacy for him.  Additionally, we agreed upon a referral to physical therapy to focus on core strength and stability, glutes strength and stability, lower extremity flexibility and in office modalities.  They asked about  chiropractic adjustments and I suggest to hold off on this at this point.  It is okay for him to continue with massage therapy.  He can alternate heat and ice throughout the day.  He is scheduled to have an upcoming prostate procedure in the middle of December.  He will follow-up with one of my spine colleagues in the next 6-8 weeks if his symptoms persist or worsen.  He is in agreement the plan.  His questions have been answered.    ** This office note was dictated using Dragon voice to text software and was not proofread for spelling or grammatical errors **

## 2024-11-26 NOTE — TELEPHONE ENCOUNTER
I placed two referrals, one to [facility] for a chiropractor and another for an external referral to a chiropractor. I do not know of anyone I can refer him to specifically.

## 2024-12-02 ENCOUNTER — TELEPHONE (OUTPATIENT)
Dept: HEMATOLOGY/ONCOLOGY | Facility: CLINIC | Age: 89
End: 2024-12-02
Payer: MEDICARE

## 2024-12-02 NOTE — TELEPHONE ENCOUNTER
Called to discuss  The role of imaging to evaluate  Updated   The daughter sent the message and managing his care  Left our phone number to contact and manage

## 2024-12-03 ENCOUNTER — CLINICAL SUPPORT (OUTPATIENT)
Dept: PREADMISSION TESTING | Facility: HOSPITAL | Age: 89
End: 2024-12-03
Payer: MEDICARE

## 2024-12-03 DIAGNOSIS — N40.1 BENIGN PROSTATIC HYPERPLASIA WITH URINARY RETENTION: ICD-10-CM

## 2024-12-03 DIAGNOSIS — R33.8 BENIGN PROSTATIC HYPERPLASIA WITH URINARY RETENTION: ICD-10-CM

## 2024-12-03 RX ORDER — DEXTROMETHORPHAN HYDROBROMIDE, GUAIFENESIN 5; 100 MG/5ML; MG/5ML
650 LIQUID ORAL EVERY 8 HOURS PRN
COMMUNITY

## 2024-12-03 RX ORDER — IBUPROFEN 200 MG
400 TABLET ORAL EVERY 6 HOURS PRN
COMMUNITY

## 2024-12-06 ENCOUNTER — PRE-ADMISSION TESTING (OUTPATIENT)
Dept: PREADMISSION TESTING | Facility: HOSPITAL | Age: 89
End: 2024-12-06
Payer: MEDICARE

## 2024-12-06 ENCOUNTER — TELEPHONE (OUTPATIENT)
Dept: ADMISSION | Facility: HOSPITAL | Age: 89
End: 2024-12-06

## 2024-12-06 ENCOUNTER — OFFICE VISIT (OUTPATIENT)
Dept: ORTHOPEDIC SURGERY | Facility: CLINIC | Age: 89
End: 2024-12-06
Payer: MEDICARE

## 2024-12-06 ENCOUNTER — APPOINTMENT (OUTPATIENT)
Dept: ORTHOPEDIC SURGERY | Facility: CLINIC | Age: 89
End: 2024-12-06
Payer: MEDICARE

## 2024-12-06 ENCOUNTER — LAB (OUTPATIENT)
Dept: LAB | Facility: LAB | Age: 89
End: 2024-12-06
Payer: MEDICARE

## 2024-12-06 VITALS
HEIGHT: 72 IN | SYSTOLIC BLOOD PRESSURE: 104 MMHG | TEMPERATURE: 97.3 F | RESPIRATION RATE: 18 BRPM | DIASTOLIC BLOOD PRESSURE: 64 MMHG | OXYGEN SATURATION: 97 % | WEIGHT: 160.94 LBS | HEART RATE: 61 BPM | BODY MASS INDEX: 21.8 KG/M2

## 2024-12-06 DIAGNOSIS — N40.1 BENIGN PROSTATIC HYPERPLASIA WITH URINARY RETENTION: ICD-10-CM

## 2024-12-06 DIAGNOSIS — M47.816 LUMBAR SPONDYLOSIS: ICD-10-CM

## 2024-12-06 DIAGNOSIS — M54.16 LUMBAR RADICULOPATHY: ICD-10-CM

## 2024-12-06 DIAGNOSIS — R33.8 BENIGN PROSTATIC HYPERPLASIA WITH URINARY RETENTION: ICD-10-CM

## 2024-12-06 DIAGNOSIS — C61 ADENOCARCINOMA OF PROSTATE (MULTI): ICD-10-CM

## 2024-12-06 DIAGNOSIS — M54.41 ACUTE RIGHT-SIDED LOW BACK PAIN WITH RIGHT-SIDED SCIATICA: Primary | ICD-10-CM

## 2024-12-06 DIAGNOSIS — Z01.818 PREOP TESTING: Primary | ICD-10-CM

## 2024-12-06 LAB
ANION GAP SERPL CALC-SCNC: 12 MMOL/L (ref 10–20)
APPEARANCE UR: ABNORMAL
ATRIAL RATE: 83 BPM
BILIRUB UR STRIP.AUTO-MCNC: NEGATIVE MG/DL
BUN SERPL-MCNC: 25 MG/DL (ref 6–23)
CALCIUM SERPL-MCNC: 9 MG/DL (ref 8.6–10.3)
CHLORIDE SERPL-SCNC: 102 MMOL/L (ref 98–107)
CO2 SERPL-SCNC: 28 MMOL/L (ref 21–32)
COLOR UR: ABNORMAL
CREAT SERPL-MCNC: 1.11 MG/DL (ref 0.5–1.3)
EGFRCR SERPLBLD CKD-EPI 2021: 63 ML/MIN/1.73M*2
ERYTHROCYTE [DISTWIDTH] IN BLOOD BY AUTOMATED COUNT: 13.8 % (ref 11.5–14.5)
GLUCOSE SERPL-MCNC: 95 MG/DL (ref 74–99)
GLUCOSE UR STRIP.AUTO-MCNC: NORMAL MG/DL
HCT VFR BLD AUTO: 42.7 % (ref 41–52)
HGB BLD-MCNC: 14.1 G/DL (ref 13.5–17.5)
KETONES UR STRIP.AUTO-MCNC: NEGATIVE MG/DL
LEUKOCYTE ESTERASE UR QL STRIP.AUTO: ABNORMAL
MCH RBC QN AUTO: 30.3 PG (ref 26–34)
MCHC RBC AUTO-ENTMCNC: 33 G/DL (ref 32–36)
MCV RBC AUTO: 92 FL (ref 80–100)
NITRITE UR QL STRIP.AUTO: NEGATIVE
NRBC BLD-RTO: 0 /100 WBCS (ref 0–0)
PH UR STRIP.AUTO: 7.5 [PH]
PLATELET # BLD AUTO: 200 X10*3/UL (ref 150–450)
POTASSIUM SERPL-SCNC: 4.2 MMOL/L (ref 3.5–5.3)
PROT UR STRIP.AUTO-MCNC: NEGATIVE MG/DL
Q ONSET: 230 MS
QRS COUNT: 12 BEATS
QRS DURATION: 74 MS
QT INTERVAL: 414 MS
QTC CALCULATION(BAZETT): 459 MS
QTC FREDERICIA: 444 MS
R AXIS: 16 DEGREES
RBC # BLD AUTO: 4.66 X10*6/UL (ref 4.5–5.9)
RBC # UR STRIP.AUTO: ABNORMAL /UL
RBC #/AREA URNS AUTO: ABNORMAL /HPF
SODIUM SERPL-SCNC: 138 MMOL/L (ref 136–145)
SP GR UR STRIP.AUTO: 1.02
T AXIS: 46 DEGREES
T OFFSET: 437 MS
UROBILINOGEN UR STRIP.AUTO-MCNC: NORMAL MG/DL
VENTRICULAR RATE: 74 BPM
WBC # BLD AUTO: 10.9 X10*3/UL (ref 4.4–11.3)
WBC #/AREA URNS AUTO: >50 /HPF

## 2024-12-06 PROCEDURE — 1159F MED LIST DOCD IN RCRD: CPT | Performed by: STUDENT IN AN ORGANIZED HEALTH CARE EDUCATION/TRAINING PROGRAM

## 2024-12-06 PROCEDURE — 99214 OFFICE O/P EST MOD 30 MIN: CPT | Performed by: STUDENT IN AN ORGANIZED HEALTH CARE EDUCATION/TRAINING PROGRAM

## 2024-12-06 PROCEDURE — 1125F AMNT PAIN NOTED PAIN PRSNT: CPT | Performed by: STUDENT IN AN ORGANIZED HEALTH CARE EDUCATION/TRAINING PROGRAM

## 2024-12-06 PROCEDURE — 1157F ADVNC CARE PLAN IN RCRD: CPT | Performed by: STUDENT IN AN ORGANIZED HEALTH CARE EDUCATION/TRAINING PROGRAM

## 2024-12-06 PROCEDURE — 93005 ELECTROCARDIOGRAM TRACING: CPT | Performed by: PHYSICIAN ASSISTANT

## 2024-12-06 PROCEDURE — 85027 COMPLETE CBC AUTOMATED: CPT

## 2024-12-06 PROCEDURE — G2211 COMPLEX E/M VISIT ADD ON: HCPCS | Performed by: STUDENT IN AN ORGANIZED HEALTH CARE EDUCATION/TRAINING PROGRAM

## 2024-12-06 PROCEDURE — 93010 ELECTROCARDIOGRAM REPORT: CPT | Performed by: INTERNAL MEDICINE

## 2024-12-06 PROCEDURE — 36415 COLL VENOUS BLD VENIPUNCTURE: CPT

## 2024-12-06 PROCEDURE — 87086 URINE CULTURE/COLONY COUNT: CPT

## 2024-12-06 PROCEDURE — 99204 OFFICE O/P NEW MOD 45 MIN: CPT | Performed by: PHYSICIAN ASSISTANT

## 2024-12-06 PROCEDURE — 81001 URINALYSIS AUTO W/SCOPE: CPT

## 2024-12-06 PROCEDURE — 80048 BASIC METABOLIC PNL TOTAL CA: CPT

## 2024-12-06 RX ORDER — GABAPENTIN 300 MG/1
300 CAPSULE ORAL 2 TIMES DAILY PRN
Qty: 28 CAPSULE | Refills: 0 | Status: SHIPPED | OUTPATIENT
Start: 2024-12-06 | End: 2024-12-20

## 2024-12-06 RX ORDER — BICALUTAMIDE 50 MG/1
50 TABLET, FILM COATED ORAL DAILY
Qty: 30 TABLET | Refills: 11 | Status: SHIPPED | OUTPATIENT
Start: 2024-12-06

## 2024-12-06 RX ORDER — MELOXICAM 15 MG/1
15 TABLET ORAL DAILY
Qty: 21 TABLET | Refills: 0 | Status: SHIPPED | OUTPATIENT
Start: 2024-12-06 | End: 2024-12-27

## 2024-12-06 ASSESSMENT — ENCOUNTER SYMPTOMS
EYE DISCHARGE: 0
FEVER: 0
WOUND: 0
DYSURIA: 0
NUMBNESS: 0
WHEEZING: 0
DIARRHEA: 0
UNEXPECTED WEIGHT CHANGE: 0
CONSTIPATION: 0
VOMITING: 0
SINUS CONGESTION: 0
SKIN CHANGES: 0
LIMITED RANGE OF MOTION: 0
BRUISES/BLEEDS EASILY: 0
RHINORRHEA: 0
VISUAL CHANGE: 0
BLOOD IN STOOL: 0
ABDOMINAL PAIN: 0
DYSPNEA WITH EXERTION: 0
CHILLS: 0
DOUBLE VISION: 0
ARTHRALGIAS: 1
DIFFICULTY URINATING: 1
NAUSEA: 0
LIGHT-HEADEDNESS: 0
HEMOPTYSIS: 0
EXCESSIVE BLEEDING: 0
MYALGIAS: 0
TROUBLE SWALLOWING: 0
EYE PAIN: 0
NECK STIFFNESS: 0
WEAKNESS: 0
PALPITATIONS: 0
NECK PAIN: 0
CONFUSION: 0
COUGH: 0
DYSPNEA AT REST: 0
SHORTNESS OF BREATH: 0
ABDOMINAL DISTENTION: 0

## 2024-12-06 ASSESSMENT — PAIN SCALES - GENERAL: PAINLEVEL_OUTOF10: 6

## 2024-12-06 ASSESSMENT — PAIN - FUNCTIONAL ASSESSMENT: PAIN_FUNCTIONAL_ASSESSMENT: 0-10

## 2024-12-06 NOTE — H&P (VIEW-ONLY)
"[facility]/PAT Evaluation       Name: Yayo Fraga Jr. (Yayo Fraga Jr.)  /Age: 1935/89 y.o.       Date of Consult: 24    Referring Provider: Dr. Mcgarry    Surgery, Date, and Length:  [place] Laser Enucleation Transurethral Prostate , 24, 90MIN    Yayo Fraga Jr. is a 89 year-old male who presents to the [facility] for perioperative risk assessment prior to surgery.    Patient presents with a primary diagnosis of BPH with urinary retention / incomplete bladder emptying.  He has had indwelling [place] catheter in the past; none currently.  Currently using self catheterization twice daily. Post void residuals have been about 300cc.  Cystoscopy perforemd 24 showed \"prostate small, trilobar, large intravesical middle lobe. Bladder heavily trabeculated with large diverticula, no foreign bodies, stones or papillary lesions. \"    Pt denies dysuria, f/c/n/v or gross hematuria.     This note was created in part upon personal review of patient's medical records.      Patient is scheduled to have [place] Laser Enucleation Transurethral Prostate    Pt mentions prolonged sedation with anesthesia \"many years ago\".  Pt denies any past history of anesthetic complications such as PONV, awareness, prolonged sedation, dental damage, aspiration, cardiac arrest, difficult intubation, difficult I.V. access or unexpected hospital admissions.  NO malignant hyperthermia and or pseudocholinesterase deficiency.  No history of blood transfusions     The patient is not a [employer] and will accept blood and blood products if medically indicated.   Type and screen NOT sent.     Past Medical History:   Diagnosis Date    BPH (benign prostatic hyperplasia)     Cervical radiculopathy     Chronic back pain     just completed steriod taper, takes tylenol or Motrin for pain as needed, walks with cane    Delayed emergence from general anesthesia     Hemorrhoids     HLD (hyperlipidemia)     [place] (hard of hearing)     wears hearing " aids    Hypothyroidism     TSH=4.23 12/7/23    Impotence     Long Q-T syndrome     Lower urinary tract symptoms (LUTS)     Personal history of other diseases of the digestive system 01/02/2018    History of diverticulosis    Prostate CA (Multi)     Sick sinus syndrome (Multi) 01/02/2018    Sick sinus syndrome, has a pacemaker    Syncope     pre-pacer, no issues since pacemaker    Tinnitus     Tremor     Bilateral hands, left worse than right    Urinary retention     St Caths self 2-3x/day    Vitamin D deficiency        Past Surgical History:   Procedure Laterality Date    CARDIAC PACEMAKER PLACEMENT  02/11/2015    Pacemaker Placement    COLONOSCOPY  02/11/2015    Complete Colonoscopy    HERNIA REPAIR  02/11/2015    Hernia Repair    OTHER SURGICAL HISTORY  01/02/2018    Needle Biopsy Of Prostate       Patient Sexual activity questions deferred to the physician.    Family History   Problem Relation Name Age of Onset    Alzheimer's disease Mother      Prostate cancer Father      Prostate cancer Brother      Alzheimer's disease Maternal Grandmother      Alzheimer's disease Mother's Sister       Social History     Socioeconomic History    Marital status:      Spouse name: Not on file    Number of children: Not on file    Years of education: Not on file    Highest education level: Not on file   Occupational History    Not on file   Tobacco Use    Smoking status: Never    Smokeless tobacco: Never   Vaping Use    Vaping status: Never Used   Substance and Sexual Activity    Alcohol use: Not Currently     Comment: 1 beer every few weeks    Drug use: Never    Sexual activity: Defer   Other Topics Concern    Not on file   Social History Narrative    Not on file     Social Drivers of Health     Financial Resource Strain: Not on file   Food Insecurity: Not on file   Transportation Needs: Not on file   Physical Activity: Not on file   Stress: Not on file   Social Connections: Not on file   Intimate Partner Violence: Not on  file   Housing Stability: Not on file        No Known Allergies      Current Outpatient Medications:     acetaminophen (Tylenol 8 HOUR) 650 mg ER tablet, Take 1 tablet (650 mg) by mouth every 8 hours if needed for mild pain (1 - 3). Do not crush, chew, or split., Disp: , Rfl:     alfuzosin (Uroxatral) 10 mg 24 hr tablet, Take 1 tablet (10 mg) by mouth once daily., Disp: 90 tablet, Rfl: 3    cholecalciferol (Vitamin D-3) 50 mcg (2,000 unit) capsule, Take 1 capsule (50 mcg) by mouth once daily., Disp: , Rfl:     finasteride (Proscar) 5 mg tablet, Take 1 tablet (5 mg) by mouth once daily., Disp: 90 tablet, Rfl: 3    fish oil concentrate (Omega-3) 120-180 mg capsule, Take 1 capsule (1 g) by mouth every other day., Disp: , Rfl:     ibuprofen 200 mg tablet, Take 2 tablets (400 mg) by mouth every 6 hours if needed for mild pain (1 - 3)., Disp: , Rfl:     LYCOPENE ORAL, once daily., Disp: , Rfl:     magnesium oxide 500 mg magnesium tablet, Take 1 tablet (500 mg) by mouth every other day., Disp: , Rfl:     simvastatin (Zocor) 40 mg tablet, TAKE 1 TABLET BY MOUTH EVERY DAY, Disp: 90 tablet, Rfl: 3    bicalutamide (Casodex) 50 mg tablet, Take 1 tablet (50 mg total) by mouth once daily., Disp: 30 tablet, Rfl: 11    meloxicam (Mobic) 15 mg tablet, Take 1 tablet (15 mg) by mouth once daily for 21 days., Disp: 21 tablet, Rfl: 0         PAT ROS:   Constitutional:    no fever   no chills   no unexpected weight change  Neuro/Psych:    no numbness   no weakness   no light-headedness   no confusion  Eyes:    no discharge   no pain   no vision loss   no diplopia   no visual disturbance  Ears:    no ear pain   hearing loss   no tinnitus   hearing aides  Nose:    no nasal discharge   no sinus congestion   no epistaxis  Mouth:    no dental issues   no mouth pain   no oral bleeding   no mouth lesions  Throat:    no throat pain   no dysphagia  Neck:    no neck pain   no neck stiffness  Cardio:    Functional 4 Mets. Patient denies SOB walking  up 2 flights of stairs   Walking 30 minutes a few times per week;gym strengthening class up until 3 weeks ago   no chest pain   no palpitations   no peripheral edema   no dyspnea   no JONES  Respiratory:    no cough   no wheezing   no hemoptysis   no shortness of breath  Endocrine:    no cold intolerance   no heat intolerance  GI:    no abdominal distention   no abdominal pain   no constipation   no diarrhea   no nausea   no vomiting   no blood in stool  :    difficulty urinating   no dysuria   no oliguria  Musculoskeletal:    arthralgias (LBP, RLE, right hip, right knee)   no myalgias   no decreased ROM  Hematologic:    does not bruise/bleed easily   no excessive bleeding   no history of blood transfusion   no blood clots  Skin:   no skin changes   no sores/wound   no rash      Physical Exam  Constitutional:       General: He is not in acute distress.     Appearance: Normal appearance. He is not ill-appearing, toxic-appearing or diaphoretic.   HENT:      Head: Normocephalic and atraumatic.      Nose: Nose normal. No congestion or rhinorrhea.      Mouth/Throat:      Mouth: Mucous membranes are moist.      Pharynx: No posterior oropharyngeal erythema.   Eyes:      Extraocular Movements: Extraocular movements intact.      Conjunctiva/sclera: Conjunctivae normal.   Cardiovascular:      Rate and Rhythm: Normal rate and regular rhythm.      Pulses: Normal pulses.      Heart sounds: Normal heart sounds. No murmur heard.     No friction rub. No gallop.   Pulmonary:      Effort: Pulmonary effort is normal. No respiratory distress.      Breath sounds: Normal breath sounds. No stridor. No wheezing, rhonchi or rales.   Abdominal:      General: Bowel sounds are normal. There is no distension.      Palpations: Abdomen is soft. There is no mass.      Tenderness: There is no abdominal tenderness. There is no guarding or rebound.      Hernia: No hernia is present.   Musculoskeletal:         General: No swelling, tenderness,  "deformity or signs of injury. Normal range of motion.      Cervical back: Normal range of motion and neck supple. No rigidity or tenderness.   Skin:     General: Skin is warm and dry.      Coloration: Skin is not jaundiced or pale.      Findings: No bruising, erythema, lesion or rash.   Neurological:      General: No focal deficit present.      Mental Status: He is alert and oriented to person, place, and time.      Cranial Nerves: No cranial nerve deficit.      Sensory: No sensory deficit.      Motor: No weakness.      Coordination: Coordination normal.   Psychiatric:         Mood and Affect: Mood normal.         Behavior: Behavior normal.          PAT AIRWAY:   Airway:     Mallampati::  II    Neck ROM::  Full   Few missing teeth; no loose teeth        Visit Vitals  /64   Pulse 61   Temp 36.3 °C (97.3 °F)   Resp 18   Ht 1.816 m (5' 11.5\")   Wt 73 kg (160 lb 15 oz)   SpO2 97%   BMI 22.13 kg/m²   Smoking Status Never   BSA 1.92 m²      LABS:  Lab Results   Component Value Date    WBC 10.9 12/06/2024    HGB 14.1 12/06/2024    HCT 42.7 12/06/2024    MCV 92 12/06/2024     12/06/2024      Lab Results   Component Value Date    GLUCOSE 95 12/06/2024    CALCIUM 9.0 12/06/2024     12/06/2024    K 4.2 12/06/2024    CO2 28 12/06/2024     12/06/2024    BUN 25 (H) 12/06/2024    CREATININE 1.11 12/06/2024      Microscopic Only, Urine  Order: [id number] - Reflex for Order [id number]   Status: Final result       Visible to patient: Yes (not seen)       Dx: Benign prostatic hyperplasia with uri...    0 Result Notes      Component  Ref Range & Units 10:15   WBC, Urine  1-5, NONE /HPF >50 Abnormal    RBC, Urine  NONE, 1-2, 3-5 /HPF 11-20 Abnormal    Resulting Agency [facility]             Specimen Collected: 12/06/24 10:15     Urinalysis with Reflex Culture and Microscopic  Order: [id number] - Part of Panel Order [id number]   Status: Final result       Visible to patient: Yes (not seen)       Dx: Benign prostatic " hyperplasia with uri...    0 Result Notes          Component  Ref Range & Units 10:15  (12/6/24) 1 yr ago  (2/14/23) 2 yr ago  (7/12/22) 3 yr ago  (6/2/21) 3 yr ago  (4/23/21)   Color, Urine  Light-Yellow, Yellow, Dark-Yellow Light-Yellow YELLOW R YELLOW R YELLOW R YELLOW R   Appearance, Urine  Clear Turbid Normal (N) CLEAR R CLEAR R CLEAR R CLEAR R   Specific Gravity, Urine  1.005 - 1.035 1.018 1.020 1.015 1.019 1.019   pH, Urine  5.0, 5.5, 6.0, 6.5, 7.0, 7.5, 8.0 7.5 6.5 R 5.0 R 6.0 R 6.0 R   Protein, Urine  NEGATIVE, 10 (TRACE), 20 (TRACE) mg/dL NEGATIVE TRACE R NEGATIVE R NEGATIVE R NEGATIVE R   Glucose, Urine  Normal mg/dL Normal NEGATIVE R NEGATIVE R NEGATIVE R NEGATIVE R   Blood, Urine  NEGATIVE 0.03 (TRACE) Abnormal  NEGATIVE NEGATIVE NEGATIVE NEGATIVE   Ketones, Urine  NEGATIVE mg/dL NEGATIVE NEGATIVE NEGATIVE NEGATIVE NEGATIVE   Bilirubin, Urine  NEGATIVE NEGATIVE NEGATIVE NEGATIVE NEGATIVE NEGATIVE   Urobilinogen, Urine  Normal mg/dL Normal <2.0 R <2.0 R <2.0 R <2.0 R   Nitrite, Urine  NEGATIVE NEGATIVE NEGATIVE NEGATIVE NEGATIVE NEGATIVE   Leukocyte Esterase, Urine  NEGATIVE 500 Jamir/µL Abnormal  NEGATIVE NEGATIVE NEGATIVE NEGATIVE   Resulting Agency [facility] [place]             Specimen Collected: 12/06/24 10:15     EKG 12/6/24  Atrial pacer  Vent rate = 74 bpm    Assessment and Plan:     Patient is a 89-year-old male scheduled for a [place] Laser Enucleation Transurethral Prostate with Dr. Mcgarry on 12/13/24.    Patient has no active cardiac symptoms.   Patient denies any chest pain, tightness, heaviness, pressure, radiating pain, palpitations, irregular heartbeats, lightheadedness, cough, congestion, shortness of breath, JONES, PND, near syncope, weight loss or gain.     RCRI  0  , 3.9 % Risk of MACE    Cardiac:  AV block - s/p pacemaker 2008    HLD - cont statin on dos     Endocrine:  Hypothyroidism - cont levothyroxine on dos     :  Prostate cancer - cont bicalutamide on dos      Hematology:  Patient instructed to ambulate as soon as possible postoperatively to decrease thromboembolic risk.   Initiate mechanical DVT prophylaxis as soon as possible and initiate chemical prophylaxis when deemed safe from a bleeding standpoint post surgery.     LABS: CBC, BMP, UA flex and EKG ordered. Lab results reviewed and show possible UTI. Ucx pending.    Followup: Ucx pending.    STOP BANG: male, >50 = 2    Caprini: 7    Risk assessment complete.  Patient is scheduled for a intermediate surgical risk procedure.        Preoperative medication instructions were provided and reviewed with the patient.  Any additional testing or evaluation was explained to the patient.  Nothing by mouth instructions were discussed and patient's questions were answered prior to conclusion to this encounter.  Patient verbalized understanding of preoperative instructions given in preadmission testing; discharge instructions available in EMR.    This note was dictated by a speech recognition.  Minor errors may have been detected in a speech recognition.

## 2024-12-06 NOTE — CPM/PAT H&P
"[facility]/PAT Evaluation       Name: Yayo Fraga Jr. (Yayo Fraga Jr.)  /Age: 1935/89 y.o.       Date of Consult: 24    Referring Provider: Dr. Mcgarry    Surgery, Date, and Length:  [place] Laser Enucleation Transurethral Prostate , 24, 90MIN    Yayo Fraga Jr. is a 89 year-old male who presents to the [facility] for perioperative risk assessment prior to surgery.    Patient presents with a primary diagnosis of BPH with urinary retention / incomplete bladder emptying.  He has had indwelling madrid catheter in the past; none currently.  Currently using self catheterization twice daily. Post void residuals have been about 300cc.  Cystoscopy perforemd 24 showed \"prostate small, trilobar, large intravesical middle lobe. Bladder heavily trabeculated with large diverticula, no foreign bodies, stones or papillary lesions. \"    Pt denies dysuria, f/c/n/v or gross hematuria.     This note was created in part upon personal review of patient's medical records.      Patient is scheduled to have [place] Laser Enucleation Transurethral Prostate    Pt mentions prolonged sedation with anesthesia \"many years ago\".  Pt denies any past history of anesthetic complications such as PONV, awareness, prolonged sedation, dental damage, aspiration, cardiac arrest, difficult intubation, difficult I.V. access or unexpected hospital admissions.  NO malignant hyperthermia and or pseudocholinesterase deficiency.  No history of blood transfusions     The patient is not a Shinto and will accept blood and blood products if medically indicated.   Type and screen NOT sent.     Past Medical History:   Diagnosis Date    BPH (benign prostatic hyperplasia)     Cervical radiculopathy     Chronic back pain     just completed steriod taper, takes tylenol or Motrin for pain as needed, walks with cane    Delayed emergence from general anesthesia     Hemorrhoids     HLD (hyperlipidemia)     [place] (hard of hearing)     wears hearing " aids    Hypothyroidism     TSH=4.23 12/7/23    Impotence     Long Q-T syndrome     Lower urinary tract symptoms (LUTS)     Personal history of other diseases of the digestive system 01/02/2018    History of diverticulosis    Prostate CA (Multi)     Sick sinus syndrome (Multi) 01/02/2018    Sick sinus syndrome, has a pacemaker    Syncope     pre-pacer, no issues since pacemaker    Tinnitus     Tremor     Bilateral hands, left worse than right    Urinary retention     St Caths self 2-3x/day    Vitamin D deficiency        Past Surgical History:   Procedure Laterality Date    CARDIAC PACEMAKER PLACEMENT  02/11/2015    Pacemaker Placement    COLONOSCOPY  02/11/2015    Complete Colonoscopy    HERNIA REPAIR  02/11/2015    Hernia Repair    OTHER SURGICAL HISTORY  01/02/2018    Needle Biopsy Of Prostate       Patient Sexual activity questions deferred to the physician.    Family History   Problem Relation Name Age of Onset    Alzheimer's disease Mother      Prostate cancer Father      Prostate cancer Brother      Alzheimer's disease Maternal Grandmother      Alzheimer's disease Mother's Sister       Social History     Socioeconomic History    Marital status:      Spouse name: Not on file    Number of children: Not on file    Years of education: Not on file    Highest education level: Not on file   Occupational History    Not on file   Tobacco Use    Smoking status: Never    Smokeless tobacco: Never   Vaping Use    Vaping status: Never Used   Substance and Sexual Activity    Alcohol use: Not Currently     Comment: 1 beer every few weeks    Drug use: Never    Sexual activity: Defer   Other Topics Concern    Not on file   Social History Narrative    Not on file     Social Drivers of Health     Financial Resource Strain: Not on file   Food Insecurity: Not on file   Transportation Needs: Not on file   Physical Activity: Not on file   Stress: Not on file   Social Connections: Not on file   Intimate Partner Violence: Not on  file   Housing Stability: Not on file        No Known Allergies      Current Outpatient Medications:     acetaminophen (Tylenol 8 HOUR) 650 mg ER tablet, Take 1 tablet (650 mg) by mouth every 8 hours if needed for mild pain (1 - 3). Do not crush, chew, or split., Disp: , Rfl:     alfuzosin (Uroxatral) 10 mg 24 hr tablet, Take 1 tablet (10 mg) by mouth once daily., Disp: 90 tablet, Rfl: 3    cholecalciferol (Vitamin D-3) 50 mcg (2,000 unit) capsule, Take 1 capsule (50 mcg) by mouth once daily., Disp: , Rfl:     finasteride (Proscar) 5 mg tablet, Take 1 tablet (5 mg) by mouth once daily., Disp: 90 tablet, Rfl: 3    fish oil concentrate (Omega-3) 120-180 mg capsule, Take 1 capsule (1 g) by mouth every other day., Disp: , Rfl:     ibuprofen 200 mg tablet, Take 2 tablets (400 mg) by mouth every 6 hours if needed for mild pain (1 - 3)., Disp: , Rfl:     LYCOPENE ORAL, once daily., Disp: , Rfl:     magnesium oxide 500 mg magnesium tablet, Take 1 tablet (500 mg) by mouth every other day., Disp: , Rfl:     simvastatin (Zocor) 40 mg tablet, TAKE 1 TABLET BY MOUTH EVERY DAY, Disp: 90 tablet, Rfl: 3    bicalutamide (Casodex) 50 mg tablet, Take 1 tablet (50 mg total) by mouth once daily., Disp: 30 tablet, Rfl: 11    meloxicam (Mobic) 15 mg tablet, Take 1 tablet (15 mg) by mouth once daily for 21 days., Disp: 21 tablet, Rfl: 0         PAT ROS:   Constitutional:    no fever   no chills   no unexpected weight change  Neuro/Psych:    no numbness   no weakness   no light-headedness   no confusion  Eyes:    no discharge   no pain   no vision loss   no diplopia   no visual disturbance  Ears:    no ear pain   hearing loss   no tinnitus   hearing aides  Nose:    no nasal discharge   no sinus congestion   no epistaxis  Mouth:    no dental issues   no mouth pain   no oral bleeding   no mouth lesions  Throat:    no throat pain   no dysphagia  Neck:    no neck pain   no neck stiffness  Cardio:    Functional 4 Mets. Patient denies SOB walking  up 2 flights of stairs   Walking 30 minutes a few times per week;gym strengthening class up until 3 weeks ago   no chest pain   no palpitations   no peripheral edema   no dyspnea   no JONES  Respiratory:    no cough   no wheezing   no hemoptysis   no shortness of breath  Endocrine:    no cold intolerance   no heat intolerance  GI:    no abdominal distention   no abdominal pain   no constipation   no diarrhea   no nausea   no vomiting   no blood in stool  :    difficulty urinating   no dysuria   no oliguria  Musculoskeletal:    arthralgias (LBP, RLE, right hip, right knee)   no myalgias   no decreased ROM  Hematologic:    does not bruise/bleed easily   no excessive bleeding   no history of blood transfusion   no blood clots  Skin:   no skin changes   no sores/wound   no rash      Physical Exam  Constitutional:       General: He is not in acute distress.     Appearance: Normal appearance. He is not ill-appearing, toxic-appearing or diaphoretic.   HENT:      Head: Normocephalic and atraumatic.      Nose: Nose normal. No congestion or rhinorrhea.      Mouth/Throat:      Mouth: Mucous membranes are moist.      Pharynx: No posterior oropharyngeal erythema.   Eyes:      Extraocular Movements: Extraocular movements intact.      Conjunctiva/sclera: Conjunctivae normal.   Cardiovascular:      Rate and Rhythm: Normal rate and regular rhythm.      Pulses: Normal pulses.      Heart sounds: Normal heart sounds. No murmur heard.     No friction rub. No gallop.   Pulmonary:      Effort: Pulmonary effort is normal. No respiratory distress.      Breath sounds: Normal breath sounds. No stridor. No wheezing, rhonchi or rales.   Abdominal:      General: Bowel sounds are normal. There is no distension.      Palpations: Abdomen is soft. There is no mass.      Tenderness: There is no abdominal tenderness. There is no guarding or rebound.      Hernia: No hernia is present.   Musculoskeletal:         General: No swelling, tenderness,  "deformity or signs of injury. Normal range of motion.      Cervical back: Normal range of motion and neck supple. No rigidity or tenderness.   Skin:     General: Skin is warm and dry.      Coloration: Skin is not jaundiced or pale.      Findings: No bruising, erythema, lesion or rash.   Neurological:      General: No focal deficit present.      Mental Status: He is alert and oriented to person, place, and time.      Cranial Nerves: No cranial nerve deficit.      Sensory: No sensory deficit.      Motor: No weakness.      Coordination: Coordination normal.   Psychiatric:         Mood and Affect: Mood normal.         Behavior: Behavior normal.          PAT AIRWAY:   Airway:     Mallampati::  II    Neck ROM::  Full   Few missing teeth; no loose teeth        Visit Vitals  /64   Pulse 61   Temp 36.3 °C (97.3 °F)   Resp 18   Ht 1.816 m (5' 11.5\")   Wt 73 kg (160 lb 15 oz)   SpO2 97%   BMI 22.13 kg/m²   Smoking Status Never   BSA 1.92 m²      LABS:  Lab Results   Component Value Date    WBC 10.9 12/06/2024    HGB 14.1 12/06/2024    HCT 42.7 12/06/2024    MCV 92 12/06/2024     12/06/2024      Lab Results   Component Value Date    GLUCOSE 95 12/06/2024    CALCIUM 9.0 12/06/2024     12/06/2024    K 4.2 12/06/2024    CO2 28 12/06/2024     12/06/2024    BUN 25 (H) 12/06/2024    CREATININE 1.11 12/06/2024      Microscopic Only, Urine  Order: [id number] - Reflex for Order [id number]   Status: Final result       Visible to patient: Yes (not seen)       Dx: Benign prostatic hyperplasia with uri...    0 Result Notes      Component  Ref Range & Units 10:15   WBC, Urine  1-5, NONE /HPF >50 Abnormal    RBC, Urine  NONE, 1-2, 3-5 /HPF 11-20 Abnormal    Resulting Agency [facility]             Specimen Collected: 12/06/24 10:15     Urinalysis with Reflex Culture and Microscopic  Order: [id number] - Part of Panel Order [id number]   Status: Final result       Visible to patient: Yes (not seen)       Dx: Benign prostatic " hyperplasia with uri...    0 Result Notes          Component  Ref Range & Units 10:15  (12/6/24) 1 yr ago  (2/14/23) 2 yr ago  (7/12/22) 3 yr ago  (6/2/21) 3 yr ago  (4/23/21)   Color, Urine  Light-Yellow, Yellow, Dark-Yellow Light-Yellow YELLOW R YELLOW R YELLOW R YELLOW R   Appearance, Urine  Clear Turbid Normal (N) CLEAR R CLEAR R CLEAR R CLEAR R   Specific Gravity, Urine  1.005 - 1.035 1.018 1.020 1.015 1.019 1.019   pH, Urine  5.0, 5.5, 6.0, 6.5, 7.0, 7.5, 8.0 7.5 6.5 R 5.0 R 6.0 R 6.0 R   Protein, Urine  NEGATIVE, 10 (TRACE), 20 (TRACE) mg/dL NEGATIVE TRACE R NEGATIVE R NEGATIVE R NEGATIVE R   Glucose, Urine  Normal mg/dL Normal NEGATIVE R NEGATIVE R NEGATIVE R NEGATIVE R   Blood, Urine  NEGATIVE 0.03 (TRACE) Abnormal  NEGATIVE NEGATIVE NEGATIVE NEGATIVE   Ketones, Urine  NEGATIVE mg/dL NEGATIVE NEGATIVE NEGATIVE NEGATIVE NEGATIVE   Bilirubin, Urine  NEGATIVE NEGATIVE NEGATIVE NEGATIVE NEGATIVE NEGATIVE   Urobilinogen, Urine  Normal mg/dL Normal <2.0 R <2.0 R <2.0 R <2.0 R   Nitrite, Urine  NEGATIVE NEGATIVE NEGATIVE NEGATIVE NEGATIVE NEGATIVE   Leukocyte Esterase, Urine  NEGATIVE 500 Jamir/µL Abnormal  NEGATIVE NEGATIVE NEGATIVE NEGATIVE   Resulting Agency [facility]             Specimen Collected: 12/06/24 10:15     EKG 12/6/24  Atrial pacer  Vent rate = 74 bpm    Assessment and Plan:     Patient is a 89-year-old male scheduled for a [place] Laser Enucleation Transurethral Prostate with Dr. Mcgarry on 12/13/24.    Patient has no active cardiac symptoms.   Patient denies any chest pain, tightness, heaviness, pressure, radiating pain, palpitations, irregular heartbeats, lightheadedness, cough, congestion, shortness of breath, JONES, PND, near syncope, weight loss or gain.     RCRI  0  , 3.9 % Risk of MACE    Cardiac:  AV block - s/p pacemaker 2008    HLD - cont statin on dos     Endocrine:  Hypothyroidism - cont levothyroxine on dos     :  Prostate cancer - cont bicalutamide on dos      Hematology:  Patient instructed to ambulate as soon as possible postoperatively to decrease thromboembolic risk.   Initiate mechanical DVT prophylaxis as soon as possible and initiate chemical prophylaxis when deemed safe from a bleeding standpoint post surgery.     LABS: CBC, BMP, UA flex and EKG ordered. Lab results reviewed and show possible UTI. Ucx pending.    Followup: Ucx pending.    STOP BANG: male, >50 = 2    Caprini: 7    Risk assessment complete.  Patient is scheduled for a intermediate surgical risk procedure.        Preoperative medication instructions were provided and reviewed with the patient.  Any additional testing or evaluation was explained to the patient.  Nothing by mouth instructions were discussed and patient's questions were answered prior to conclusion to this encounter.  Patient verbalized understanding of preoperative instructions given in preadmission testing; discharge instructions available in EMR.    This note was dictated by a speech recognition.  Minor errors may have been detected in a speech recognition.

## 2024-12-06 NOTE — PROGRESS NOTES
Orthopaedic Spine Surgery Clinic Note    Patient ID: Yayo Fraga Jr. is a 89 y.o. male.    Chief Complaint  Chief Complaint   Patient presents with    Lower Back - Pain       History of Present Illness  Mr. Fraga is a pleasant 89-year-old male who presents for initial evaluation of acute right-sided low back and lower extremity pain.  Patient states there was no inciting event or injury.  Approximately 1-1/2 weeks ago, he started experiencing pain on the right side of his low back that proceeded down into his right hip and over to his right thigh that terminated at his knee.  Intermittently he would experience some paresthesias down in the lower leg on the right side.  Denies left lower extremity pain or symptoms.  His symptoms worsen specifically when he lays flat in bed.  It is disruptive to sleep as a result.  He also finds that sitting or assuming a flexed forward position while standing or walking to be helpful.  At baseline he is quite active, walking without assistive device.  However now he is requiring some cane assistance or walker assistance for longer distances.  This discomfort has been quite debilitating for him.  He denies acute bowel or bladder control issues, although he is an active evaluation and discussion with urology (Dr. Mcgarry) for BPH with obstruction.  He is reportedly scheduled next week for a urologic procedure to help relieve this.      He has not had any formal treatment for his low back or lower extremity pain.  He has taken as needed Tylenol and ibuprofen.  He was prescribed a steroid taper pack, which she found minimally helpful.    Prior treatments:  As needed Tylenol, ibuprofen, steroid taper pack    Relevant PMH/PSH for spine  BPH, procedure scheduled next week with urology    Past Medical History:   Diagnosis Date    BPH (benign prostatic hyperplasia)     Cervical radiculopathy     Chronic back pain     just completed steriod taper, takes tylenol or Motrin for pain as needed,  walks with cane    Delayed emergence from general anesthesia     Hemorrhoids     HLD (hyperlipidemia)     [place] (hard of hearing)     wears hearing aids    Hypothyroidism     TSH=4.23 12/7/23    Impotence     Long Q-T syndrome     Lower urinary tract symptoms (LUTS)     Personal history of other diseases of the digestive system 01/02/2018    History of diverticulosis    Prostate CA (Multi)     Sick sinus syndrome (Multi) 01/02/2018    Sick sinus syndrome, has a pacemaker    Syncope     pre-pacer, no issues since pacemaker    Tinnitus     Tremor     Bilateral hands, left worse than right    Urinary retention     St Caths self 2-3x/day    Vitamin D deficiency        Past Surgical History:   Procedure Laterality Date    CARDIAC PACEMAKER PLACEMENT  02/11/2015    Pacemaker Placement    COLONOSCOPY  02/11/2015    Complete Colonoscopy    HERNIA REPAIR  02/11/2015    Hernia Repair    OTHER SURGICAL HISTORY  01/02/2018    Needle Biopsy Of Prostate       Social History     Socioeconomic History    Marital status:    Tobacco Use    Smoking status: Never    Smokeless tobacco: Never   Vaping Use    Vaping status: Never Used   Substance and Sexual Activity    Alcohol use: Not Currently     Comment: 1 beer every few weeks    Drug use: Never    Sexual activity: Defer       Family History   Problem Relation Name Age of Onset    Alzheimer's disease Mother      Prostate cancer Father      Prostate cancer Brother      Alzheimer's disease Maternal Grandmother      Alzheimer's disease Mother's Sister         No Known Allergies    Current Outpatient Medications   Medication Instructions    acetaminophen (TYLENOL 8 HOUR) 650 mg, Every 8 hours PRN    alfuzosin (UROXATRAL) 10 mg, oral, Daily    bicalutamide (CASODEX) 50 mg, oral, Daily    cholecalciferol (Vitamin D-3) 50 mcg (2,000 unit) capsule 1 capsule, Daily    finasteride (PROSCAR) 5 mg, oral, Daily    fish oil concentrate (Omega-3) 120-180 mg capsule Take 1 capsule (1 g) by mouth  every other day.    gabapentin (NEURONTIN) 300 mg, oral, 2 times daily PRN    ibuprofen 400 mg, Every 6 hours PRN    LYCOPENE ORAL once daily.    magnesium oxide 500 mg magnesium tablet 1 tablet, Every other day    meloxicam (MOBIC) 15 mg, oral, Daily    simvastatin (Zocor) 40 mg tablet TAKE 1 TABLET BY MOUTH EVERY DAY         Vitals & Measurements  There were no vitals filed for this visit.     Ortho Exam  General: AO x 3, NAD  Cardio: examined extremities perfused by peripheral palpation  Resp: breathing unlabored  Gait: slow, careful gait. Assumes flexed forward lumbar posture.     Lower Extremity  Right  Left  IP   4/5  5/5  Quadriceps  4/5  5/5  Tibialis anterior  5/5  5/5  EHL   5/5  5/5  Gastrocnemius  5/5  5/5    Sensation: Normal to light touch throughout lower extremities bilaterally.    Reflexes:  Right   Left  Q  1+  2+  A   2+   2+    Clonus: positive sustained 6-7 beats on left  Straight leg raise: positive on right      Diagnostic Results - Imaging    XR lumbar spine, 11/26/24  FINDINGS:  Advanced spondylosis throughout the visualized lumbar spine. Grade 1  anterolisthesis L5 on S1 without significant change on flexion or  extension views. Moderate atherosclerotic calcifications of the aorta  and iliac vasculature.      IMPRESSION:  Advanced lumbar spondylosis. Grade 1 anterolisthesis L5 on S1. No  change on flexion or extension views.        Diagnosis  Encounter Diagnoses   Name Primary?    Lumbar radiculopathy     Acute right-sided low back pain with right-sided sciatica Yes    Lumbar spondylosis           Assessment/Plan  Mr. Fraga is a pleasant 89-year-old male who presents for initial evaluation of acute right-sided low back and right lower extremity pain.  There was no inciting event or injury, the symptoms have been ongoing for the last 1-1/2 weeks.  He has no red flag symptoms or signs.  He does have chronic urinary obstruction secondary to BPH for which he has a urologic procedure scheduled  next week.  His XR imaging demonstrates advanced lumbar spondylosis with L5-S1 spondylolisthesis.  He has only been taking as needed Tylenol and ibuprofen over-the-counter at home.  He has not had any formal treatment yet.    We had an extensive discussion with the patient, his wife, and his daughter in the office today about his symptoms, his imaging findings, and possible management options.  We discussed how he is likely experiencing some element of right sided lumbar radiculopathy secondary to his advanced multilevel lumbar spondylosis and chronic degenerative changes.  We discussed initiating some treatment for this in the form of a referral to physical therapy for a low back and lower extremity stretching and strengthening program.  I also prescribed meloxicam 15 mg/day to be taken on an as-needed basis for his pain.  We discussed not taking this with other NSAIDs and the small risk of renal or gastric issues.  I did advise him that he would need to discuss with the urology team about when to stop taking any NSAIDs prior to his procedure in order to minimize the risk of bleeding.  They are seeing urology later today.    He will follow-up with my office in approximately 8 weeks after completing his physical therapy program.  If his symptoms fail to improve or worsen, we can proceed with an MRI of the lumbar spine to evaluate for any focus of stenosis for more targeted treatment.    After our discussion, the patient articulated understanding of the plan and felt that all questions had been answered satisfactorily. The patient was pleased with the visit and very appreciative for the care rendered.    **Please excuse any errors in grammar or translation related to this dictation. Voice recognition software was utilized to prepare this document. **    F/u 8 weeks. No XRs.       Addendum 11:08 AM: patient's urologist clarified no NSAIDs prior to procedure. We agreed to prescribe a short (14-day) course of gabapentin  300 mg BID PRN for pain. Discussed sedating nature of medication.       Orders Placed This Encounter    Referral to Physical Therapy    meloxicam (Mobic) 15 mg tablet    gabapentin (Neurontin) 300 mg capsule       --    Danyel Manzano MD  Orthopaedic Spine Surgery  , Department of Orthopaedic Surgery  [facility]

## 2024-12-06 NOTE — PREPROCEDURE INSTRUCTIONS
Medication List            Accurate as of December 6, 2024  9:28 AM. Always use your most recent med list.                acetaminophen 650 mg ER tablet  Commonly known as: Tylenol 8 HOUR  Medication Adjustments for Surgery: Take on the morning of surgery  Notes to patient: If needed     alfuzosin 10 mg 24 hr tablet  Commonly known as: Uroxatral  Take 1 tablet (10 mg) by mouth once daily.  Medication Adjustments for Surgery: Take/Use as prescribed     bicalutamide 50 mg tablet  Commonly known as: Casodex  Take 1 tablet (50 mg total) by mouth once daily.  Medication Adjustments for Surgery: Take on the morning of surgery     cholecalciferol 50 mcg (2,000 unit) capsule  Commonly known as: Vitamin D-3  Medication Adjustments for Surgery: Do Not take on the morning of surgery     finasteride 5 mg tablet  Commonly known as: Proscar  Take 1 tablet (5 mg) by mouth once daily.  Medication Adjustments for Surgery: Take on the morning of surgery     fish oil concentrate 120-180 mg capsule  Commonly known as: Omega-3  Additional Medication Adjustments for Surgery: Take last dose 7 days before surgery     ibuprofen 200 mg tablet  Additional Medication Adjustments for Surgery: Take last dose 7 days before surgery     LYCOPENE ORAL  Additional Medication Adjustments for Surgery: Take last dose 7 days before surgery     magnesium oxide 500 mg magnesium tablet  Medication Adjustments for Surgery: Do Not take on the morning of surgery     meloxicam 15 mg tablet  Commonly known as: Mobic  Take 1 tablet (15 mg) by mouth once daily for 21 days.  Additional Medication Adjustments for Surgery: Take last dose 7 days before surgery     simvastatin 40 mg tablet  Commonly known as: Zocor  TAKE 1 TABLET BY MOUTH EVERY DAY  Medication Adjustments for Surgery: Take on the morning of surgery                            **Concerning above medication instructions, if medication is normally taken at night, continue normal schedule.**  **DO NOT  TAKE NIGHT PRIOR AND MORNING OF SURGERY**    CONTACT SURGEON'S OFFICE IF YOU DEVELOP:  * Fever = 100.4 F   * New respiratory symptoms (e.g. cough, shortness of breath, respiratory distress, sore throat)  * Recent loss of taste or smell  *Flu like symptoms such as headache, fatigue or gastrointestinal symptoms  * You develop any open sores, shingles, burning or painful urination   AND/OR:  * You no longer wish to have the surgery.  * Any other personal circumstances change that may lead to the need to cancel or defer this surgery.  *You were admitted to any hospital within one week of your planned procedure.    SMOKING:  *Quitting smoking can make a huge difference to your health and recovery from surgery.    *If you need help with quitting, call 5-254-QUIT-NOW.    THE DAY OF SURGERY:  *Do not eat any food after midnight the night before surgery.   *You must drink 13.5 ounces of clear liquids (i.e. water, black coffee (no milk or cream), tea, apple juice or electrolyte drinks (gatorade)) 2 hours before your arrival time.  *You may chew gum until 2 hours before your surgery    SURGICAL TIME  *You will be contacted between 2 p.m. and 6 p.m. the business day before your surgery with your arrival time.  *If you haven't received a call by 6pm, call [phone].  *Scheduled surgery times may change and you will be notified if this occurs-check your personal voicemail for any updates.    ON THE MORNING OF SURGERY:  *Wear comfortable, loose fitting clothing.   *Do not use moisturizers, creams, lotions or perfume.  *All jewelry and valuables should be left at home.  *Prosthetic devices such as contact lenses, hearing aids, dentures, eyelash extensions, hairpins and body piercing must be removed before surgery.    BRING WITH YOU:  *Photo ID and insurance card  *Current list of medicines and allergies  *Pacemaker/Defibrillator/Heart stent cards  *CPAP machine and mask  *Slings/splints/crutches  *Copy of your complete Advanced  Directive/DHPOA-if applicable  *Neurostimulator implant remote    PARKING AND ARRIVAL:  *Check in at the Main Entrance desk and let them know you are here for surgery.  *You will be directed to the 2nd floor surgical waiting area.    AFTER OUTPATIENT SURGERY:  *A responsible adult MUST accompany you at the time of discharge and stay with you for 24 hours after your surgery.  *You may NOT drive yourself home after surgery.  *You may use a taxi or ride sharing service ([email or web address], Uber) to return home ONLY if you are accompanied by a friend or family member.  *Instructions for resuming your medications will be provided by your surgeon.

## 2024-12-08 LAB — BACTERIA UR CULT: NORMAL

## 2024-12-11 ENCOUNTER — APPOINTMENT (OUTPATIENT)
Dept: GERIATRIC MEDICINE | Facility: CLINIC | Age: 89
End: 2024-12-11
Payer: MEDICARE

## 2024-12-12 ENCOUNTER — ANESTHESIA EVENT (OUTPATIENT)
Dept: OPERATING ROOM | Facility: HOSPITAL | Age: 89
End: 2024-12-12
Payer: MEDICARE

## 2024-12-12 ENCOUNTER — APPOINTMENT (OUTPATIENT)
Dept: GERIATRIC MEDICINE | Facility: CLINIC | Age: 89
End: 2024-12-12
Payer: MEDICARE

## 2024-12-13 ENCOUNTER — HOSPITAL ENCOUNTER (OUTPATIENT)
Facility: HOSPITAL | Age: 89
Setting detail: OUTPATIENT SURGERY
Discharge: HOME | End: 2024-12-13
Attending: UROLOGY | Admitting: UROLOGY
Payer: MEDICARE

## 2024-12-13 ENCOUNTER — ANESTHESIA (OUTPATIENT)
Dept: OPERATING ROOM | Facility: HOSPITAL | Age: 89
End: 2024-12-13
Payer: MEDICARE

## 2024-12-13 VITALS
HEART RATE: 73 BPM | HEIGHT: 73 IN | OXYGEN SATURATION: 96 % | TEMPERATURE: 97.5 F | SYSTOLIC BLOOD PRESSURE: 140 MMHG | DIASTOLIC BLOOD PRESSURE: 85 MMHG | RESPIRATION RATE: 14 BRPM | WEIGHT: 162.26 LBS | BODY MASS INDEX: 21.5 KG/M2

## 2024-12-13 DIAGNOSIS — N40.1 BENIGN PROSTATIC HYPERPLASIA WITH URINARY RETENTION: Primary | ICD-10-CM

## 2024-12-13 DIAGNOSIS — N13.8 BPH WITH OBSTRUCTION/LOWER URINARY TRACT SYMPTOMS: ICD-10-CM

## 2024-12-13 DIAGNOSIS — N40.1 BPH WITH OBSTRUCTION/LOWER URINARY TRACT SYMPTOMS: ICD-10-CM

## 2024-12-13 DIAGNOSIS — R33.8 BENIGN PROSTATIC HYPERPLASIA WITH URINARY RETENTION: Primary | ICD-10-CM

## 2024-12-13 PROCEDURE — 3700000001 HC GENERAL ANESTHESIA TIME - INITIAL BASE CHARGE: Performed by: UROLOGY

## 2024-12-13 PROCEDURE — 7100000010 HC PHASE TWO TIME - EACH INCREMENTAL 1 MINUTE: Performed by: UROLOGY

## 2024-12-13 PROCEDURE — 2500000004 HC RX 250 GENERAL PHARMACY W/ HCPCS (ALT 636 FOR OP/ED)

## 2024-12-13 PROCEDURE — 2720000007 HC OR 272 NO HCPCS: Performed by: UROLOGY

## 2024-12-13 PROCEDURE — 7100000009 HC PHASE TWO TIME - INITIAL BASE CHARGE: Performed by: UROLOGY

## 2024-12-13 PROCEDURE — 3600000004 HC OR TIME - INITIAL BASE CHARGE - PROCEDURE LEVEL FOUR: Performed by: UROLOGY

## 2024-12-13 PROCEDURE — 88307 TISSUE EXAM BY PATHOLOGIST: CPT | Mod: TC,AHULAB | Performed by: UROLOGY

## 2024-12-13 PROCEDURE — C1889 IMPLANT/INSERT DEVICE, NOC: HCPCS | Performed by: UROLOGY

## 2024-12-13 PROCEDURE — 7100000001 HC RECOVERY ROOM TIME - INITIAL BASE CHARGE: Performed by: UROLOGY

## 2024-12-13 PROCEDURE — 3700000002 HC GENERAL ANESTHESIA TIME - EACH INCREMENTAL 1 MINUTE: Performed by: UROLOGY

## 2024-12-13 PROCEDURE — 3600000009 HC OR TIME - EACH INCREMENTAL 1 MINUTE - PROCEDURE LEVEL FOUR: Performed by: UROLOGY

## 2024-12-13 PROCEDURE — 2500000001 HC RX 250 WO HCPCS SELF ADMINISTERED DRUGS (ALT 637 FOR MEDICARE OP): Performed by: ANESTHESIOLOGY

## 2024-12-13 PROCEDURE — 52649 PROSTATE LASER ENUCLEATION: CPT | Performed by: UROLOGY

## 2024-12-13 PROCEDURE — 88307 TISSUE EXAM BY PATHOLOGIST: CPT | Performed by: PATHOLOGY

## 2024-12-13 PROCEDURE — 7100000002 HC RECOVERY ROOM TIME - EACH INCREMENTAL 1 MINUTE: Performed by: UROLOGY

## 2024-12-13 PROCEDURE — 2500000005 HC RX 250 GENERAL PHARMACY W/O HCPCS: Performed by: UROLOGY

## 2024-12-13 RX ORDER — FENTANYL CITRATE 50 UG/ML
INJECTION, SOLUTION INTRAMUSCULAR; INTRAVENOUS AS NEEDED
Status: DISCONTINUED | OUTPATIENT
Start: 2024-12-13 | End: 2024-12-13

## 2024-12-13 RX ORDER — AMOXICILLIN AND CLAVULANATE POTASSIUM 875; 125 MG/1; MG/1
875 TABLET, FILM COATED ORAL 2 TIMES DAILY
Qty: 10 TABLET | Refills: 0 | Status: SHIPPED | OUTPATIENT
Start: 2024-12-13 | End: 2024-12-18

## 2024-12-13 RX ORDER — LIDOCAINE HYDROCHLORIDE 10 MG/ML
0.1 INJECTION, SOLUTION EPIDURAL; INFILTRATION; INTRACAUDAL; PERINEURAL ONCE
Status: DISCONTINUED | OUTPATIENT
Start: 2024-12-13 | End: 2024-12-13 | Stop reason: HOSPADM

## 2024-12-13 RX ORDER — PHENAZOPYRIDINE HYDROCHLORIDE 200 MG/1
200 TABLET, FILM COATED ORAL 3 TIMES DAILY PRN
Qty: 10 TABLET | Refills: 0 | Status: SHIPPED | OUTPATIENT
Start: 2024-12-13

## 2024-12-13 RX ORDER — SULFAMETHOXAZOLE AND TRIMETHOPRIM 800; 160 MG/1; MG/1
1 TABLET ORAL 2 TIMES DAILY
Qty: 10 TABLET | Refills: 0 | Status: SHIPPED | OUTPATIENT
Start: 2024-12-13 | End: 2024-12-18

## 2024-12-13 RX ORDER — PHENYLEPHRINE HCL IN 0.9% NACL 1 MG/10 ML
SYRINGE (ML) INTRAVENOUS AS NEEDED
Status: DISCONTINUED | OUTPATIENT
Start: 2024-12-13 | End: 2024-12-13

## 2024-12-13 RX ORDER — SODIUM CHLORIDE, SODIUM LACTATE, POTASSIUM CHLORIDE, CALCIUM CHLORIDE 600; 310; 30; 20 MG/100ML; MG/100ML; MG/100ML; MG/100ML
100 INJECTION, SOLUTION INTRAVENOUS CONTINUOUS
Status: ACTIVE | OUTPATIENT
Start: 2024-12-13 | End: 2024-12-13

## 2024-12-13 RX ORDER — CEFAZOLIN SODIUM 2 G/100ML
2 INJECTION, SOLUTION INTRAVENOUS ONCE
Status: DISCONTINUED | OUTPATIENT
Start: 2024-12-13 | End: 2024-12-13 | Stop reason: HOSPADM

## 2024-12-13 RX ORDER — OXYCODONE HYDROCHLORIDE 5 MG/1
10 TABLET ORAL EVERY 4 HOURS PRN
Status: DISCONTINUED | OUTPATIENT
Start: 2024-12-13 | End: 2024-12-13 | Stop reason: HOSPADM

## 2024-12-13 RX ORDER — SODIUM CHLORIDE 0.9 G/100ML
IRRIGANT IRRIGATION AS NEEDED
Status: DISCONTINUED | OUTPATIENT
Start: 2024-12-13 | End: 2024-12-13 | Stop reason: HOSPADM

## 2024-12-13 RX ORDER — OXYCODONE HYDROCHLORIDE 5 MG/1
5 TABLET ORAL EVERY 4 HOURS PRN
Status: DISCONTINUED | OUTPATIENT
Start: 2024-12-13 | End: 2024-12-13 | Stop reason: HOSPADM

## 2024-12-13 RX ORDER — GENTAMICIN SULFATE 60 MG/50ML
120 INJECTION, SOLUTION INTRAVENOUS ONCE
Status: DISCONTINUED | OUTPATIENT
Start: 2024-12-13 | End: 2024-12-13 | Stop reason: HOSPADM

## 2024-12-13 RX ORDER — CEFAZOLIN 1 G/1
INJECTION, POWDER, FOR SOLUTION INTRAVENOUS AS NEEDED
Status: DISCONTINUED | OUTPATIENT
Start: 2024-12-13 | End: 2024-12-13

## 2024-12-13 RX ORDER — SODIUM CHLORIDE, SODIUM LACTATE, POTASSIUM CHLORIDE, CALCIUM CHLORIDE 600; 310; 30; 20 MG/100ML; MG/100ML; MG/100ML; MG/100ML
INJECTION, SOLUTION INTRAVENOUS CONTINUOUS PRN
Status: DISCONTINUED | OUTPATIENT
Start: 2024-12-13 | End: 2024-12-13

## 2024-12-13 RX ORDER — POLYETHYLENE GLYCOL 3350 17 G/17G
17 POWDER, FOR SOLUTION ORAL DAILY
Qty: 10 PACKET | Refills: 0 | Status: SHIPPED | OUTPATIENT
Start: 2024-12-13 | End: 2024-12-23

## 2024-12-13 RX ORDER — PROPOFOL 10 MG/ML
INJECTION, EMULSION INTRAVENOUS CONTINUOUS PRN
Status: DISCONTINUED | OUTPATIENT
Start: 2024-12-13 | End: 2024-12-13

## 2024-12-13 RX ORDER — GENTAMICIN 40 MG/ML
INJECTION, SOLUTION INTRAMUSCULAR; INTRAVENOUS AS NEEDED
Status: DISCONTINUED | OUTPATIENT
Start: 2024-12-13 | End: 2024-12-13

## 2024-12-13 RX ORDER — LIDOCAINE HYDROCHLORIDE 20 MG/ML
INJECTION, SOLUTION EPIDURAL; INFILTRATION; INTRACAUDAL; PERINEURAL AS NEEDED
Status: DISCONTINUED | OUTPATIENT
Start: 2024-12-13 | End: 2024-12-13

## 2024-12-13 RX ORDER — OXYCODONE HYDROCHLORIDE 5 MG/1
5 TABLET ORAL EVERY 6 HOURS PRN
Qty: 5 TABLET | Refills: 0 | Status: SHIPPED | OUTPATIENT
Start: 2024-12-13

## 2024-12-13 SDOH — HEALTH STABILITY: MENTAL HEALTH: CURRENT SMOKER: 0

## 2024-12-13 ASSESSMENT — PAIN - FUNCTIONAL ASSESSMENT
PAIN_FUNCTIONAL_ASSESSMENT: 0-10
PAIN_FUNCTIONAL_ASSESSMENT: UNABLE TO SELF-REPORT
PAIN_FUNCTIONAL_ASSESSMENT: 0-10

## 2024-12-13 ASSESSMENT — PAIN SCALES - GENERAL
PAINLEVEL_OUTOF10: 4
PAINLEVEL_OUTOF10: 5 - MODERATE PAIN
PAINLEVEL_OUTOF10: 0 - NO PAIN
PAINLEVEL_OUTOF10: 4
PAINLEVEL_OUTOF10: 0 - NO PAIN
PAINLEVEL_OUTOF10: 3
PAINLEVEL_OUTOF10: 5 - MODERATE PAIN

## 2024-12-13 ASSESSMENT — PAIN DESCRIPTION - DESCRIPTORS
DESCRIPTORS: ACHING

## 2024-12-13 ASSESSMENT — COLUMBIA-SUICIDE SEVERITY RATING SCALE - C-SSRS
1. IN THE PAST MONTH, HAVE YOU WISHED YOU WERE DEAD OR WISHED YOU COULD GO TO SLEEP AND NOT WAKE UP?: NO
2. HAVE YOU ACTUALLY HAD ANY THOUGHTS OF KILLING YOURSELF?: NO
6. HAVE YOU EVER DONE ANYTHING, STARTED TO DO ANYTHING, OR PREPARED TO DO ANYTHING TO END YOUR LIFE?: NO

## 2024-12-13 NOTE — DISCHARGE INSTRUCTIONS
DEPARTMENT OF UROLOGY  DISCHARGE INSTRUCTIONS [facility]  Outpatient Surgery    C O N F I D E N T I A L   I N F O R M A T I O N    Yayo Fraga Jr.        Call [phone] during regular daytime business hours (8:00 am - 5:00 pm) and after 5:00 pm and ask for the Urology resident with any questions or concerns.      If it is a life-threatening situation, proceed to the nearest emergency department.        Thank you for the opportunity to care for you today.  Your health and healing are very important to us.  We hope we made you feel as comfortable as possible and are committed to your recovery and continued well-being.      The following is a brief overview of your transurethral prostate resection today. Some of the information contained on this summary may be confidential.  This information should be kept in your records and should be shared with your regular doctor.    Physicians:   Dr. Mcgarry    Procedure performed: Prostate Resection  Pending results:   pathology of tissue taken from your prostate  Catheter removal appointment: 1-3 days after surgery    What to Expect During your Recovery and Home Care  Anesthesia Side Effects   You received anesthesia today.  You may feel sleepy, tired, or have a sore throat.   You may also feel drowsiness, dizziness, or inability to think clearly.  For your safety, do not drive, drink alcoholic beverages, take any unprescribed medication or make any important decisions for 24 hours.  A responsible adult should be with you for 24 hours.        Activity and Recovery    No heavy lifting over ten pounds. Limit activity while urinary catheter is in place. Avoid activities that would cause pulling or tugging on your catheter.    Do not drive or operate heavy machinery while taking narcotic pain medications as these medications can alter perception, impair judgement, and slow reaction times.    Pain Control  Unfortunately, you may experience pain after your procedure.  Adequate management  can include alternative measures to help ease your pain and can include over the counter Tylenol. Pyridium (phenazopyridine) will help with the burning sensation that men experience after HOLEP. If tylenol and pyridium do not control pain adequately, oxycodone (narcotic) can be taken on an as needed basis for breakthrough pain.     Do not take more than 4,000mg of Tylenol in a 24-hour period.      Nausea/Vomiting   Clear liquids are best tolerated at first. Start slow, advance your diet as tolerated to normal foods. Avoid spicy, greasy, heavy foods at first. Also, you may feel nauseous or like you need to vomit if you take any type of medication on an empty stomach.  Call your physician if you are unable to eat or drink and have persistent vomiting.    Signs of Bleeding   You are going to have some blood in your urine. Your urine will be light pink to yellow. You always want to look at the urine in the tubing of your catheter and not in the large urine collection bag to check for bleeding. If urine becomes thick dark albert red, has large clots or stops draining, please notify your physician.    Wait until two days after your surgery to resume your blood thinner medication.    Treatment/wound care:   Keep area(s) clean and dry. Clean around the tip or your penis were the catheter goes in daily with mild soap and water.  It is okay to shower after surgery.    Do not submerge your catheter in standing water until seen for follow up appointment (no tub bathing, swimming, or hot tubs).      Signs of Infection  Signs of infection can include fever, drainage(green/yellow), chills, burning sensation with passing of urine, catheter leakage, or severe abdominal pain.  If you see any of these occur, please contact your doctor's office at [phone].  Any fever higher than 100.4, especially if associated with an ill feeling, abdominal pain, chills, or nausea should be reported to your surgeon.      Assist in bowel  movements/urination  Increase fiber in diet  Increase water (6 to 8 glasses)  Increase walking   Treat constipation as needed (recommend Miralax for postoperative constipation)    Additional Instructions: CATHETER CARE  Always keep the catheters tubing and drainage bag below the level of your bladder.  Avoid loops and kinks in the catheter tubing.  NOTIFY your physician if catheter falls out or catheter seems clogged and urine is not draining.   Do not wear the small leg bag to bed you should be provided with a larger overnight bag that you should wear to bed and can hang over the side of the bed.  We recommend wearing the large bag in the shower, as this is easy to dry, and you do not get your leg straps wet from your leg bag.   Your catheter should be secured to your upper thigh, do not allow it to hang or dangle.  Your catheter will be removed at your post-operative appointment.

## 2024-12-13 NOTE — PERIOPERATIVE NURSING NOTE
1204- PHASE I - Patient to PACU bay at this time in stable condition. Bedside monitors applied to patient upon arrival to PACU. Report received from OR Team at bedside.     1219- Updated patient's wife at this time via text message update.     1253- Medicated patient at this time per orders for pain rating of 5/10 at this time. Verified allergies prior to admin    1315- PHASE II

## 2024-12-13 NOTE — ANESTHESIA PROCEDURE NOTES
Airway  Date/Time: 12/13/2024 10:51 AM  Urgency: elective    Airway not difficult    Staffing  Performed: [facility]   Authorized by: Lila Ortega MD    Performed by: TABITHA Walsh  Patient location during procedure: OR    Indications and Patient Condition  Indications for airway management: anesthesia  Spontaneous Ventilation: absent  Sedation level: deep  Preoxygenated: yes  Mask difficulty assessment: 1 - vent by mask    Final Airway Details  Final airway type: supraglottic airway      Successful airway: Supraglottic airway: igel.  Size 4     Number of attempts at approach: 1

## 2024-12-13 NOTE — ANESTHESIA PREPROCEDURE EVALUATION
Patient: Yayo Fraga Jr.    Procedure Information       Date/Time: 12/13/24 1030    Procedure: [place] Laser Enucleation Transurethral Prostate (Prostate)    Location: [facility] OR 09 / [facility] OR    Surgeons: Jorge Mcgarry MD          Vitals:    12/13/24 0930   BP: 134/76   Pulse: 72   Resp: 15   Temp: 36.2 °C (97.2 °F)   SpO2: 99%       Past Surgical History:   Procedure Laterality Date   • CARDIAC PACEMAKER PLACEMENT  02/11/2015    Pacemaker Placement   • COLONOSCOPY  02/11/2015    Complete Colonoscopy   • HERNIA REPAIR  02/11/2015    Hernia Repair   • OTHER SURGICAL HISTORY  01/02/2018    Needle Biopsy Of Prostate     Past Medical History:   Diagnosis Date   • BPH (benign prostatic hyperplasia)    • Cervical radiculopathy    • Chronic back pain     just completed steriod taper, takes tylenol or Motrin for pain as needed, walks with cane   • Delayed emergence from general anesthesia    • Hemorrhoids    • HLD (hyperlipidemia)    • [place] (hard of hearing)     wears hearing aids   • Hypothyroidism     TSH=4.23 12/7/23   • Impotence    • Long Q-T syndrome    • Lower urinary tract symptoms (LUTS)    • Personal history of other diseases of the digestive system 01/02/2018    History of diverticulosis   • Prostate CA (Multi)    • Sick sinus syndrome (Multi) 01/02/2018    Sick sinus syndrome, has a pacemaker   • Syncope     pre-pacer, no issues since pacemaker   • Tinnitus    • Tremor     Bilateral hands, left worse than right   • Urinary retention     St Caths self 2-3x/day   • Vitamin D deficiency        Current Facility-Administered Medications:   •  ceFAZolin (Ancef) 2 g in dextrose (iso)  mL, 2 g, intravenous, Once, Jorge Mcgarry MD  •  gentamicin (Garamycin) 120 mg in sodium chloride (iso)  mL, 120 mg, intravenous, Once, Jorge Mcgarry MD  Prior to Admission medications    Medication Sig Start Date End Date Taking? Authorizing Provider   acetaminophen (Tylenol 8 HOUR) 650 mg ER tablet Take 1  tablet (650 mg) by mouth every 8 hours if needed for mild pain (1 - 3). Do not crush, chew, or split.   Yes Historical Provider, MD   alfuzosin (Uroxatral) 10 mg 24 hr tablet Take 1 tablet (10 mg) by mouth once daily. 4/11/24  Yes ISELA Beth   bicalutamide (Casodex) 50 mg tablet Take 1 tablet (50 mg total) by mouth once daily. 12/6/24  Yes Burt Arroyo MD   cholecalciferol (Vitamin D-3) 50 mcg (2,000 unit) capsule Take 1 capsule (50 mcg) by mouth once daily.   Yes Historical Provider, MD   finasteride (Proscar) 5 mg tablet Take 1 tablet (5 mg) by mouth once daily. 11/24/23  Yes ISELA Beth   fish oil concentrate (Omega-3) 120-180 mg capsule Take 1 capsule (1 g) by mouth every other day. 6/2/23  Yes Historical Provider, MD   gabapentin (Neurontin) 300 mg capsule Take 1 capsule (300 mg) by mouth 2 times a day as needed (Nerve pain) for up to 14 days. 12/6/24 12/20/24 Yes Danyel Manzano MD   ibuprofen 200 mg tablet Take 2 tablets (400 mg) by mouth every 6 hours if needed for mild pain (1 - 3).   Yes Historical Provider, MD   LYCOPENE ORAL once daily. 12/2/22  Yes Historical Provider, MD   magnesium oxide 500 mg magnesium tablet Take 1 tablet (500 mg) by mouth every other day.   Yes Historical Provider, MD   meloxicam (Mobic) 15 mg tablet Take 1 tablet (15 mg) by mouth once daily for 21 days. 12/6/24 12/27/24 Yes Danyel Manzano MD   simvastatin (Zocor) 40 mg tablet TAKE 1 TABLET BY MOUTH EVERY DAY 6/12/24  Yes Can Feldman MD   oxyCODONE (Roxicodone) 5 mg immediate release tablet Take 1 tablet (5 mg) by mouth every 6 hours if needed for severe pain (7 - 10). 12/13/24   Tim Banegas MD   phenazopyridine (Pyridium) 200 mg tablet Take 1 tablet (200 mg) by mouth 3 times a day as needed for bladder spasms. 12/13/24   Tim Banegas MD   polyethylene glycol (Glycolax, Miralax) 17 gram packet Take 17 g by mouth once daily for 10 days. 12/13/24 12/23/24  Tim Banegas MD    sulfamethoxazole-trimethoprim (Bactrim DS) 800-160 mg tablet Take 1 tablet by mouth 2 times a day for 5 days. 12/13/24 12/18/24  Tim Banegas MD     No Known Allergies  Social History     Tobacco Use   • Smoking status: Never   • Smokeless tobacco: Never   Substance Use Topics   • Alcohol use: Not Currently     Comment: 1 beer every few weeks         Chemistry    Lab Results   Component Value Date/Time     12/06/2024 1015    K 4.2 12/06/2024 1015     12/06/2024 1015    CO2 28 12/06/2024 1015    BUN 25 (H) 12/06/2024 1015    CREATININE 1.11 12/06/2024 1015    Lab Results   Component Value Date/Time    CALCIUM 9.0 12/06/2024 1015    ALKPHOS 52 11/11/2024 0831    AST 17 11/11/2024 0831    ALT 9 (L) 11/11/2024 0831    BILITOT 1.1 11/11/2024 0831          Lab Results   Component Value Date/Time    WBC 10.9 12/06/2024 1015    HGB 14.1 12/06/2024 1015    HCT 42.7 12/06/2024 1015     12/06/2024 1015     Lab Results   Component Value Date/Time    PROTIME 11.6 11/19/2020 0819    INR 1.0 11/19/2020 0819     Encounter Date: 12/06/24   ECG 12 lead (Clinic Performed)   Result Value    Ventricular Rate 74    Atrial Rate 83    QRS Duration 74    QT Interval 414    QTC Calculation(Bazett) 459    R Axis 16    T Axis 46    QRS Count 12    Q Onset 230    T Offset 437    QTC Fredericia 444    Narrative    Electronic atrial pacemaker  When compared with ECG of 15-JUL-2019 10:39,  Electronic atrial pacemaker has replaced Ectopic atrial rhythm  QRS duration has decreased  Confirmed by Judah Jansen ([id number]) on 12/6/2024 1:51:49 PM     No results found for this or any previous visit from the past 1095 days.        Relevant Problems   Cardiac   (+) Cardiac pacemaker in situ   (+) Long Q-T syndrome   (+) Mixed hyperlipidemia   (+) Prolonged QT interval   (+) Sick sinus syndrome (Multi)      Neuro   (+) Cervical radiculopathy      /Renal   (+) Adenocarcinoma of prostate (Multi)   (+) BPH with obstruction/lower urinary tract  "symptoms   (+) Benign prostatic hyperplasia with urinary retention      Endocrine   (+) Subclinical hypothyroidism      HEENT   (+) Left-sided sensorineural hearing loss   (+) Sensorineural hearing loss, bilateral       Clinical information reviewed:   Tobacco  Allergies  Meds   Med Hx  Surg Hx   Fam Hx  Soc Hx        NPO Detail:  NPO/Void Status  Date of Last Liquid: 12/13/24  Time of Last Liquid: 0800  Date of Last Solid: 12/12/24  Time of Last Solid: 2000  Last Intake Type: Clear fluids  Time of Last Void: 0600 (self cathed)         Physical Exam    Airway  Mallampati: I  TM distance: >3 FB  Neck ROM: full     Cardiovascular   Rhythm: regular  Rate: normal     Dental - normal exam     Pulmonary   Breath sounds clear to auscultation     Abdominal        Anesthesia Plan    History of general anesthesia?: yes  History of complications of general anesthesia?: no    ASA 3     general   (SSS s/p PM placement.  Long QT syndrome. Asymptomatic from cardiac standpoint on DOS.  Patient physically active until very recently developed a \"pinched nerve\".  Denies CP, SOB with physical activity. )  The patient is not a current smoker.    intravenous induction   Anesthetic plan and risks discussed with patient and spouse.    Plan discussed with [facility].  "

## 2024-12-13 NOTE — ANESTHESIA POSTPROCEDURE EVALUATION
Patient: Yayo Fraga Jr.    Procedure Summary       Date: 12/13/24 Room / Location: [facility] OR 09 / [facility] OR    Anesthesia Start: 1036 Anesthesia Stop: 1209    Procedure: [place] Laser Enucleation Transurethral Prostate (Prostate) Diagnosis:       Benign prostatic hyperplasia with urinary retention      (Benign prostatic hyperplasia with urinary retention [N40.1, R33.8])    Surgeons: Jorge Mcgarry MD Responsible Provider: Lila Ortega MD    Anesthesia Type: general ASA Status: 3            Anesthesia Type: general    Vitals Value Taken Time   /70 12/13/24 1301   Temp 36 °C (96.8 °F) 12/13/24 1204   Pulse 71 12/13/24 1313   Resp 14 12/13/24 1300   SpO2 96 % 12/13/24 1313   Vitals shown include unfiled device data.    Anesthesia Post Evaluation    Patient participation: complete - patient participated  Level of consciousness: awake and alert  Pain management: adequate  Airway patency: patent  Cardiovascular status: acceptable  Respiratory status: acceptable  Hydration status: acceptable  Postoperative Nausea and Vomiting: none    There were no known notable events for this encounter.

## 2024-12-13 NOTE — OP NOTE
[place] Laser Enucleation Transurethral Prostate Operative Note     Date: 2024  OR Location: [facility]    Name: Yayo Fraga Jr., : 1935, Age: 89 y.o., MRN: [id number], Sex: male    Diagnosis  Pre-op Diagnosis      * Benign prostatic hyperplasia with urinary retention [N40.1, R33.8] Post-op Diagnosis     * Benign prostatic hyperplasia with urinary retention [N40.1, R33.8]     Procedures  [place] Laser Enucleation Transurethral Prostate  36555 - DC LASER ENUCLEATION PROSTATE W/MORCELLATION      Surgeons      * Jorge Mcgarry - Primary    Resident/Fellow/Other Assistant:  Surgeons and Role:     * Tim Banegas MD - Resident - Assisting    Staff:   Circulator: Maryam Mendozaub Person: Shital Kaplan Circulator: Tova    Anesthesia Staff: Anesthesiologist: Lila Ortega MD  C-AA: TABITHA Orlando; TABITHA Walsh    Procedure Summary  Anesthesia: General  ASA: III  Estimated Blood Loss: 10mL  Intra-op Medications:   Administrations occurring from 1030 to 1230 on 24:   Medication Name Total Dose   sodium chloride 0.9 % irrigation solution 15,000 mL   ceFAZolin (Ancef) vial 1 g 2 g   dexAMETHasone (Decadron) injection 4 mg/mL 8 mg   fentaNYL (Sublimaze) injection 50 mcg/mL 50 mcg   gentamicin (Garamycin) injection 40 mg/mL 80 mg   lactated Ringer's infusion Cannot be calculated   lidocaine PF (Xylocaine-MPF) local injection 2 % 60 mg   phenylephrine 100 mcg/mL syringe 10 mL (prefilled) 600 mcg   propofol (Diprivan) injection 10 mg/mL 534.54 mg              Anesthesia Record               Intraprocedure I/O Totals          Output    Est. Blood Loss 25 mL    Total Output 25 mL          Specimen:   ID Type Source Tests Collected by Time   1 : prostate tissue Tissue PROSTATE HOLEP SURGICAL PATHOLOGY EXAM Jorge Mcgarry MD 2024 1147                 Drains and/or Catheters:   Urethral Catheter Non-latex 16 Fr. (Active)       Tourniquet Times:         Implants:     Findings: median  lobe-dominant, heavily trabeculated bladder    Indications: Yayo Fraga Jr. is an 89 y.o. male who is having surgery for Benign prostatic hyperplasia with urinary retention [N40.1, R33.8].     The patient was seen in the preoperative area. The risks, benefits, complications, treatment options, non-operative alternatives, expected recovery and outcomes were discussed with the patient. The possibilities of reaction to medication, pulmonary aspiration, injury to surrounding structures, bleeding, recurrent infection, the need for additional procedures, failure to diagnose a condition, and creating a complication requiring transfusion or operation were discussed with the patient. The patient concurred with the proposed plan, giving informed consent.  The site of surgery was properly noted/marked if necessary per policy. The patient has been actively warmed in preoperative area. Preoperative antibiotics have been ordered and given within 1 hours of incision. Venous thrombosis prophylaxis have been ordered including bilateral sequential compression devices    Procedure Details:   1. HOLMIUM LASER ENUCLEATION OF THE PROSTATE       Laser Settings Used:  Cutting 2 J, 40 Hz.  Coagulation 1.5 J, 30 Hz.   Tim or Virtual basket used? Tim  Preoperative Prostate Size:  approx 60 cubic centimeters.     Prostate configuration: trilobar  Complication: none  EBL: 10 ml  Catheter: 22Fr 3 way  Antibiotics: ancef, gent  Specimen: Morcellated prostatic tissue.    DESCRIPTION OF PROCEDURE:      The patient was brought to the OR and after good general anesthesia was achieved, the patient was prepped and draped in dorsal lithotomy position. All pressure points were padded.  Surgical pause was performed.  The patient was identified using 2 identifiers.  The correct surgical procedure was confirmed.  All members of surgical and anesthesia team were in agreement.  The patient received prophylactic antibiotic and the procedure  started.    Cystoscopy: The patient's bladder was first entered with a 22-Bruneian rigid cystoscope with 30-degree lens.  Cystoscopic examination showed normal anterior urethra.  The posterior urethra showed trilobar obstruction, median lobe dominant.  Both ureteral orifices were identified away from the bladder neck. The cystoscope was removed and the meatus was dilated up to 28-Bruneian using sounds.  The urethra was then filled with lidocaine gel and a 26-Bruneian Gupta continuous-flow resectoscope was placed.  The holmium laser apparatus was placed through the sheath.  Using a 550-micron end-firing quartz laser fiber, a laser bridge, and a 7-Bruneian laser stabilizing catheter, the procedure was started with the above-mentioned laser setting.    A en bloc technique was performed with an initial incision at the apex and circumferentially using low energy.  We continued to develop our anterior plane at the apex, identifying the capsule and freeing up the anterior apex well within the sphincter. We then proceeded with our posterior dissection, developing the posterior space toward the bladder neck and continuing our incision laterally to 9 and 3:00.    Then we turned our attention to the lateral attachments of the prostate.  Using laser energy, taking care to avoid any damage to the sphincter, we connected our previously dissected anterior and posterior planes to completely liberate the apex of the prostate preserving the sphincter. We then continued our dissection circumferentially, freeing the prostate systematically from apex to its attachments at the bladder neck. The adenoma was then pushed into the bladder.     Once the prostate had been fully enucleated, the laser was defocused on any sources of bleeding to get additional hemostasis.  There was good hemostasis at the conclusion of this procedure.  A few small remaining nodular adenomas were then resected from the capsule.      The laser bridge apparatus and the  resectoscope were then removed and the offset Gupta nephroscope and Gupta - Prianha tissue morcellator were then introduced and used to completely morcellate the tissue.  Two inflows were used during this portion of the procedure to fully distend the bladder and to prevent any inadvertent bladder injury.  The bladder was then ellik'd out after insertion of the inner sheath and reinspected with the cystoscope showing no residual adenomas.  Hemostasis was ensured at the conclusion of the procedure and both ureteral orifices were confirmed and identified well away from the area of resection.  No residual adenoma could be identified.     Following that, a three-way Ventura catheter on a guide was placed into the bladder and the balloon was filled.  The bladder was irrigated near clear and the continuous irrigation was started.     The patient tolerated the procedure well and was shifted to recovery in good general condition    Complications:  None; patient tolerated the procedure well.    Disposition: PACU - hemodynamically stable.  Condition: stable                 Additional Details:     Attending Attestation: I was present for the entire procedure.    Jorge Mcgarry  Phone Number: [phone]

## 2024-12-16 ENCOUNTER — OFFICE VISIT (OUTPATIENT)
Dept: UROLOGY | Facility: HOSPITAL | Age: 89
End: 2024-12-16
Payer: MEDICARE

## 2024-12-16 VITALS
OXYGEN SATURATION: 96 % | DIASTOLIC BLOOD PRESSURE: 61 MMHG | RESPIRATION RATE: 20 BRPM | SYSTOLIC BLOOD PRESSURE: 121 MMHG | HEART RATE: 74 BPM | TEMPERATURE: 97.2 F

## 2024-12-16 DIAGNOSIS — N40.1 BENIGN PROSTATIC HYPERPLASIA WITH URINARY RETENTION: Primary | ICD-10-CM

## 2024-12-16 DIAGNOSIS — R33.8 BENIGN PROSTATIC HYPERPLASIA WITH URINARY RETENTION: Primary | ICD-10-CM

## 2024-12-16 PROCEDURE — 1159F MED LIST DOCD IN RCRD: CPT | Performed by: NURSE PRACTITIONER

## 2024-12-16 PROCEDURE — 1157F ADVNC CARE PLAN IN RCRD: CPT | Performed by: NURSE PRACTITIONER

## 2024-12-16 PROCEDURE — 99211 OFF/OP EST MAY X REQ PHY/QHP: CPT | Performed by: NURSE PRACTITIONER

## 2024-12-16 PROCEDURE — 51700 IRRIGATION OF BLADDER: CPT | Performed by: NURSE PRACTITIONER

## 2024-12-16 PROCEDURE — 1160F RVW MEDS BY RX/DR IN RCRD: CPT | Performed by: NURSE PRACTITIONER

## 2024-12-16 PROCEDURE — 1036F TOBACCO NON-USER: CPT | Performed by: NURSE PRACTITIONER

## 2024-12-16 PROCEDURE — 1126F AMNT PAIN NOTED NONE PRSNT: CPT | Performed by: NURSE PRACTITIONER

## 2024-12-16 ASSESSMENT — PAIN SCALES - GENERAL: PAINLEVEL_OUTOF10: 0-NO PAIN

## 2024-12-16 NOTE — PROGRESS NOTES
[facility]  Outpatient Clinic Note    Subjective   Yayo Fraga Jr. is a 89 y.o. male    History of Present Illness   Patient presenting to clinic today for TOV s/p HoLEP with Dr. Mcgarry 12/13/2024  History of BPH with LUTS. He has been doing well since surgery. Urine has become clear, yellow without evidence of gross hematuria or clots. He denies any fevers or chills.    Past Medical History and Surgical History   Past Medical History:   Diagnosis Date    BPH (benign prostatic hyperplasia)     Cervical radiculopathy     Chronic back pain     just completed steriod taper, takes tylenol or Motrin for pain as needed, walks with cane    Delayed emergence from general anesthesia     Hemorrhoids     HLD (hyperlipidemia)     [place] (hard of hearing)     wears hearing aids    Hypothyroidism     TSH=4.23 12/7/23    Impotence     Long Q-T syndrome     Lower urinary tract symptoms (LUTS)     Personal history of other diseases of the digestive system 01/02/2018    History of diverticulosis    Prostate CA (Multi)     Sick sinus syndrome (Multi) 01/02/2018    Sick sinus syndrome, has a pacemaker    Syncope     pre-pacer, no issues since pacemaker    Tinnitus     Tremor     Bilateral hands, left worse than right    Urinary retention     St Caths self 2-3x/day    Vitamin D deficiency      Past Surgical History:   Procedure Laterality Date    CARDIAC PACEMAKER PLACEMENT  02/11/2015    Pacemaker Placement    COLONOSCOPY  02/11/2015    Complete Colonoscopy    HERNIA REPAIR  02/11/2015    Hernia Repair    OTHER SURGICAL HISTORY  01/02/2018    Needle Biopsy Of Prostate       Medications  Current Outpatient Medications on File Prior to Visit   Medication Sig Dispense Refill    acetaminophen (Tylenol 8 HOUR) 650 mg ER tablet Take 1 tablet (650 mg) by mouth every 8 hours if needed for mild pain (1 - 3). Do not crush, chew, or split.      alfuzosin (Uroxatral) 10 mg 24 hr tablet Take 1 tablet (10 mg) by mouth once daily. 90 tablet 3     amoxicillin-pot clavulanate (Augmentin) 875-125 mg tablet Take 1 tablet (875 mg) by mouth 2 times a day for 5 days. 10 tablet 0    bicalutamide (Casodex) 50 mg tablet Take 1 tablet (50 mg total) by mouth once daily. 30 tablet 11    cholecalciferol (Vitamin D-3) 50 mcg (2,000 unit) capsule Take 1 capsule (50 mcg) by mouth once daily.      finasteride (Proscar) 5 mg tablet Take 1 tablet (5 mg) by mouth once daily. 90 tablet 3    fish oil concentrate (Omega-3) 120-180 mg capsule Take 1 capsule (1 g) by mouth every other day.      gabapentin (Neurontin) 300 mg capsule Take 1 capsule (300 mg) by mouth 2 times a day as needed (Nerve pain) for up to 14 days. 28 capsule 0    ibuprofen 200 mg tablet Take 2 tablets (400 mg) by mouth every 6 hours if needed for mild pain (1 - 3).      LYCOPENE ORAL once daily.      magnesium oxide 500 mg magnesium tablet Take 1 tablet (500 mg) by mouth every other day.      meloxicam (Mobic) 15 mg tablet Take 1 tablet (15 mg) by mouth once daily for 21 days. 21 tablet 0    oxyCODONE (Roxicodone) 5 mg immediate release tablet Take 1 tablet (5 mg) by mouth every 6 hours if needed for severe pain (7 - 10). 5 tablet 0    phenazopyridine (Pyridium) 200 mg tablet Take 1 tablet (200 mg) by mouth 3 times a day as needed for bladder spasms. 10 tablet 0    phenazopyridine (Pyridium) 200 mg tablet Take 1 tablet (200 mg) by mouth 3 times a day as needed for bladder spasms. 10 tablet 0    polyethylene glycol (Glycolax, Miralax) 17 gram packet Take 17 g by mouth once daily for 10 days. 10 packet 0    simvastatin (Zocor) 40 mg tablet TAKE 1 TABLET BY MOUTH EVERY DAY 90 tablet 3    sulfamethoxazole-trimethoprim (Bactrim DS) 800-160 mg tablet Take 1 tablet by mouth 2 times a day for 5 days. 10 tablet 0     No current facility-administered medications on file prior to visit.       Objective   Physicial Exam  General: Well developed, well nourished, alert and cooperative, appears in no acute distress  Eyes:  Non-injected conjunctiva, sclera clear, no proptosis  Cardiac: Extremities are warm and well perfused. No edema, cyanosis or pallor.   Lungs: Breathing is easy, non-labored. Speaking in clear and complete sentences. Normal diaphragmatic movement.  MSK: Ambulatory with steady gait, unassisted  Neuro: alert and oriented to person, place and time  Psych: Demonstrates good judgement and reason, without hallucinations, abnormal affect or abnormal behaviors.  Skin: no obvious lesions, no rashes.    Lab on 12/06/2024   Component Date Value Ref Range Status    Glucose 12/06/2024 95  74 - 99 mg/dL Final    Sodium 12/06/2024 138  136 - 145 mmol/L Final    Potassium 12/06/2024 4.2  3.5 - 5.3 mmol/L Final    Chloride 12/06/2024 102  98 - 107 mmol/L Final    Bicarbonate 12/06/2024 28  21 - 32 mmol/L Final    Anion Gap 12/06/2024 12  10 - 20 mmol/L Final    Urea Nitrogen 12/06/2024 25 (H)  6 - 23 mg/dL Final    Creatinine 12/06/2024 1.11  0.50 - 1.30 mg/dL Final    eGFR 12/06/2024 63  >60 mL/min/1.73m*2 Final    Calculations of estimated GFR are performed using the 2021 CKD-EPI Study Refit equation without the race variable for the IDMS-Traceable creatinine methods.  https://jasn.asnjournals.org/content/early/2021/09/22/ASN.7131332614    Calcium 12/06/2024 9.0  8.6 - 10.3 mg/dL Final    WBC 12/06/2024 10.9  4.4 - 11.3 x10*3/uL Final    nRBC 12/06/2024 0.0  0.0 - 0.0 /100 WBCs Final    RBC 12/06/2024 4.66  4.50 - 5.90 x10*6/uL Final    Hemoglobin 12/06/2024 14.1  13.5 - 17.5 g/dL Final    Hematocrit 12/06/2024 42.7  41.0 - 52.0 % Final    MCV 12/06/2024 92  80 - 100 fL Final    MCH 12/06/2024 30.3  26.0 - 34.0 pg Final    MCHC 12/06/2024 33.0  32.0 - 36.0 g/dL Final    RDW 12/06/2024 13.8  11.5 - 14.5 % Final    Platelets 12/06/2024 200  150 - 450 x10*3/uL Final    Color, Urine 12/06/2024 Light-Yellow  Light-Yellow, Yellow, Dark-Yellow Final    Appearance, Urine 12/06/2024 Turbid (N)  Clear Final    Specific Gravity, Urine  12/06/2024 1.018  1.005 - 1.035 Final    pH, Urine 12/06/2024 7.5  5.0, 5.5, 6.0, 6.5, 7.0, 7.5, 8.0 Final    Protein, Urine 12/06/2024 NEGATIVE  NEGATIVE, 10 (TRACE), 20 (TRACE) mg/dL Final    Glucose, Urine 12/06/2024 Normal  Normal mg/dL Final    Blood, Urine 12/06/2024 0.03 (TRACE) (A)  NEGATIVE Final    Ketones, Urine 12/06/2024 NEGATIVE  NEGATIVE mg/dL Final    Bilirubin, Urine 12/06/2024 NEGATIVE  NEGATIVE Final    Urobilinogen, Urine 12/06/2024 Normal  Normal mg/dL Final    Nitrite, Urine 12/06/2024 NEGATIVE  NEGATIVE Final    Leukocyte Esterase, Urine 12/06/2024 500 Jamir/µL (A)  NEGATIVE Final    WBC, Urine 12/06/2024 >50 (A)  1-5, NONE /HPF Final    RBC, Urine 12/06/2024 11-20 (A)  NONE, 1-2, 3-5 /HPF Final    Urine Culture 12/06/2024 Growth indicates contamination with periurethral calvin. Repeat culture if clinically indicated.   Final   Pre-Admission Testing on 12/06/2024   Component Date Value Ref Range Status    Ventricular Rate 12/06/2024 74  BPM Final    Atrial Rate 12/06/2024 83  BPM Final    QRS Duration 12/06/2024 74  ms Final    QT Interval 12/06/2024 414  ms Final    QTC Calculation(Bazett) 12/06/2024 459  ms Final    R Axis 12/06/2024 16  degrees Final    T Axis 12/06/2024 46  degrees Final    QRS Count 12/06/2024 12  beats Final    Q Onset 12/06/2024 230  ms Final    T Offset 12/06/2024 437  ms Final    QTC Fredericia 12/06/2024 444  ms Final      Review of Systems  All other systems have been reviewed and are negative for complaint.      Assessment and Plan   We discussed postoperative urinary retention in great detail. We discussed that different medications, including anesthesia can influence urinary retention. We discussed that postoperative constipation can also contribute to urinary retention. We discussed management of urinary retention to include indwelling catheter or CIC. We discussed risks of unmanaged urinary retention including irreversible kidney injury and increased risk of UTI.  We discussed TOV today.    [] TOV today passed   [] Drink plenty of fluids to maintain hydration and clear urine output  [] You may have some irritative voiding symptoms over the next few days: burning, frequency, urgency  [] Activity restrictions reviewed    He will return to clinic in 3 months for post-operative visit with Dr. Marie, or sooner if needed.    All questions and concerns were addressed. Patient verbalizes understanding and has no other questions at this time.       Sienna Jimenez-- YOHSI UNGER  Office Phone:  [phone]

## 2024-12-22 NOTE — PROGRESS NOTES
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 Arroyo, declined additional therapy and remained on Casodex  alone, PSA response, rafael 1.13 11/23     Provider Team  Burt Arroyo MD Thomas J King, MD Oliver Anchetta Sue Flick  PCP - Lupis Valente  EP / Cardiology f/u   [facility]   [facility]/ Jorge Mcgarry - urology     Other contributing history  -Hypothyroidism, sick sinus syndrome, pacemaker, osteopenia, long qt syndrome, BPH / LUTS urinary incontinence ( finasteride, alfuzosin), CIC, Holep 12/24        Interval history:  The patient presents for follow up.  The patient is accompanied by his daughter the intermittent pain and discomfort he was having in his right thigh right knee have shown some improvement he is also notes dramatic improvement after the follow-up procedure with improvement of his urinary functions he is working with physical therapy did have a plain film which does show some degenerative changes in the spine denies any issues with bowel or bladder incontinence.  Denies any severe issues with back pain mostly pain in his right knee and right leg.  Denies any nausea, vomiting, fevers, chills, easy bruising or bleeding denies any issues with chest pain or chest tightness appetite and energy is otherwise well.    ROS:  10-pt ROS reviewed  and negative except as mentioned above.    Medications: below was reviewed and is accurate  Current Outpatient Medications   Medication Instructions    acetaminophen (TYLENOL 8 HOUR) 650 mg, Every 8 hours PRN    alfuzosin (UROXATRAL) 10 mg, oral, Daily    bicalutamide (CASODEX) 50 mg, oral, Daily    cholecalciferol (Vitamin D-3) 50 mcg (2,000 unit) capsule 1 capsule, Daily    finasteride (PROSCAR) 5 mg, oral, Daily    fish oil concentrate (Omega-3) 120-180 mg capsule Take 1 capsule (1 g) by mouth every other day.    gabapentin (NEURONTIN) 300 mg, oral, 2 times daily PRN    ibuprofen 400 mg, Every 6 hours PRN    LYCOPENE ORAL once daily.    magnesium oxide 500 mg magnesium tablet 1 tablet, Every other day    meloxicam (MOBIC) 15 mg, oral, Daily    oxyCODONE (ROXICODONE) 5 mg, oral, Every 6 hours PRN    phenazopyridine (PYRIDIUM) 200 mg, oral, 3 times daily PRN    phenazopyridine (PYRIDIUM) 200 mg, oral, 3 times daily PRN    simvastatin (Zocor) 40 mg tablet TAKE 1 TABLET BY MOUTH EVERY DAY        Detailed family history and social history reviewed in detail and updated per epic charting and in detail with the patient    Physical exam:  Vitals:    12/24/24 1012   BP: 118/66   Pulse: 76   Resp: 18   Temp: 35.9 °C (96.6 °F)   TempSrc: Core   SpO2: 96%   Weight: 75.5 kg (166 lb 7.2 oz)       GEN: NAD, well-appearing  SKIN: no concerning new lesions examined in upper / lower extremity   LUNGS: CTA  CV: RRR no clear R/G  ABD: Soft, NTND. No rebound or guarding.  EXT:  trace edema bilaterally   NEURO: Grossly intact. No focal deficits.  Does use a cane  PSYCH: Normal mood and affect      Radiology review  Reviewed in detail personally and for detail see results in EPIC  Last scans 2021    Genomics Data    Laboratory review  Reviewed in detail personally and for detail see results in EPIC  .  Lab Results   Component Value Date    WBC 10.9 12/06/2024    HGB 14.1 12/06/2024    HCT 42.7 12/06/2024    MCV 92 12/06/2024      12/06/2024     Lab Results   Component Value Date    GLUCOSE 95 12/06/2024    CALCIUM 9.0 12/06/2024     12/06/2024    K 4.2 12/06/2024    CO2 28 12/06/2024     12/06/2024    BUN 25 (H) 12/06/2024    CREATININE 1.11 12/06/2024     Lab Results   Component Value Date    PSA 1.90 11/11/2024    PSA 1.77 08/19/2024    PSA 1.48 05/20/2024     Lab Results   Component Value Date    ALT 9 (L) 11/11/2024    AST 17 11/11/2024    ALKPHOS 52 11/11/2024    BILITOT 1.1 11/11/2024     Lab Results   Component Value Date    TESTOSTERONE 847 11/11/2024         ASSESSMENT AND PLAN     Prostate Cancer -see previous note for details he has been holding off on intervention some nonspecific pain and discomfort in his mostly right pelvic area and thigh and leg without significant back pain plain films that show nonspecific arthritis discussed in detail options at this juncture and further workup including imaging if related to prostate cancer and potential role of intensifying treatment with ADT and/or radiation to help with symptoms at this point it is improving with physical therapy he wants to hold off on intervention he is aware the risk of progression discussed follow-up in 6 weeks but preferred follow-up in 3 months he will contact us with worsening pain and we could perform imaging sooner we will arrange follow-up at that time and he will contact us for any other new complaints.  CT findings 2021 pulmonary / pancreas findings - - role of repeat imaging discussed and he wanted to hold off for now  LUTS -continue follow-up with urology significant improvement post HoLEP  Hypocalcemia-borderline did recommend calcium and vitamin D supplementation      Burt Arroyo MD  Senior Attending Physician/  in Medicine [employer] [employer]  [facility]  Patient line [phone]  Fax [phone]

## 2024-12-23 LAB
LABORATORY COMMENT REPORT: NORMAL
PATH REPORT.COMMENTS IMP SPEC: NORMAL
PATH REPORT.FINAL DX SPEC: NORMAL
PATH REPORT.GROSS SPEC: NORMAL
PATH REPORT.RELEVANT HX SPEC: NORMAL
PATH REPORT.TOTAL CANCER: NORMAL

## 2024-12-24 ENCOUNTER — OFFICE VISIT (OUTPATIENT)
Dept: HEMATOLOGY/ONCOLOGY | Facility: CLINIC | Age: 89
End: 2024-12-24
Payer: MEDICARE

## 2024-12-24 VITALS
OXYGEN SATURATION: 96 % | TEMPERATURE: 96.6 F | WEIGHT: 166.45 LBS | RESPIRATION RATE: 18 BRPM | BODY MASS INDEX: 21.96 KG/M2 | DIASTOLIC BLOOD PRESSURE: 66 MMHG | SYSTOLIC BLOOD PRESSURE: 118 MMHG | HEART RATE: 76 BPM

## 2024-12-24 DIAGNOSIS — C61 ADENOCARCINOMA OF PROSTATE (MULTI): ICD-10-CM

## 2024-12-24 PROCEDURE — 99214 OFFICE O/P EST MOD 30 MIN: CPT | Performed by: INTERNAL MEDICINE

## 2024-12-24 PROCEDURE — 1159F MED LIST DOCD IN RCRD: CPT | Performed by: INTERNAL MEDICINE

## 2024-12-24 PROCEDURE — 1036F TOBACCO NON-USER: CPT | Performed by: INTERNAL MEDICINE

## 2024-12-24 PROCEDURE — 1125F AMNT PAIN NOTED PAIN PRSNT: CPT | Performed by: INTERNAL MEDICINE

## 2024-12-24 PROCEDURE — 1157F ADVNC CARE PLAN IN RCRD: CPT | Performed by: INTERNAL MEDICINE

## 2024-12-24 ASSESSMENT — ENCOUNTER SYMPTOMS
DEPRESSION: 0
OCCASIONAL FEELINGS OF UNSTEADINESS: 0
LOSS OF SENSATION IN FEET: 0

## 2024-12-24 ASSESSMENT — PAIN SCALES - GENERAL: PAINLEVEL_OUTOF10: 5

## 2024-12-31 DIAGNOSIS — M54.12 CERVICAL RADICULOPATHY: Primary | ICD-10-CM

## 2025-01-06 ENCOUNTER — TELEPHONE (OUTPATIENT)
Dept: UROLOGY | Facility: HOSPITAL | Age: OVER 89
End: 2025-01-06
Payer: MEDICARE

## 2025-01-06 NOTE — TELEPHONE ENCOUNTER
Called and left a message to have pt call the office back to r/s upcoming urology appointment. Dr. Mcgarry wants to see patient himself instead of having him see NP Brandy

## 2025-01-07 ENCOUNTER — APPOINTMENT (OUTPATIENT)
Dept: UROLOGY | Facility: CLINIC | Age: OVER 89
End: 2025-01-07
Payer: MEDICARE

## 2025-01-07 ENCOUNTER — APPOINTMENT (OUTPATIENT)
Dept: UROLOGY | Facility: HOSPITAL | Age: OVER 89
End: 2025-01-07
Payer: MEDICARE

## 2025-01-09 ENCOUNTER — OFFICE VISIT (OUTPATIENT)
Dept: UROLOGY | Facility: HOSPITAL | Age: OVER 89
End: 2025-01-09
Payer: MEDICARE

## 2025-01-09 ENCOUNTER — APPOINTMENT (OUTPATIENT)
Dept: PHYSICAL THERAPY | Facility: CLINIC | Age: OVER 89
End: 2025-01-09
Payer: MEDICARE

## 2025-01-09 DIAGNOSIS — N40.1 BENIGN PROSTATIC HYPERPLASIA WITH URINARY RETENTION: ICD-10-CM

## 2025-01-09 DIAGNOSIS — C61 ADENOCARCINOMA OF PROSTATE (MULTI): Primary | ICD-10-CM

## 2025-01-09 DIAGNOSIS — R33.8 BENIGN PROSTATIC HYPERPLASIA WITH URINARY RETENTION: ICD-10-CM

## 2025-01-09 PROCEDURE — 99214 OFFICE O/P EST MOD 30 MIN: CPT | Performed by: UROLOGY

## 2025-01-09 PROCEDURE — 1157F ADVNC CARE PLAN IN RCRD: CPT | Performed by: UROLOGY

## 2025-01-09 PROCEDURE — 99214 OFFICE O/P EST MOD 30 MIN: CPT | Mod: 24 | Performed by: UROLOGY

## 2025-01-09 PROCEDURE — G2211 COMPLEX E/M VISIT ADD ON: HCPCS | Performed by: UROLOGY

## 2025-01-09 NOTE — PROGRESS NOTES
HPI    89 y.o. male being seen with the following problem list:    Problem list:  BPH with obstructive LUTS - Taking alfuzosin and finasteride daily Now s/p HoLEP 12/13/24 Emily score 9 (4+5), GG 5   Oligometastatic prostate cancer - on bicaltamide       He was unable to urinate so his daughter placed a madrid catheter and drained about 300 ml of urine. The madrid catheter felt uncomfortable so they removed it. He is taking finasteride and alfuzosin as prescribed. His PVR is 49 ml.      11/4 Ucx - neg   11/4 UA - neg     11/07/24 - BPH, urinary retention, currently on madrid cath. This is the 1st time he's had a cath in. On alfuzosin and finasteride. Has small hemorrhoids.      11/11/24 - PVR 277cc with Dr. DAHL     11/21/24 - Presents today for cystoscopy. He is currently on SIC.     12/16/25 - Passed TOV with [facility]     01/09/25 - PVR 10cc Emptying great, cath free. Stream is stronger, no leakage. Some soreness around the prostate area but manageable.         Lab Results   Component Value Date    PSA 1.90 11/11/2024    PSA 1.77 08/19/2024    PSA 1.48 05/20/2024    PSA 1.13 11/22/2023    PSA 2.73 08/28/2023              Current Medications:  Current Outpatient Medications   Medication Sig Dispense Refill    acetaminophen (Tylenol 8 HOUR) 650 mg ER tablet Take 1 tablet (650 mg) by mouth every 8 hours if needed for mild pain (1 - 3). Do not crush, chew, or split.      alfuzosin (Uroxatral) 10 mg 24 hr tablet Take 1 tablet (10 mg) by mouth once daily. (Patient not taking: Reported on 12/24/2024) 90 tablet 3    bicalutamide (Casodex) 50 mg tablet Take 1 tablet (50 mg total) by mouth once daily. 30 tablet 11    cholecalciferol (Vitamin D-3) 50 mcg (2,000 unit) capsule Take 1 capsule (50 mcg) by mouth once daily.      finasteride (Proscar) 5 mg tablet Take 1 tablet (5 mg) by mouth once daily. (Patient not taking: Reported on 12/24/2024) 90 tablet 3    fish oil concentrate (Omega-3) 120-180 mg capsule Take 1 capsule (1 g) by  mouth every other day.      gabapentin (Neurontin) 300 mg capsule Take 1 capsule (300 mg) by mouth 2 times a day as needed (Nerve pain) for up to 14 days. 28 capsule 0    ibuprofen 200 mg tablet Take 2 tablets (400 mg) by mouth every 6 hours if needed for mild pain (1 - 3). (Patient not taking: Reported on 12/24/2024)      LYCOPENE ORAL once daily.      magnesium oxide 500 mg magnesium tablet Take 1 tablet (500 mg) by mouth every other day.      oxyCODONE (Roxicodone) 5 mg immediate release tablet Take 1 tablet (5 mg) by mouth every 6 hours if needed for severe pain (7 - 10). (Patient not taking: Reported on 12/24/2024) 5 tablet 0    phenazopyridine (Pyridium) 200 mg tablet Take 1 tablet (200 mg) by mouth 3 times a day as needed for bladder spasms. (Patient not taking: Reported on 12/24/2024) 10 tablet 0    phenazopyridine (Pyridium) 200 mg tablet Take 1 tablet (200 mg) by mouth 3 times a day as needed for bladder spasms. (Patient not taking: Reported on 12/24/2024) 10 tablet 0    simvastatin (Zocor) 40 mg tablet TAKE 1 TABLET BY MOUTH EVERY DAY 90 tablet 3     No current facility-administered medications for this visit.        Active Problems:  Yayo Fraga Jr. is a 89 y.o. male with the following Problems and Medications.  Patient Active Problem List   Diagnosis    Abnormal blood chemistry level    Adenocarcinoma of prostate (Multi)    BPH with obstruction/lower urinary tract symptoms    Sensorineural hearing loss, bilateral    Tinnitus of both ears    Cervical radiculopathy    Chronic pain    Mixed hyperlipidemia    Impotence    Prolonged QT interval    Long Q-T syndrome    Cardiac pacemaker in situ    Sick sinus syndrome (Multi)    Subclinical hypothyroidism    Vitamin D deficiency    Syncope    Condition not found    Tremor of both hands    Essential tremor    Impairment of auditory discrimination    Impacted cerumen    Diverticular disease    Other seborrheic keratosis    Actinic keratosis    Other pruritus     Other seborrheic dermatitis    Neoplasm of uncertain behavior of skin    Melanocytic nevi of trunk    Left-sided sensorineural hearing loss    Stomach upset    Hemorrhoids    Constipation    Need for influenza vaccination    Need for pneumococcal 20-valent conjugate vaccination    Benign prostatic hyperplasia with urinary retention     Current Outpatient Medications   Medication Sig Dispense Refill    acetaminophen (Tylenol 8 HOUR) 650 mg ER tablet Take 1 tablet (650 mg) by mouth every 8 hours if needed for mild pain (1 - 3). Do not crush, chew, or split.      alfuzosin (Uroxatral) 10 mg 24 hr tablet Take 1 tablet (10 mg) by mouth once daily. (Patient not taking: Reported on 12/24/2024) 90 tablet 3    bicalutamide (Casodex) 50 mg tablet Take 1 tablet (50 mg total) by mouth once daily. 30 tablet 11    cholecalciferol (Vitamin D-3) 50 mcg (2,000 unit) capsule Take 1 capsule (50 mcg) by mouth once daily.      finasteride (Proscar) 5 mg tablet Take 1 tablet (5 mg) by mouth once daily. (Patient not taking: Reported on 12/24/2024) 90 tablet 3    fish oil concentrate (Omega-3) 120-180 mg capsule Take 1 capsule (1 g) by mouth every other day.      gabapentin (Neurontin) 300 mg capsule Take 1 capsule (300 mg) by mouth 2 times a day as needed (Nerve pain) for up to 14 days. 28 capsule 0    ibuprofen 200 mg tablet Take 2 tablets (400 mg) by mouth every 6 hours if needed for mild pain (1 - 3). (Patient not taking: Reported on 12/24/2024)      LYCOPENE ORAL once daily.      magnesium oxide 500 mg magnesium tablet Take 1 tablet (500 mg) by mouth every other day.      oxyCODONE (Roxicodone) 5 mg immediate release tablet Take 1 tablet (5 mg) by mouth every 6 hours if needed for severe pain (7 - 10). (Patient not taking: Reported on 12/24/2024) 5 tablet 0    phenazopyridine (Pyridium) 200 mg tablet Take 1 tablet (200 mg) by mouth 3 times a day as needed for bladder spasms. (Patient not taking: Reported on 12/24/2024) 10 tablet 0     phenazopyridine (Pyridium) 200 mg tablet Take 1 tablet (200 mg) by mouth 3 times a day as needed for bladder spasms. (Patient not taking: Reported on 12/24/2024) 10 tablet 0    simvastatin (Zocor) 40 mg tablet TAKE 1 TABLET BY MOUTH EVERY DAY 90 tablet 3     No current facility-administered medications for this visit.       PMH:  Past Medical History:   Diagnosis Date    BPH (benign prostatic hyperplasia)     Cervical radiculopathy     Chronic back pain     just completed steriod taper, takes tylenol or Motrin for pain as needed, walks with cane    Delayed emergence from general anesthesia     Hemorrhoids     HLD (hyperlipidemia)     [place] (hard of hearing)     wears hearing aids    Hypothyroidism     TSH=4.23 12/7/23    Impotence     Long Q-T syndrome     Lower urinary tract symptoms (LUTS)     Personal history of other diseases of the digestive system 01/02/2018    History of diverticulosis    Prostate CA (Multi)     Sick sinus syndrome (Multi) 01/02/2018    Sick sinus syndrome, has a pacemaker    Syncope     pre-pacer, no issues since pacemaker    Tinnitus     Tremor     Bilateral hands, left worse than right    Urinary retention     St Caths self 2-3x/day    Vitamin D deficiency        PSH:  Past Surgical History:   Procedure Laterality Date    CARDIAC PACEMAKER PLACEMENT  02/11/2015    Pacemaker Placement    COLONOSCOPY  02/11/2015    Complete Colonoscopy    HERNIA REPAIR  02/11/2015    Hernia Repair    OTHER SURGICAL HISTORY  01/02/2018    Needle Biopsy Of Prostate       FMH:  Family History   Problem Relation Name Age of Onset    Alzheimer's disease Mother      Prostate cancer Father      Prostate cancer Brother      Alzheimer's disease Maternal Grandmother      Alzheimer's disease Mother's Sister         SHx:  Social History     Tobacco Use    Smoking status: Never    Smokeless tobacco: Never   Vaping Use    Vaping status: Never Used   Substance Use Topics    Alcohol use: Not Currently     Comment: 1 beer  every few weeks    Drug use: Never       Allergies:  No Known Allergies      Assessment/Plan  About 1 month s/p HoLEP. Emptying great, he is cath free. Stream is stronger, no leakage. Some soreness, discussed it may take some time for thing to heal, longer in his case given history of PCa.     He is scheduled to follow up with Dr. Arroyo on 3/14/25, I will inform Dr. Arroyo about the change in his pathology.    Follow up in 3 months with JAYLEN[facility]  By signing my name below, I, Rosanne Coello, attest that this documentation has been prepared under the direction and in the presence of Jorge Mcgarry MD.

## 2025-01-15 ENCOUNTER — TELEPHONE (OUTPATIENT)
Dept: HEMATOLOGY/ONCOLOGY | Facility: CLINIC | Age: OVER 89
End: 2025-01-15
Payer: MEDICARE

## 2025-01-15 NOTE — TELEPHONE ENCOUNTER
Called to discuss  Pathology reviewed with urology  In the past refused escalation or staging  Unable to leave message  Sent message in my chart

## 2025-01-24 ENCOUNTER — APPOINTMENT (OUTPATIENT)
Dept: GERIATRIC MEDICINE | Facility: CLINIC | Age: OVER 89
End: 2025-01-24
Payer: MEDICARE

## 2025-01-31 ENCOUNTER — OFFICE VISIT (OUTPATIENT)
Dept: ORTHOPEDIC SURGERY | Facility: CLINIC | Age: OVER 89
End: 2025-01-31
Payer: MEDICARE

## 2025-01-31 DIAGNOSIS — M54.16 LUMBAR RADICULOPATHY: ICD-10-CM

## 2025-01-31 PROCEDURE — 1159F MED LIST DOCD IN RCRD: CPT | Performed by: STUDENT IN AN ORGANIZED HEALTH CARE EDUCATION/TRAINING PROGRAM

## 2025-01-31 PROCEDURE — 1157F ADVNC CARE PLAN IN RCRD: CPT | Performed by: STUDENT IN AN ORGANIZED HEALTH CARE EDUCATION/TRAINING PROGRAM

## 2025-01-31 PROCEDURE — 99213 OFFICE O/P EST LOW 20 MIN: CPT | Performed by: STUDENT IN AN ORGANIZED HEALTH CARE EDUCATION/TRAINING PROGRAM

## 2025-01-31 ASSESSMENT — PAIN - FUNCTIONAL ASSESSMENT: PAIN_FUNCTIONAL_ASSESSMENT: 0-10

## 2025-01-31 ASSESSMENT — PAIN SCALES - GENERAL: PAINLEVEL_OUTOF10: 0 - NO PAIN

## 2025-01-31 NOTE — PROGRESS NOTES
Orthopaedic Spine Surgery Clinic Note    Patient ID: Yayo Fraga Jr. is a 89 y.o. male.    Chief Complaint  Chief Complaint   Patient presents with    Lower Back - Follow-up       History of Present Illness  Mr. Fraga presents for follow-up evaluation of right-sided low back and lower extremity pain.  Since we last saw him in the office, we referred him to physical therapy for a low back stretching and strengthening program.  We had also prescribed gabapentin for some of his symptoms.  Patient has a history of prostate cancer for which she is followed closely by urology.  Since his last visit with us, patient states that he has been feeling much better.  He has been working with a chiropractor as well as initiated physical therapy (aquatic and land-based) at the [facility].  This has been very helpful, he has found significant improvement in his low back and lower extremity pains.      12/6/24:  Mr. Fraga is a pleasant 89-year-old male who presents for initial evaluation of acute right-sided low back and lower extremity pain.  Patient states there was no inciting event or injury.  Approximately 1-1/2 weeks ago, he started experiencing pain on the right side of his low back that proceeded down into his right hip and over to his right thigh that terminated at his knee.  Intermittently he would experience some paresthesias down in the lower leg on the right side.  Denies left lower extremity pain or symptoms.  His symptoms worsen specifically when he lays flat in bed.  It is disruptive to sleep as a result.  He also finds that sitting or assuming a flexed forward position while standing or walking to be helpful.  At baseline he is quite active, walking without assistive device.  However now he is requiring some cane assistance or walker assistance for longer distances.  This discomfort has been quite debilitating for him.  He denies acute bowel or bladder control issues, although he is an active evaluation  and discussion with urology (Dr. Mcgarry) for BPH with obstruction.  He is reportedly scheduled next week for a urologic procedure to help relieve this.      He has not had any formal treatment for his low back or lower extremity pain.  He has taken as needed Tylenol and ibuprofen.  He was prescribed a steroid taper pack, which she found minimally helpful.    Prior treatments:  As needed Tylenol, ibuprofen, steroid taper pack    Relevant PMH/PSH for spine  BPH, procedure scheduled next week with urology    Past Medical History:   Diagnosis Date    BPH (benign prostatic hyperplasia)     Cervical radiculopathy     Chronic back pain     just completed steriod taper, takes tylenol or Motrin for pain as needed, walks with cane    Delayed emergence from general anesthesia     Hemorrhoids     HLD (hyperlipidemia)     [place] (hard of hearing)     wears hearing aids    Hypothyroidism     TSH=4.23 12/7/23    Impotence     Long Q-T syndrome     Lower urinary tract symptoms (LUTS)     Personal history of other diseases of the digestive system 01/02/2018    History of diverticulosis    Prostate CA (Multi)     Sick sinus syndrome (Multi) 01/02/2018    Sick sinus syndrome, has a pacemaker    Syncope     pre-pacer, no issues since pacemaker    Tinnitus     Tremor     Bilateral hands, left worse than right    Urinary retention     St Caths self 2-3x/day    Vitamin D deficiency        Past Surgical History:   Procedure Laterality Date    CARDIAC PACEMAKER PLACEMENT  02/11/2015    Pacemaker Placement    COLONOSCOPY  02/11/2015    Complete Colonoscopy    HERNIA REPAIR  02/11/2015    Hernia Repair    OTHER SURGICAL HISTORY  01/02/2018    Needle Biopsy Of Prostate       Social History     Socioeconomic History    Marital status:    Tobacco Use    Smoking status: Never    Smokeless tobacco: Never   Vaping Use    Vaping status: Never Used   Substance and Sexual Activity    Alcohol use: Not Currently     Comment: 1 beer every few weeks     Drug use: Never    Sexual activity: Defer       Family History   Problem Relation Name Age of Onset    Alzheimer's disease Mother      Prostate cancer Father      Prostate cancer Brother      Alzheimer's disease Maternal Grandmother      Alzheimer's disease Mother's Sister         No Known Allergies    Current Outpatient Medications   Medication Instructions    acetaminophen (TYLENOL 8 HOUR) 650 mg, Every 8 hours PRN    alfuzosin (UROXATRAL) 10 mg, oral, Daily    bicalutamide (CASODEX) 50 mg, oral, Daily    cholecalciferol (Vitamin D-3) 50 mcg (2,000 unit) capsule 1 capsule, Daily    finasteride (PROSCAR) 5 mg, oral, Daily    fish oil concentrate (Omega-3) 120-180 mg capsule Take 1 capsule (1 g) by mouth every other day.    gabapentin (NEURONTIN) 300 mg, oral, 2 times daily PRN    ibuprofen 400 mg, Every 6 hours PRN    LYCOPENE ORAL once daily.    magnesium oxide 500 mg magnesium tablet 1 tablet, Every other day    oxyCODONE (ROXICODONE) 5 mg, oral, Every 6 hours PRN    phenazopyridine (PYRIDIUM) 200 mg, oral, 3 times daily PRN    phenazopyridine (PYRIDIUM) 200 mg, oral, 3 times daily PRN    simvastatin (Zocor) 40 mg tablet TAKE 1 TABLET BY MOUTH EVERY DAY         Vitals & Measurements  There were no vitals filed for this visit.     Ortho Exam  General: AO x 3, NAD  Cardio: examined extremities perfused by peripheral palpation  Resp: breathing unlabored  Gait: slow, careful gait. Assumes flexed forward lumbar posture.     Lower Extremity  Right  Left  IP   4/5  5/5  Quadriceps  4/5  5/5  Tibialis anterior  5/5  5/5  EHL   5/5  5/5  Gastrocnemius  5/5  5/5    Sensation: Normal to light touch throughout lower extremities bilaterally.        Diagnostic Results - Imaging    XR lumbar spine, 11/26/24  FINDINGS:  Advanced spondylosis throughout the visualized lumbar spine. Grade 1  anterolisthesis L5 on S1 without significant change on flexion or  extension views. Moderate atherosclerotic calcifications of the aorta  and  iliac vasculature.      IMPRESSION:  Advanced lumbar spondylosis. Grade 1 anterolisthesis L5 on S1. No  change on flexion or extension views.        Diagnosis  Encounter Diagnosis   Name Primary?    Lumbar radiculopathy           Assessment/Plan  Mr. Fraga is a pleasant 89-year-old male who presents for follow-up evaluation of right-sided low back and right lower extremity pain. XR imaging demonstrates advanced lumbar spondylosis with L5-S1 spondylolisthesis.  Since we last saw him in the office, we prescribed him physical therapy for a low back stretching and strengthening program.  He has been seen by a chiropractor for a couple visits in addition to initiating aquatic and land-based therapy at the [facility].  He has found these therapeutic modalities very helpful and has found significant relief in his low back and lower extremity pains.    We had an extensive discussion with the patient and his wife  in the office today about his symptoms, his imaging findings, and possible management options.  We discussed how it was reassuring that his therapeutic programs have been very beneficial in his low back and lower extremity pains.  We discussed continuing therapy as prescribed moving forward.  We did discuss watching for any recurrence of his symptoms or worsening.  He does have a history of oligometastatic prostate cancer.  We discussed that if his symptoms do worsen, we will consider getting an MRI of his thoracic and lumbar spines in order to evaluate for any focus of stenosis that may be contributing to his symptoms, in addition to any focus of metastatic disease.  But if his symptoms are currently improving, we will continue to monitor this.    He will follow-up with my office on an as-needed basis if his symptoms help to improve or worsen. After our discussion, the patient articulated understanding of the plan and felt that all questions had been answered satisfactorily. The patient was pleased with  the visit and very appreciative for the care rendered.    **Please excuse any errors in grammar or translation related to this dictation. Voice recognition software was utilized to prepare this document. **    F/u PRN.       No orders of the defined types were placed in this encounter.      --    Danyel Manzano MD  Orthopaedic Spine Surgery  , Department of Orthopaedic Surgery  [facility]

## 2025-02-18 ENCOUNTER — APPOINTMENT (OUTPATIENT)
Dept: NEUROLOGY | Facility: CLINIC | Age: OVER 89
End: 2025-02-18
Payer: MEDICARE

## 2025-02-18 VITALS
BODY MASS INDEX: 21.24 KG/M2 | WEIGHT: 161 LBS | SYSTOLIC BLOOD PRESSURE: 117 MMHG | HEART RATE: 104 BPM | DIASTOLIC BLOOD PRESSURE: 76 MMHG | RESPIRATION RATE: 18 BRPM

## 2025-02-18 DIAGNOSIS — G20.C PARKINSONISM, UNSPECIFIED PARKINSONISM TYPE (MULTI): Primary | ICD-10-CM

## 2025-02-18 DIAGNOSIS — G62.9 NEUROPATHY: ICD-10-CM

## 2025-02-18 PROCEDURE — 99203 OFFICE O/P NEW LOW 30 MIN: CPT | Performed by: PSYCHIATRY & NEUROLOGY

## 2025-02-18 PROCEDURE — 1159F MED LIST DOCD IN RCRD: CPT | Performed by: PSYCHIATRY & NEUROLOGY

## 2025-02-18 PROCEDURE — 1160F RVW MEDS BY RX/DR IN RCRD: CPT | Performed by: PSYCHIATRY & NEUROLOGY

## 2025-02-18 PROCEDURE — 1036F TOBACCO NON-USER: CPT | Performed by: PSYCHIATRY & NEUROLOGY

## 2025-02-18 PROCEDURE — 1157F ADVNC CARE PLAN IN RCRD: CPT | Performed by: PSYCHIATRY & NEUROLOGY

## 2025-02-18 ASSESSMENT — UNIFIED PARKINSONS DISEASE RATING SCALE (UPDRS)
TOETAPPING_RIGHT: 1
SPONTANEITY_OF_MOVEMENT: 1
LEG_AGILITY_RIGHT: 1
AMPLITUDE_LUE: 1
PRONATION_SUPINATION_RIGHT: 0
KINETIC_TREMOR_LEFTHAND: 1
CHAIR_RISING_SCALE: 1
POSTURE: 4
FREEZING_GAIT: 0
AMPLITUDE_LLE: 0
RIGIDITY_RLE: 0
TOTAL_SCORE: 25
AMPLITUDE_RUE: 0
RIGIDITY_LUE: 0
POSTURAL_TREMOR_LEFTHAND: 2
KINETIC_TREMOR_RIGHTHAND: 0
DYSKINESIAS_PRESENT: NO
POSTURAL_STABILITY: 0
PRONATION_SUPINATION_LEFT: 0
POSTURAL_TREMOR_RIGHTHAND: 0
GAIT: 1
LEG_AGILITY_LEFT: 2
LEVODOPA: NO
FINGER_TAPPING_LEFT: 1
RIGIDITY_NECK: 4
FINGER_TAPPING_RIGHT: 0
AMPLITUDE_RLE: 0
PARKINSONS_MEDS: NO
TOETAPPING_LEFT: 2
AMPLITUDE_LIP_JAW: 0
RIGIDITY_LLE: 0
RIGIDITY_RUE: 0
FACIAL_EXPRESSION: 2
HANDMOVEMENTS_RIGHT: 0
CONSTANCY_TREMOR_ATREST: 1
SPEECH: 0

## 2025-02-18 ASSESSMENT — ENCOUNTER SYMPTOMS
DEPRESSION: 0
LOSS OF SENSATION IN FEET: 0
OCCASIONAL FEELINGS OF UNSTEADINESS: 0

## 2025-02-18 NOTE — PROGRESS NOTES
Subjective     Yayo Fraga Jr. is a ambidextrous  89 y.o. year old male who presents with No chief complaint on file.. Patient is accompanied by: spouse  Visit type: new patient visit     Prostate cancer diagnosed 2003, and long QT syndrome s/p pacemaker.  Here for evaluation of L>R tremor associated with action, onset around 2022    Patient reports he is here because he was told he might have parkinson's because he sometimes has tremors. Tremors started around 2022 in the L hand and then eventually in the R hand. No other body part affected. Some difficulties with fine motor skills. 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    Review of Motor symptoms:  Tremor:  Location- L hand> L hand                 Rest/postural/action- Posture and action, spills coffee  Stiffness/rigidity: No  Slowness:  No  Trouble walking:  Denies, uses a cane because of pain from lumbar degeneration. Currently in PT, evaluated by orth that recommended no intervention  Freezing of gait:  No  Balance problems:  Some  Falls:  No  Changes in speech:  No  Swallowing difficulties:  No  Abnormal postures:  No  Handwriting: No micrographia, just untidy  Activities of daily living (buttoning clothes, bathing, cutting food, etc):  Independent    Review of Non-Motor symptoms:  Cognition:  Memory- Unchanged for several years                        Hallucinations-  No                          Mood:         Depression-  No                       Anxiety: No                       Obsessive/compulsive behaviors- Always want to have plate warm before he eats. Requests this at restaurants too. New in the past year.  Sleep disturbances including REM behavior disorder: No  Sensory changes (ie, smell or taste):  No  Cramps/pain:  Rarely  Gastrointestinal complaints/Constipation:  No, takes magnesium every other day   Urinary retention or frequency:  Urgency once in a while but complicated hx with prostate cancer  Positional  lightheadedness and/or syncope:  No  Excessive saliva/drooling:  No  Fatigue:  Yes since being on Casodex     Fhx: No hx of tremors or PD      SoHx. Drinks rarely, does not smoke. Retired .     Patient Active Problem List   Diagnosis    Abnormal blood chemistry level    Adenocarcinoma of prostate (Multi)    BPH with obstruction/lower urinary tract symptoms    Sensorineural hearing loss, bilateral    Tinnitus of both ears    Cervical radiculopathy    Chronic pain    Mixed hyperlipidemia    Impotence    Prolonged QT interval    Long Q-T syndrome    Cardiac pacemaker in situ    Sick sinus syndrome (Multi)    Subclinical hypothyroidism    Vitamin D deficiency    Syncope    Condition not found    Tremor of both hands    Essential tremor    Impairment of auditory discrimination    Impacted cerumen    Diverticular disease    Other seborrheic keratosis    Actinic keratosis    Other pruritus    Other seborrheic dermatitis    Neoplasm of uncertain behavior of skin    Melanocytic nevi of trunk    Left-sided sensorineural hearing loss    Stomach upset    Hemorrhoids    Constipation    Need for influenza vaccination    Need for pneumococcal 20-valent conjugate vaccination    Benign prostatic hyperplasia with urinary retention      Past Medical History:   Diagnosis Date    BPH (benign prostatic hyperplasia)     Cervical radiculopathy     Chronic back pain     just completed steriod taper, takes tylenol or Motrin for pain as needed, walks with cane    Delayed emergence from general anesthesia     Hemorrhoids     HLD (hyperlipidemia)     [place] (hard of hearing)     wears hearing aids    Hypothyroidism     TSH=4.23 12/7/23    Impotence     Long Q-T syndrome     Lower urinary tract symptoms (LUTS)     Personal history of other diseases of the digestive system 01/02/2018    History of diverticulosis    Prostate CA (Multi)     Sick sinus syndrome (Multi) 01/02/2018    Sick sinus syndrome, has a pacemaker    Syncope     pre-pacer,  no issues since pacemaker    Tinnitus     Tremor     Bilateral hands, left worse than right    Urinary retention     St Caths self 2-3x/day    Vitamin D deficiency       Past Surgical History:   Procedure Laterality Date    CARDIAC PACEMAKER PLACEMENT  02/11/2015    Pacemaker Placement    COLONOSCOPY  02/11/2015    Complete Colonoscopy    HERNIA REPAIR  02/11/2015    Hernia Repair    OTHER SURGICAL HISTORY  01/02/2018    Needle Biopsy Of Prostate      Social History     Socioeconomic History    Marital status:      Spouse name: Not on file    Number of children: Not on file    Years of education: Not on file    Highest education level: Not on file   Occupational History    Not on file   Tobacco Use    Smoking status: Never    Smokeless tobacco: Never   Vaping Use    Vaping status: Never Used   Substance and Sexual Activity    Alcohol use: Not Currently     Comment: 1 beer every few weeks    Drug use: Never    Sexual activity: Defer   Other Topics Concern    Not on file   Social History Narrative    Not on file     Social Drivers of Health     Financial Resource Strain: Not on file   Food Insecurity: Not on file   Transportation Needs: Not on file   Physical Activity: Not on file   Stress: Not on file   Social Connections: Not on file   Intimate Partner Violence: Not on file   Housing Stability: Not on file      Family History   Problem Relation Name Age of Onset    Alzheimer's disease Mother      Prostate cancer Father      Prostate cancer Brother      Alzheimer's disease Maternal Grandmother      Alzheimer's disease Mother's Sister        Patient Health Questionnaire-2 Score: 0          Review of Systems  All other system have been reviewed and are negative for complaint.    Vitals:    02/18/25 1423   BP: 117/76   BP Location: Left arm   Patient Position: Sitting   BP Cuff Size: Adult   Pulse: 104   Resp: 18   Weight: 73 kg (161 lb)     Objective   Neurological Exam  Mental Status  Awake and alert. Oriented  to person, place, time and situation. Recent and remote memory are intact. Speech is normal. Language is fluent with no aphasia. Attention and concentration are normal.    Cranial Nerves  CN II: Visual fields full to confrontation.  CN III, IV, VI: Extraocular movements intact bilaterally. Normal lids and orbits bilaterally.  CN V: Facial sensation is normal.  CN VII: Full and symmetric facial movement.  CN VIII: Hearing is normal.  CN IX, X: Palate elevates symmetrically  CN XI: Shoulder shrug strength is normal.  CN XII: Tongue midline without atrophy or fasciculations.    Motor   Strength is 5/5 throughout all four extremities.    Sensory  Patient reports intact light touch and pinprick in all 4 extremities  However has significantly diminished vibration sensation in the R compared to the left LE.    Reflexes                                            Right                      Left  Brachioradialis                    2+                         2+  Biceps                                 2+                         2+  Patellar                                1+                         3+  Achilles                                1+                         2+  Right Plantar: equivocal  Left Plantar: equivocal    Right pathological reflexes: Suprapatellar absent.  Left pathological reflexes: Suprapatellar present.        MDS UPDRS 1st Score: Motor Examination  Is the patient on medication for treating the symptoms of Parkinson's Disease?: No  Is the patient on Levodopa?: No  Speech: 0  Facial Expression: 2  Rigidty Neck: 4  Rigidty RUE: 0  Rigidity - LUE: 0  Rigidity RLE: 0  Rigidity LLE: 0  Finger Tapping Right Hand: 0  Finger Tapping Left Hand: 1  Hand Movements- Right Hand: 0  Hand Movements- Left Hand: 0  Pronatiaon-Supination Movments - Right Hand: 0  Pronatiaon-Supination Movments Left Hand: 0  Toe Tapping Right Foot: 1  Toe Tapping - Left Foot: 2  Leg Agility - Right Le  Leg Agility - Left le  Arising  from Chair: 1  Gait: 1  Freezing of Gait: 0  Postural Stability: 0  Posture: 4  Global Spontanteity of Movment ( Body Bradykinesia): 1  Postural Tremor - Right Hand: 0  Postural Tremor - Left hand: 2  Kinetic Tremor - Right hand: 0  Kinetic Tremor - Left hand: 1  Rest Tremor Amplitude - RUE: 0  Rest Tremor Amplitude - LUE: 1  Rest Tremor Amplitude - RLE: 0  Rest Tremor Amplitude - LLE: 0  Rest Tremor Amplitude - Lip/Jaw: 0  Constancy of Rest Tremor: 1  MDS UPDRS Total Score: 25  Were dyskinesias (chorea or dystonia) present during examination?: No      Thyroid Stimulating Hormone   Date Value Ref Range Status   12/07/2023 4.23 (H) 0.44 - 3.98 mIU/L Final     7/13/23: MRI IAC with moderate small vessel disease burden     Assessment/Plan   Diagnoses and all orders for this visit:  Parkinsonism, unspecified Parkinsonism type (Multi)  -     MR lumbar spine wo IV contrast; Future  Neuropathy  -     Referral to Physical Therapy; Future      Yayo Fraga Jr. is a 89 y.o. ambidextrous male with hx of Prostate cancer diagnosed 2003, and long QT syndrome s/p pacemaker presenting for evaluation of tremors (left greater than right) with onset around 2022.  Exam is notable for tremor with a rest component that is less than postural component.  Also with left-sided bradykinesia but normal tone.  Also noted asymmetry in reflexes and sensation in the legs (endorses history of right-sided radiculopathy).  Also, patient noted to have neck flexion as rest posture of his head with very limited range of motion with neck extension.    Overall picture is concerning for essential tremor versus parkinson's disease.  With likely unrelated lumbar radiculopathy and cervical dystonia.      The following was discussed  - You have opted to not do additional testing or start any medication at this time  - We you are willing we would do a Sis Scan of the brain to check for parkinson's disease  - We could do an MRI of the lumbar spine address the  differences in the reflexes and sensation  - You alina get a soft neck collar at your local pharmacy to help with stopping the muscles in your neck from pulling your head forward  - We will place a referral to physical therapy for the neck pulling forward   - Come back to follow up 4 months

## 2025-02-18 NOTE — PATIENT INSTRUCTIONS
Pleasure meeting you today. On our exam we noticed a tremor and some slowing that is worse in the left hand compared to the right hand. We also notice some asymmetry in your reflexes and sensation in the legs. This raises the possibility for conditions such as essential tremor or parkinson's disease. Plan is as follows.    - You have opted to not do additional testing or start any medication at this time  - We you are willing we would do a Sis Scan of the brain to check for parkinson's disease  - We could do an MRI of the lumbar spine address the differences in the reflexes and sensation  - You alina get a soft neck collar at your local pharmacy to help with stopping the muscles in your neck from pulling your head forward  - We will place a referral to physical therapy for the neck pulling forward   - Come back to follow up 4 months

## 2025-02-18 NOTE — LETTER
February 18, 2025     Can Feldman MD  [address]  [facility], [address] [address]    Patient: Yayo Fraga Jr.   YOB: 1935   Date of Visit: 2/18/2025       Dear Dr. Can Feldman MD:    Thank you for referring Yayo Fraga to me for evaluation. Below are my notes for this consultation.  If you have questions, please do not hesitate to call me. I look forward to following your patient along with you.       Sincerely,     Jeremy Rolon MD      CC: No Recipients  ______________________________________________________________________________________    Subjective     Yayo Frgaa Jr. is a ambidextrous  89 y.o. year old male who presents with No chief complaint on file.. Patient is accompanied by: spouse  Visit type: new patient visit     Prostate cancer diagnosed 2003, and long QT syndrome s/p pacemaker.  Here for evaluation of L>R tremor associated with action, onset around 2022    Patient reports he is here because he was told he might have parkinson's because he sometimes has tremors. Tremors started around 2022 in the L hand and then eventually in the R hand. No other body part affected. Some difficulties with fine motor skills. 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    Review of Motor symptoms:  Tremor:  Location- L hand> L hand                 Rest/postural/action- Posture and action, spills coffee  Stiffness/rigidity: No  Slowness:  No  Trouble walking:  Denies, uses a cane because of pain from lumbar degeneration. Currently in PT, evaluated by orth that recommended no intervention  Freezing of gait:  No  Balance problems:  Some  Falls:  No  Changes in speech:  No  Swallowing difficulties:  No  Abnormal postures:  No  Handwriting: No micrographia, just untidy  Activities of daily living (buttoning clothes, bathing, cutting food, etc):  Independent    Review of Non-Motor symptoms:  Cognition:  Memory- Unchanged for several years                         Hallucinations-  No                          Mood:         Depression-  No                       Anxiety: No                       Obsessive/compulsive behaviors- Always want to have plate warm before he eats. Requests this at restaurants too. New in the past year.  Sleep disturbances including REM behavior disorder: No  Sensory changes (ie, smell or taste):  No  Cramps/pain:  Rarely  Gastrointestinal complaints/Constipation:  No, takes magnesium every other day   Urinary retention or frequency:  Urgency once in a while but complicated hx with prostate cancer  Positional lightheadedness and/or syncope:  No  Excessive saliva/drooling:  No  Fatigue:  Yes since being on Casodex     Fhx: No hx of tremors or PD      SoHx. Drinks rarely, does not smoke. Retired .     Patient Active Problem List   Diagnosis   • Abnormal blood chemistry level   • Adenocarcinoma of prostate (Multi)   • BPH with obstruction/lower urinary tract symptoms   • Sensorineural hearing loss, bilateral   • Tinnitus of both ears   • Cervical radiculopathy   • Chronic pain   • Mixed hyperlipidemia   • Impotence   • Prolonged QT interval   • Long Q-T syndrome   • Cardiac pacemaker in situ   • Sick sinus syndrome (Multi)   • Subclinical hypothyroidism   • Vitamin D deficiency   • Syncope   • Condition not found   • Tremor of both hands   • Essential tremor   • Impairment of auditory discrimination   • Impacted cerumen   • Diverticular disease   • Other seborrheic keratosis   • Actinic keratosis   • Other pruritus   • Other seborrheic dermatitis   • Neoplasm of uncertain behavior of skin   • Melanocytic nevi of trunk   • Left-sided sensorineural hearing loss   • Stomach upset   • Hemorrhoids   • Constipation   • Need for influenza vaccination   • Need for pneumococcal 20-valent conjugate vaccination   • Benign prostatic hyperplasia with urinary retention      Past Medical History:   Diagnosis Date   • BPH (benign prostatic  hyperplasia)    • Cervical radiculopathy    • Chronic back pain     just completed steriod taper, takes tylenol or Motrin for pain as needed, walks with cane   • Delayed emergence from general anesthesia    • Hemorrhoids    • HLD (hyperlipidemia)    • [place] (hard of hearing)     wears hearing aids   • Hypothyroidism     TSH=4.23 12/7/23   • Impotence    • Long Q-T syndrome    • Lower urinary tract symptoms (LUTS)    • Personal history of other diseases of the digestive system 01/02/2018    History of diverticulosis   • Prostate CA (Multi)    • Sick sinus syndrome (Multi) 01/02/2018    Sick sinus syndrome, has a pacemaker   • Syncope     pre-pacer, no issues since pacemaker   • Tinnitus    • Tremor     Bilateral hands, left worse than right   • Urinary retention     St Caths self 2-3x/day   • Vitamin D deficiency       Past Surgical History:   Procedure Laterality Date   • CARDIAC PACEMAKER PLACEMENT  02/11/2015    Pacemaker Placement   • COLONOSCOPY  02/11/2015    Complete Colonoscopy   • HERNIA REPAIR  02/11/2015    Hernia Repair   • OTHER SURGICAL HISTORY  01/02/2018    Needle Biopsy Of Prostate      Social History     Socioeconomic History   • Marital status:      Spouse name: Not on file   • Number of children: Not on file   • Years of education: Not on file   • Highest education level: Not on file   Occupational History   • Not on file   Tobacco Use   • Smoking status: Never   • Smokeless tobacco: Never   Vaping Use   • Vaping status: Never Used   Substance and Sexual Activity   • Alcohol use: Not Currently     Comment: 1 beer every few weeks   • Drug use: Never   • Sexual activity: Defer   Other Topics Concern   • Not on file   Social History Narrative   • Not on file     Social Drivers of Health     Financial Resource Strain: Not on file   Food Insecurity: Not on file   Transportation Needs: Not on file   Physical Activity: Not on file   Stress: Not on file   Social Connections: Not on file   Intimate  Partner Violence: Not on file   Housing Stability: Not on file      Family History   Problem Relation Name Age of Onset   • Alzheimer's disease Mother     • Prostate cancer Father     • Prostate cancer Brother     • Alzheimer's disease Maternal Grandmother     • Alzheimer's disease Mother's Sister        Patient Health Questionnaire-2 Score: 0          Review of Systems  All other system have been reviewed and are negative for complaint.    Vitals:    02/18/25 1423   BP: 117/76   BP Location: Left arm   Patient Position: Sitting   BP Cuff Size: Adult   Pulse: 104   Resp: 18   Weight: 73 kg (161 lb)     Objective   Neurological Exam  Mental Status  Awake and alert. Oriented to person, place, time and situation. Recent and remote memory are intact. Speech is normal. Language is fluent with no aphasia. Attention and concentration are normal.    Cranial Nerves  CN II: Visual fields full to confrontation.  CN III, IV, VI: Extraocular movements intact bilaterally. Normal lids and orbits bilaterally.  CN V: Facial sensation is normal.  CN VII: Full and symmetric facial movement.  CN VIII: Hearing is normal.  CN IX, X: Palate elevates symmetrically  CN XI: Shoulder shrug strength is normal.  CN XII: Tongue midline without atrophy or fasciculations.    Motor   Strength is 5/5 throughout all four extremities.    Sensory  Patient reports intact light touch and pinprick in all 4 extremities  However has significantly diminished vibration sensation in the R compared to the left LE.    Reflexes                                            Right                      Left  Brachioradialis                    2+                         2+  Biceps                                 2+                         2+  Patellar                                1+                         3+  Achilles                                1+                         2+  Right Plantar: equivocal  Left Plantar: equivocal    Right pathological reflexes:  Suprapatellar absent.  Left pathological reflexes: Suprapatellar present.        MDS UPDRS 1st Score: Motor Examination  Is the patient on medication for treating the symptoms of Parkinson's Disease?: No  Is the patient on Levodopa?: No  Speech: 0  Facial Expression: 2  Rigidty Neck: 4  Rigidty RUE: 0  Rigidity - LUE: 0  Rigidity RLE: 0  Rigidity LLE: 0  Finger Tapping Right Hand: 0  Finger Tapping Left Hand: 1  Hand Movements- Right Hand: 0  Hand Movements- Left Hand: 0  Pronatiaon-Supination Movments - Right Hand: 0  Pronatiaon-Supination Movments Left Hand: 0  Toe Tapping Right Foot: 1  Toe Tapping - Left Foot: 2  Leg Agility - Right Le  Leg Agility - Left le  Arising from Chair: 1  Gait: 1  Freezing of Gait: 0  Postural Stability: 0  Posture: 4  Global Spontanteity of Movment ( Body Bradykinesia): 1  Postural Tremor - Right Hand: 0  Postural Tremor - Left hand: 2  Kinetic Tremor - Right hand: 0  Kinetic Tremor - Left hand: 1  Rest Tremor Amplitude - RUE: 0  Rest Tremor Amplitude - LUE: 1  Rest Tremor Amplitude - RLE: 0  Rest Tremor Amplitude - LLE: 0  Rest Tremor Amplitude - Lip/Jaw: 0  Constancy of Rest Tremor: 1  MDS UPDRS Total Score: 25  Were dyskinesias (chorea or dystonia) present during examination?: No      Thyroid Stimulating Hormone   Date Value Ref Range Status   2023 4.23 (H) 0.44 - 3.98 mIU/L Final     23: MRI IAC with moderate small vessel disease burden     Assessment/Plan   Diagnoses and all orders for this visit:  Parkinsonism, unspecified Parkinsonism type (Multi)  -     MR lumbar spine wo IV contrast; Future  Neuropathy  -     Referral to Physical Therapy; Future      Yayo Fraga Jr. is a 89 y.o. ambidextrous male with hx of Prostate cancer diagnosed , and long QT syndrome s/p pacemaker presenting for evaluation of tremors (left greater than right) with onset around .  Exam is notable for tremor with a rest component that is less than postural component.  Also with  left-sided bradykinesia but normal tone.  Also noted asymmetry in reflexes and sensation in the legs (endorses history of right-sided radiculopathy).  Also, patient noted to have neck flexion as rest posture of his head with very limited range of motion with neck extension.    Overall picture is concerning for essential tremor versus parkinson's disease.  With likely unrelated lumbar radiculopathy and cervical dystonia.      The following was discussed  - You have opted to not do additional testing or start any medication at this time  - We you are willing we would do a Sis Scan of the brain to check for parkinson's disease  - We could do an MRI of the lumbar spine address the differences in the reflexes and sensation  - You alina get a soft neck collar at your local pharmacy to help with stopping the muscles in your neck from pulling your head forward  - We will place a referral to physical therapy for the neck pulling forward   - Come back to follow up 4 months          Attestation signed by Jeremy Rolon MD at 2/18/2025  6:19 PM:  I saw and evaluated the patient. I personally obtained the key and critical portions of the history and physical exam or was physically present for key and critical portions performed by the resident/fellow. I reviewed the resident/fellow's documentation and discussed the patient with the resident/fellow. I agree with the resident/fellow's medical decision making as documented in the note.    He has essential tremor, with some parkinsonism and a slight rest component to the tremor.  It is unclear if this is related to his essential tremor or an early sign of Parkinson's disease.  We offered to do a DaTscan, but he wanted to defer that.  We did recommend MRI of the lumbar spine for some radicular symptoms in the right leg, with complaint of numbness and hyporeflexia.  We offered botulinum toxin or muscle relaxant for the anterocollis, but he preferred to ignore it for now.  Follow-up  in 4 months.

## 2025-02-28 ENCOUNTER — OFFICE VISIT (OUTPATIENT)
Dept: GERIATRIC MEDICINE | Facility: CLINIC | Age: OVER 89
End: 2025-02-28
Payer: MEDICARE

## 2025-02-28 VITALS
DIASTOLIC BLOOD PRESSURE: 77 MMHG | SYSTOLIC BLOOD PRESSURE: 133 MMHG | HEART RATE: 85 BPM | WEIGHT: 163.8 LBS | TEMPERATURE: 96.6 F | BODY MASS INDEX: 21.61 KG/M2 | RESPIRATION RATE: 20 BRPM

## 2025-02-28 DIAGNOSIS — E03.8 SUBCLINICAL HYPOTHYROIDISM: Primary | ICD-10-CM

## 2025-02-28 DIAGNOSIS — M54.12 CERVICAL RADICULOPATHY: ICD-10-CM

## 2025-02-28 DIAGNOSIS — K30 STOMACH UPSET: ICD-10-CM

## 2025-02-28 DIAGNOSIS — K59.00 CONSTIPATION, UNSPECIFIED CONSTIPATION TYPE: ICD-10-CM

## 2025-02-28 DIAGNOSIS — C61 ADENOCARCINOMA OF PROSTATE (MULTI): ICD-10-CM

## 2025-02-28 DIAGNOSIS — R79.9 ABNORMAL BLOOD CHEMISTRY LEVEL: ICD-10-CM

## 2025-02-28 PROCEDURE — 99215 OFFICE O/P EST HI 40 MIN: CPT | Performed by: NURSE PRACTITIONER

## 2025-02-28 PROCEDURE — 1157F ADVNC CARE PLAN IN RCRD: CPT | Performed by: NURSE PRACTITIONER

## 2025-02-28 PROCEDURE — G2212 PROLONG OUTPT/OFFICE VIS: HCPCS | Performed by: NURSE PRACTITIONER

## 2025-02-28 PROCEDURE — 1036F TOBACCO NON-USER: CPT | Performed by: NURSE PRACTITIONER

## 2025-02-28 PROCEDURE — 1160F RVW MEDS BY RX/DR IN RCRD: CPT | Performed by: NURSE PRACTITIONER

## 2025-02-28 PROCEDURE — 1159F MED LIST DOCD IN RCRD: CPT | Performed by: NURSE PRACTITIONER

## 2025-02-28 ASSESSMENT — ENCOUNTER SYMPTOMS
LOSS OF SENSATION IN FEET: 0
CONSTIPATION: 1
OCCASIONAL FEELINGS OF UNSTEADINESS: 0
DEPRESSION: 0
ROS GI COMMENTS: HEMORRHOIDS

## 2025-02-28 NOTE — PROGRESS NOTES
"Subjective   Patient ID: Yayo Fraga Jr. is a 89 y.o. male who presents for Follow-up, URI (Resolving), and Prostate Cancer (Treatment questiions).  Yayo Fraga Jr. is an 89 y.o. male who presents today for a geriatric medicine and primary care follow-up. Their last visit was 9/27/2024. Today, patient with Follow-up, URI (Resolving), and Prostate Cancer (Treatment questions). Today, pt came alone so the history is as accurate as and limited by his own report/memory. Patient reports he is just getting over a cold from nearly 2 weeks ago, starting to resolve, still with some congestion. Requesting some \"type of chemistry to get rid of lingering congestion\". Agreeable to trying Mucinex. Reports having green laser procedure by Dr. Mcgarry just before Yari. States he is still experiencing some soreness, pain with bowel movements. Has follow-up with Burt Arroyo MD, 3/14/2025, and needs to complete labs before that time, requested printout of requisitions to expedite process. Re-entered lab orders so that they are for [facility] and printed [facility] requisitions for patient's convenience. Dr. Arroyo was informed results would be routed to his attention. Patient was encouraged to inform Dr. Mcgarry of his continued prostate discomfort, will have follow-up 4/9/2025. Reports he was found to have pinched nerves L2 and L3. Followed by chiropractor and PT, land and water at [facility]. Does not feel chiropractor has helped as much as PT has, but will continue with all therapy. Last thyroid check 12/07/2023, Lab Results TSH 4.23 (H).    Previous Visit Note: 9/27/2024   Yayo Fraga Jr. is an 89 y.o.  male who presents for urgent care visit follow-up. He is accompanied by his wife who is his primary historian. He had c/o abdominal pain/upset lower stomach-below umbilical area/nausea w/loss of appetite-cramping/twisted towel feeling/hot flashes-BM normal but smaller. No diarrhea, no blood in stools, " increased belching, denies constipation. No vomiting. Spouse without similar sx. Remote hx of inguinal hernia repair, diverticulosis. Hx of prostate Ca, seen urology last Friday neg post void, urinalysis not done. No dysuria or hematuria. No flank pain. Denies constipation. Positive flatus. Concern for possible partial SBO/ileus from urgent imaging. Prescribed hyoscyamine (Levsin/SL) 0.125 mg disintegrating tablet; Take 1 tablet (0.125 mg) by mouth 4 times a day as needed (for abdomen discomfort) for up to 3 days. Patient has weaned himself from med at recommendation of DTR who is also NP. He remains without bothersome symptoms. He advanced diet last night with Raul's meal that it did tolerate well. Wanting recommendations on what to eat going forward. Agreeable to receiving influenza vaccine. Of note, patient shared/wife confirmed he does not drink very much water, 4 glasses a day.      Since last visit:  12/13/2024: Urology: Jorge Mcgarry MD   [place] Laser Enucleation Transurethral Prostate W/MORCELLATION   Benign prostatic hyperplasia with urinary retention [N40.1, R33.8]   Preoperative Prostate Size:  approx 60 cubic centimeters.     Prostate configuration: trilobar  01/09/25 - PVR 10cc Emptying great, cath free. Stream is stronger, no leakage. Some soreness around the prostate area but manageable.     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 Arroyo, declined  additional therapy and remained on Casodex alone, PSA response, rafael 1.13 11/23  Current Outpatient Medications on File Prior to Visit   Medication Sig Dispense Refill   • acetaminophen (Tylenol 8 HOUR) 650 mg ER tablet Take 1 tablet (650 mg) by mouth every 8 hours if needed for mild pain (1 - 3). Do not crush, chew, or split.     • alfuzosin (Uroxatral) 10 mg 24 hr tablet Take 1 tablet (10 mg) by mouth once daily. (Patient not taking: Reported on 12/24/2024) 90 tablet 3   • bicalutamide (Casodex) 50 mg tablet Take 1 tablet (50 mg total) by mouth once daily. 30 tablet 11   • cholecalciferol (Vitamin D-3) 50 mcg (2,000 unit) capsule Take 1 capsule (50 mcg) by mouth once daily.     • finasteride (Proscar) 5 mg tablet Take 1 tablet (5 mg) by mouth once daily. (Patient not taking: Reported on 12/24/2024) 90 tablet 3   • fish oil concentrate (Omega-3) 120-180 mg capsule Take 1 capsule (1 g) by mouth every other day.     • gabapentin (Neurontin) 300 mg capsule Take 1 capsule (300 mg) by mouth 2 times a day as needed (Nerve pain) for up to 14 days. 28 capsule 0   • ibuprofen 200 mg tablet Take 2 tablets (400 mg) by mouth every 6 hours if needed for mild pain (1 - 3). (Patient not taking: Reported on 12/24/2024)     • LYCOPENE ORAL once daily.     • magnesium oxide 500 mg magnesium tablet Take 1 tablet (500 mg) by mouth every other day.     • oxyCODONE (Roxicodone) 5 mg immediate release tablet Take 1 tablet (5 mg) by mouth every 6 hours if needed for severe pain (7 - 10). (Patient not taking: Reported on 12/24/2024) 5 tablet 0   • phenazopyridine (Pyridium) 200 mg tablet Take 1 tablet (200 mg) by mouth 3 times a day as needed for bladder spasms. (Patient not taking: Reported on 12/24/2024) 10 tablet 0   • phenazopyridine (Pyridium) 200 mg tablet Take 1 tablet (200 mg) by mouth 3 times a day as needed for bladder spasms. (Patient not taking: Reported on 12/24/2024) 10 tablet 0   • simvastatin (Zocor) 40 mg tablet TAKE 1  TABLET BY MOUTH EVERY DAY 90 tablet 3     No current facility-administered medications on file prior to visit.     Patient Active Problem List    Diagnosis Date Noted   • Stomach upset 10/01/2024   • Hemorrhoids 10/01/2024   • Constipation 10/01/2024   • Need for influenza vaccination 10/01/2024   • Need for pneumococcal 20-valent conjugate vaccination 10/01/2024   • Impacted cerumen 09/27/2024   • Diverticular disease 09/27/2024   • Tremor of both hands 06/05/2024   • Essential tremor 06/05/2024   • Syncope 10/02/2023   • Condition not found 10/02/2023   • Abnormal blood chemistry level 05/22/2023   • Adenocarcinoma of prostate (Multi) 05/22/2023   • BPH with obstruction/lower urinary tract symptoms 05/22/2023   • Tinnitus of both ears 05/22/2023   • Cervical radiculopathy 05/22/2023   • Chronic pain 05/22/2023   • Mixed hyperlipidemia 05/22/2023   • Impotence 05/22/2023   • Prolonged QT interval 05/22/2023   • Sick sinus syndrome (Multi) 05/22/2023   • Subclinical hypothyroidism 05/22/2023   • Vitamin D deficiency 05/22/2023   • Other pruritus 05/03/2023   • Other seborrheic dermatitis 05/03/2023   • Neoplasm of uncertain behavior of skin 01/27/2017   • Cardiac pacemaker in situ 01/09/2017   • Other seborrheic keratosis 03/16/2016   • Actinic keratosis 03/16/2016   • Melanocytic nevi of trunk 03/16/2016   • Long Q-T syndrome 01/11/2016   • Sensorineural hearing loss, bilateral 09/23/2014   • Impairment of auditory discrimination 09/23/2014   • Left-sided sensorineural hearing loss 09/23/2014   • Benign prostatic hyperplasia with urinary retention 11/21/2024     Past Medical History:   Diagnosis Date   • BPH (benign prostatic hyperplasia)    • Cervical radiculopathy    • Chronic back pain     just completed steriod taper, takes tylenol or Motrin for pain as needed, walks with cane   • Delayed emergence from general anesthesia    • Hemorrhoids    • HLD (hyperlipidemia)    • [place] (hard of hearing)     wears hearing  aids   • Hypothyroidism     TSH=4.23 12/7/23   • Impotence    • Long Q-T syndrome    • Lower urinary tract symptoms (LUTS)    • Personal history of other diseases of the digestive system 01/02/2018    History of diverticulosis   • Prostate CA (Multi)    • Sick sinus syndrome (Multi) 01/02/2018    Sick sinus syndrome, has a pacemaker   • Syncope     pre-pacer, no issues since pacemaker   • Tinnitus    • Tremor     Bilateral hands, left worse than right   • Urinary retention     St Caths self 2-3x/day   • Vitamin D deficiency      Past Surgical History:   Procedure Laterality Date   • CARDIAC PACEMAKER PLACEMENT  02/11/2015    Pacemaker Placement   • COLONOSCOPY  02/11/2015    Complete Colonoscopy   • HERNIA REPAIR  02/11/2015    Hernia Repair   • OTHER SURGICAL HISTORY  01/02/2018    Needle Biopsy Of Prostate       Review of Systems   Gastrointestinal:  Positive for constipation.        Hemorrhoids       Objective   Visit Vitals  /77 (BP Location: Right arm, Patient Position: Sitting, BP Cuff Size: Adult)   Pulse 85   Temp 35.9 °C (96.6 °F) (Tympanic)   Resp 20 Comment: 91%   Wt 74.3 kg (163 lb 12.8 oz)   BMI 21.61 kg/m²   Smoking Status Never   BSA 1.96 m²       Physical Exam  Vitals (98/64, likely d/t dehydration) reviewed.   Constitutional:       Appearance: Normal appearance. He is normal weight.   HENT:      Head: Normocephalic.   Eyes:      Conjunctiva/sclera: Conjunctivae normal.   Cardiovascular:      Rate and Rhythm: Normal rate and regular rhythm.      Heart sounds: Normal heart sounds.   Neurological:      Mental Status: He is alert.   Psychiatric:         Mood and Affect: Mood normal.         Behavior: Behavior normal.         Thought Content: Thought content normal.         Judgment: Judgment normal.       Assessment/Plan     Problem List Items Addressed This Visit             ICD-10-CM    Abnormal blood chemistry level R79.9    Relevant Orders    Vitamin B12    Vitamin B12    Follow Up In  Geriatrics    Adenocarcinoma of prostate (Multi) C61    Relevant Orders    Testosterone    Comprehensive Metabolic Panel    CBC and Auto Differential    Prostate Specific Antigen    Follow Up In Geriatrics    Cervical radiculopathy M54.12    Relevant Orders    Follow Up In Geriatrics    Subclinical hypothyroidism - Primary E03.8    Relevant Orders    Tsh With Reflex To Free T4 If Abnormal    Tsh With Reflex To Free T4 If Abnormal    Follow Up In Geriatrics    Stomach upset K30    Constipation K59.00    Relevant Orders    Follow Up In Geriatrics         Time Spent  Prep time on day of patient encounter: 10 minutes          Mabel Cat, APRN-CNP      Follow Up In Geriatrics     1. Stomach upset    2. Subclinical hypothyroidism (Primary)  - Tsh With Reflex To Free T4 If Abnormal; Future  - Thyroxine, Free    3. Constipation, unspecified constipation type  - Follow Up In Geriatrics; Future    4. Cervical radiculopathy  Pinch nerve L2, L3, Continue PT, chiropractor; Leo [facility]  - Follow Up In Geriatrics; Future    5. Abnormal blood chemistry level  - Vitamin B12; Future  - Follow Up In Geriatrics; Future    6. Adenocarcinoma of prostate (Multi)  Urology - Green laser Continue follow-up with oncology and urology  - Testosterone; Future  - Comprehensive Metabolic Panel; Future  - CBC and Auto Differential; Future  - Prostate Specific Antigen; Future  - Follow Up In Geriatrics; Future         Time Spent    Prep time on day of patient encounter 22 minutes  Time spent directly with patient, family or caregiver 65 minutes  Additional Time Spent on Patient Care Activities 6 minutesAdditional Time Spent on Patient Care Activities. 6 minutes. The comment is Ordering labs. Taken on 2/28/25 0827  Documentation Time 28 minutes  Other Time Spent 0 minutes  Total 121 minutes          Mabel Cat, CORNEL-CNP

## 2025-02-28 NOTE — PATIENT INSTRUCTIONS
Follow-up in 6 months     Cold virus - Mucinex (regular) 600 mg every 12 hours     Urology - Green laser Continue follow-up with oncology and urology    Pinch nerve L2, L3, Continue PT, chiropractor; Leo [facility]

## 2025-03-07 ENCOUNTER — TELEPHONE (OUTPATIENT)
Dept: HEMATOLOGY/ONCOLOGY | Facility: HOSPITAL | Age: OVER 89
End: 2025-03-07
Payer: MEDICARE

## 2025-03-07 ENCOUNTER — LAB (OUTPATIENT)
Dept: LAB | Facility: HOSPITAL | Age: OVER 89
End: 2025-03-07
Payer: MEDICARE

## 2025-03-07 DIAGNOSIS — C61 ADENOCARCINOMA OF PROSTATE (MULTI): ICD-10-CM

## 2025-03-07 LAB
ALBUMIN SERPL BCP-MCNC: 4.4 G/DL (ref 3.4–5)
ALP SERPL-CCNC: 68 U/L (ref 33–136)
ALT SERPL W P-5'-P-CCNC: 8 U/L (ref 10–52)
ANION GAP SERPL CALC-SCNC: 14 MMOL/L (ref 10–20)
AST SERPL W P-5'-P-CCNC: 18 U/L (ref 9–39)
BASOPHILS # BLD AUTO: 0.08 X10*3/UL (ref 0–0.1)
BASOPHILS NFR BLD AUTO: 0.9 %
BILIRUB SERPL-MCNC: 1.1 MG/DL (ref 0–1.2)
BUN SERPL-MCNC: 24 MG/DL (ref 6–23)
CALCIUM SERPL-MCNC: 9.2 MG/DL (ref 8.6–10.3)
CHLORIDE SERPL-SCNC: 101 MMOL/L (ref 98–107)
CO2 SERPL-SCNC: 30 MMOL/L (ref 21–32)
CREAT SERPL-MCNC: 1.28 MG/DL (ref 0.5–1.3)
EGFRCR SERPLBLD CKD-EPI 2021: 53 ML/MIN/1.73M*2
EOSINOPHIL # BLD AUTO: 0.34 X10*3/UL (ref 0–0.4)
EOSINOPHIL NFR BLD AUTO: 3.7 %
ERYTHROCYTE [DISTWIDTH] IN BLOOD BY AUTOMATED COUNT: 13.1 % (ref 11.5–14.5)
GLUCOSE SERPL-MCNC: 101 MG/DL (ref 74–99)
HCT VFR BLD AUTO: 45 % (ref 41–52)
HGB BLD-MCNC: 14.8 G/DL (ref 13.5–17.5)
IMM GRANULOCYTES # BLD AUTO: 0.03 X10*3/UL (ref 0–0.5)
IMM GRANULOCYTES NFR BLD AUTO: 0.3 % (ref 0–0.9)
LYMPHOCYTES # BLD AUTO: 3.19 X10*3/UL (ref 0.8–3)
LYMPHOCYTES NFR BLD AUTO: 34.9 %
MCH RBC QN AUTO: 29.5 PG (ref 26–34)
MCHC RBC AUTO-ENTMCNC: 32.9 G/DL (ref 32–36)
MCV RBC AUTO: 90 FL (ref 80–100)
MONOCYTES # BLD AUTO: 0.79 X10*3/UL (ref 0.05–0.8)
MONOCYTES NFR BLD AUTO: 8.7 %
NEUTROPHILS # BLD AUTO: 4.7 X10*3/UL (ref 1.6–5.5)
NEUTROPHILS NFR BLD AUTO: 51.5 %
NRBC BLD-RTO: 0 /100 WBCS (ref 0–0)
PLATELET # BLD AUTO: 218 X10*3/UL (ref 150–450)
POTASSIUM SERPL-SCNC: 3.9 MMOL/L (ref 3.5–5.3)
PROT SERPL-MCNC: 7.5 G/DL (ref 6.4–8.2)
PSA SERPL-MCNC: 4.59 NG/ML
RBC # BLD AUTO: 5.02 X10*6/UL (ref 4.5–5.9)
SODIUM SERPL-SCNC: 141 MMOL/L (ref 136–145)
T4 FREE SERPL-MCNC: 1.1 NG/DL (ref 0.78–1.48)
TESTOST SERPL-MCNC: 886 NG/DL (ref 240–1000)
TSH SERPL-ACNC: 5.75 MIU/L (ref 0.44–3.98)
VIT B12 SERPL-MCNC: 488 PG/ML (ref 211–911)
WBC # BLD AUTO: 9.1 X10*3/UL (ref 4.4–11.3)

## 2025-03-07 PROCEDURE — 84153 ASSAY OF PSA TOTAL: CPT

## 2025-03-07 PROCEDURE — 82607 VITAMIN B-12: CPT | Performed by: NURSE PRACTITIONER

## 2025-03-07 PROCEDURE — 85025 COMPLETE CBC W/AUTO DIFF WBC: CPT

## 2025-03-07 PROCEDURE — 36415 COLL VENOUS BLD VENIPUNCTURE: CPT

## 2025-03-07 PROCEDURE — 84439 ASSAY OF FREE THYROXINE: CPT | Performed by: NURSE PRACTITIONER

## 2025-03-07 PROCEDURE — 84403 ASSAY OF TOTAL TESTOSTERONE: CPT

## 2025-03-07 PROCEDURE — 84443 ASSAY THYROID STIM HORMONE: CPT | Performed by: NURSE PRACTITIONER

## 2025-03-07 PROCEDURE — 80053 COMPREHEN METABOLIC PANEL: CPT

## 2025-03-14 ENCOUNTER — OFFICE VISIT (OUTPATIENT)
Dept: HEMATOLOGY/ONCOLOGY | Facility: HOSPITAL | Age: OVER 89
End: 2025-03-14
Payer: MEDICARE

## 2025-03-14 VITALS
OXYGEN SATURATION: 100 % | RESPIRATION RATE: 16 BRPM | WEIGHT: 162.3 LBS | BODY MASS INDEX: 21.41 KG/M2 | SYSTOLIC BLOOD PRESSURE: 111 MMHG | TEMPERATURE: 95.9 F | DIASTOLIC BLOOD PRESSURE: 67 MMHG | HEART RATE: 87 BPM

## 2025-03-14 DIAGNOSIS — C61 ADENOCARCINOMA OF PROSTATE (MULTI): ICD-10-CM

## 2025-03-14 DIAGNOSIS — M85.89 OSTEOPENIA OF MULTIPLE SITES: ICD-10-CM

## 2025-03-14 PROCEDURE — 99214 OFFICE O/P EST MOD 30 MIN: CPT | Performed by: INTERNAL MEDICINE

## 2025-03-14 PROCEDURE — 1157F ADVNC CARE PLAN IN RCRD: CPT | Performed by: INTERNAL MEDICINE

## 2025-03-14 PROCEDURE — 1126F AMNT PAIN NOTED NONE PRSNT: CPT | Performed by: INTERNAL MEDICINE

## 2025-03-14 PROCEDURE — 1159F MED LIST DOCD IN RCRD: CPT | Performed by: INTERNAL MEDICINE

## 2025-03-14 ASSESSMENT — PAIN SCALES - GENERAL: PAINLEVEL_OUTOF10: 0-NO PAIN

## 2025-03-14 NOTE — PROGRESS NOTES
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 Arroyo, declined additional therapy and remained on Casodex  alone, PSA response, rafael 1.13 11/23     Provider Team  Burt Arroyo MD Thomas J King, MD Oliver Anchetta Sue Flick  PCP - Lupis Valente  EP / Cardiology f/u   Urology [facility]   Alex DAHL/ Jorge Mcgarry - urology     Other contributing history  -Hypothyroidism, sick sinus syndrome, pacemaker, osteopenia, long qt syndrome, BPH / LUTS urinary incontinence ( finasteride, alfuzosin), CIC, Holep 12/24        Interval history:  The patient presents for follow up.  Physical therapy / back pain is slowly improving.  He also is no longer taking the Proscar on a regular basis.  His appetite and energy is otherwise well.  He denies any new worsening symptoms.  Urinary symptoms are fairly stable.  Denies any ongoing issues with nausea, vomiting, fevers, chills.  His daughter was there during the visit.    ROS:  10-pt ROS reviewed and negative except as mentioned above.    Medications: below was reviewed and is accurate  Current Outpatient Medications   Medication Instructions    acetaminophen (TYLENOL 8 HOUR) 650 mg, Every 8 hours PRN    bicalutamide (CASODEX) 50 mg, oral, Daily    cholecalciferol (Vitamin D-3) 50 mcg (2,000 unit) capsule 1 capsule, Daily    fish  oil concentrate (Omega-3) 120-180 mg capsule Take 1 capsule (1 g) by mouth every other day.    gabapentin (NEURONTIN) 300 mg, oral, 2 times daily PRN    LYCOPENE ORAL once daily.    magnesium oxide 500 mg magnesium tablet 1 tablet, Every other day    simvastatin (Zocor) 40 mg tablet TAKE 1 TABLET BY MOUTH EVERY DAY        Detailed family history and social history reviewed in detail and updated per epic charting and in detail with the patient    Physical exam:  Vitals:    03/14/25 0902   BP: 111/67   BP Location: Left arm   Patient Position: Sitting   BP Cuff Size: Adult   Pulse: 87   Resp: 16   Temp: 35.5 °C (95.9 °F)   TempSrc: Temporal   SpO2: 100%   Weight: 73.6 kg (162 lb 4.8 oz)         GEN: NAD, well-appearing  SKIN: no concerning new lesions examined in upper / lower extremity   LUNGS: CTA  CV: RRR no clear R/G  ABD: Soft, NTND. No rebound or guarding.  EXT:  trace edema bilaterally   NEURO: Grossly intact. No focal deficits.  Does use a cane  PSYCH: Normal mood and affect      Radiology review  Reviewed in detail personally and for detail see results in EPIC  Last scans 2021    Genomics Data    Laboratory review  Reviewed in detail personally and for detail see results in EPIC  .  Lab Results   Component Value Date    WBC 9.1 03/07/2025    HGB 14.8 03/07/2025    HCT 45.0 03/07/2025    MCV 90 03/07/2025     03/07/2025     Lab Results   Component Value Date    GLUCOSE 101 (H) 03/07/2025    CALCIUM 9.2 03/07/2025     03/07/2025    K 3.9 03/07/2025    CO2 30 03/07/2025     03/07/2025    BUN 24 (H) 03/07/2025    CREATININE 1.28 03/07/2025     Lab Results   Component Value Date    PSA 4.59 (H) 03/07/2025    PSA 1.90 11/11/2024    PSA 1.77 08/19/2024     Lab Results   Component Value Date    ALT 8 (L) 03/07/2025    AST 18 03/07/2025    ALKPHOS 68 03/07/2025    BILITOT 1.1 03/07/2025     Lab Results   Component Value Date    TESTOSTERONE 886 03/07/2025         ASSESSMENT AND PLAN     Prostate  Cancer -PSA continues to rise all options discussed in detail with the patient, could be rise also since going off the Proscar but concerned about progressive disease as he is just on Casodex.  He has refused any intervention with ADT in the past.  All options discussed in detail risk benefits and alternatives.  He at this point is not interested in further workup and is aware the risk of progression.  He wants to keep a follow-up in roughly 3 months with labs prior and potential scans in the future.    CT findings 2021 pulmonary / pancreas findings - role of repeat imaging discussed and he wanted to hold off for now    LUTS -continue follow-up with urology significant improvement post HoLEP and has titrated off some of the medications.    DEXA / osteopenia - prior seen in 2018, 10 yr major 6.2 / hip 2.4, he was agreeable to get another bone mineral density prior to his next visit.      Burt Arroyo MD  Senior Attending Physician/  in Medicine [facility] [employer] [facility]  [facility] / [facility]  Patient line [phone]  Fax [phone]

## 2025-03-19 DIAGNOSIS — E03.9 HYPOTHYROIDISM, UNSPECIFIED TYPE: Primary | ICD-10-CM

## 2025-03-19 PROBLEM — H25.13 NUCLEAR SCLEROSIS OF BOTH EYES: Status: ACTIVE | Noted: 2025-03-05

## 2025-03-19 PROBLEM — H40.003 GLAUCOMA SUSPECT OF BOTH EYES: Status: ACTIVE | Noted: 2025-03-05

## 2025-03-19 PROBLEM — H40.053 BILATERAL OCULAR HYPERTENSION: Status: ACTIVE | Noted: 2025-03-05

## 2025-03-19 PROBLEM — H04.123 DRY EYES, BILATERAL: Status: ACTIVE | Noted: 2025-03-05

## 2025-03-19 RX ORDER — LEVOTHYROXINE SODIUM 25 UG/1
25 TABLET ORAL DAILY
Qty: 30 TABLET | Refills: 11 | Status: SHIPPED | OUTPATIENT
Start: 2025-03-19 | End: 2026-03-19

## 2025-03-19 RX ORDER — LATANOPROST 50 UG/ML
1 SOLUTION/ DROPS OPHTHALMIC NIGHTLY
COMMUNITY
Start: 2025-03-05 | End: 2026-03-05

## 2025-03-24 DIAGNOSIS — Z95.0 PACEMAKER: Primary | ICD-10-CM

## 2025-03-24 DIAGNOSIS — R55 SYNCOPE AND COLLAPSE: ICD-10-CM

## 2025-03-25 NOTE — TELEPHONE ENCOUNTER
Noted.   Pt states he has been on metoprolol consistently, he occasionally misses a dose.  He dose need a refill.  Breanna OHN, RN

## 2025-04-02 ENCOUNTER — HOSPITAL ENCOUNTER (OUTPATIENT)
Dept: CARDIOLOGY | Facility: HOSPITAL | Age: OVER 89
Discharge: HOME | End: 2025-04-02
Payer: MEDICARE

## 2025-04-02 DIAGNOSIS — R55 SYNCOPE AND COLLAPSE: ICD-10-CM

## 2025-04-02 DIAGNOSIS — Z95.0 PACEMAKER: ICD-10-CM

## 2025-04-02 DIAGNOSIS — R00.1 BRADYCARDIA ON ECG: ICD-10-CM

## 2025-04-02 DIAGNOSIS — Z95.0 PACEMAKER: Primary | ICD-10-CM

## 2025-04-02 PROCEDURE — 93280 PM DEVICE PROGR EVAL DUAL: CPT

## 2025-04-09 ENCOUNTER — APPOINTMENT (OUTPATIENT)
Dept: UROLOGY | Facility: HOSPITAL | Age: OVER 89
End: 2025-04-09
Payer: MEDICARE

## 2025-04-17 ENCOUNTER — APPOINTMENT (OUTPATIENT)
Dept: UROLOGY | Facility: CLINIC | Age: OVER 89
End: 2025-04-17
Payer: MEDICARE

## 2025-05-06 ENCOUNTER — HOSPITAL ENCOUNTER (OUTPATIENT)
Dept: RADIOLOGY | Facility: CLINIC | Age: OVER 89
Discharge: HOME | End: 2025-05-06
Payer: MEDICARE

## 2025-05-06 DIAGNOSIS — C61 ADENOCARCINOMA OF PROSTATE (MULTI): ICD-10-CM

## 2025-05-06 DIAGNOSIS — M85.89 OSTEOPENIA OF MULTIPLE SITES: ICD-10-CM

## 2025-05-06 PROCEDURE — 77080 DXA BONE DENSITY AXIAL: CPT | Performed by: RADIOLOGY

## 2025-05-06 PROCEDURE — 77080 DXA BONE DENSITY AXIAL: CPT

## 2025-05-08 ENCOUNTER — TELEPHONE (OUTPATIENT)
Dept: HEMATOLOGY/ONCOLOGY | Facility: CLINIC | Age: OVER 89
End: 2025-05-08
Payer: MEDICARE

## 2025-05-08 NOTE — TELEPHONE ENCOUNTER
Attempted to call  Busy on phone number  Please call office to discuss [phone]  Message sent in result management

## 2025-05-14 DIAGNOSIS — E03.9 HYPOTHYROIDISM, UNSPECIFIED TYPE: ICD-10-CM

## 2025-05-23 ENCOUNTER — APPOINTMENT (OUTPATIENT)
Dept: UROLOGY | Facility: HOSPITAL | Age: OVER 89
End: 2025-05-23
Payer: MEDICARE

## 2025-06-04 ENCOUNTER — APPOINTMENT (OUTPATIENT)
Dept: UROLOGY | Facility: HOSPITAL | Age: OVER 89
End: 2025-06-04
Payer: MEDICARE

## 2025-06-10 ENCOUNTER — APPOINTMENT (OUTPATIENT)
Dept: NEUROLOGY | Facility: CLINIC | Age: OVER 89
End: 2025-06-10
Payer: MEDICARE

## 2025-06-20 ENCOUNTER — APPOINTMENT (OUTPATIENT)
Dept: HEMATOLOGY/ONCOLOGY | Facility: HOSPITAL | Age: OVER 89
End: 2025-06-20
Payer: MEDICARE

## 2025-06-23 NOTE — PROGRESS NOTES
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 Arroyo, declined additional therapy and remained on Casodex  alone, PSA response, rafael 1.13 11/23  Refusing escalation    Provider Team  Burt Arroyo MD Thomas J King, MD Oliver Anchetta Sue Flick  PCP - Lupis Valente  EP / Cardiology f/u   Urology [facility]rera [facility]ont[facility]   Alex DAHL/ Jorge Mcgarry - urology     Other contributing history  -Hypothyroidism, sick sinus syndrome, pacemaker, osteopenia, long qt syndrome, BPH / LUTS urinary incontinence ( finasteride, alfuzosin), CIC, Holep 12/24        Interval history:  The patient presents for follow up.  The patient is without any other major new symptoms he is accompanied by his daughter denies any ongoing issues with nausea, vomiting, fevers, chills, easy bruising or bleeding denies any issues with chest pain or chest tightness appetite and energy as well.    ROS:  10-pt ROS reviewed and negative except as mentioned above.    Medications: below was reviewed and is accurate  Current Outpatient Medications   Medication Instructions    acetaminophen (TYLENOL 8 HOUR) 650 mg, Every 8 hours PRN    bicalutamide (CASODEX) 50 mg, oral, Daily    cholecalciferol (Vitamin D-3) 50 mcg (2,000 unit) capsule 1 capsule, Daily    fish oil concentrate (Omega-3) 120-180 mg capsule Take 1 capsule  "(1 g) by mouth every other day.    gabapentin (NEURONTIN) 300 mg, oral, 2 times daily PRN    latanoprost (Xalatan) 0.005 % ophthalmic solution 1 drop, Nightly    levothyroxine (SYNTHROID, LEVOXYL) 25 mcg, oral, Daily, Take on an empty stomach at the same time each day, either 30 to 60 minutes prior to breakfast    LYCOPENE ORAL once daily.    magnesium oxide 500 mg magnesium tablet 1 tablet, Every other day    simvastatin (Zocor) 40 mg tablet TAKE 1 TABLET BY MOUTH EVERY DAY        Detailed family history and social history reviewed in detail and updated per epic charting and in detail with the patient    Physical exam:  Vitals:    06/27/25 0908   BP: 93/62   BP Location: Right arm   Patient Position: Sitting   BP Cuff Size: Adult   Pulse: 66   Resp: 18   Temp: 35.9 °C (96.6 °F)   TempSrc: Temporal   SpO2: 94%   Weight: 73.4 kg (161 lb 13.1 oz)   Height: (S) 1.804 m (5' 11.02\")           GEN: NAD, well-appearing  SKIN: no concerning new lesions examined in upper / lower extremity   LUNGS: CTA  CV: RRR no clear R/G  ABD: Soft, NTND. No rebound or guarding.  EXT:  trace edema bilaterally   NEURO: Grossly intact. No focal deficits.  Does use a cane  PSYCH: Normal mood and affect      Radiology review  Reviewed in detail personally and for detail see results in EPIC  Last scans 2021  DEXA 5/25 some improvement / 10 yr major 4.3/ hip 1.8    Genomics Data    Laboratory review  Reviewed in detail personally and for detail see results in EPIC  .  Lab Results   Component Value Date    WBC 9.7 06/24/2025    HGB 14.5 06/24/2025    HCT 45.1 06/24/2025    MCV 92 06/24/2025     06/24/2025     Lab Results   Component Value Date    GLUCOSE 101 (H) 06/24/2025    CALCIUM 9.2 06/24/2025     06/24/2025    K 4.2 06/24/2025    CO2 30 06/24/2025     06/24/2025    BUN 26 (H) 06/24/2025    CREATININE 1.35 (H) 06/24/2025     Lab Results   Component Value Date    PSA 5.27 (H) 06/24/2025    PSA 4.59 (H) 03/07/2025    PSA " 1.90 11/11/2024     Lab Results   Component Value Date    ALT 9 (L) 06/24/2025    AST 18 06/24/2025    ALKPHOS 58 06/24/2025    BILITOT 1.7 (H) 06/24/2025     Lab Results   Component Value Date    TESTOSTERONE 835 06/24/2025         ASSESSMENT AND PLAN     Prostate Cancer -see prior notes, minimal increase in PSA, all options discussed in detail and decision at this point to continue to monitor closely and aware of the risk of progression but wants to continue on Casodex will arrange for follow-up in 4 months with labs prior and he will continue to follow-up with me in my new practice he will contact us for any other new complaints.  Hold off on repeat imaging.    CT findings 2021 pulmonary / pancreas findings - role of repeat imaging discussed and he wanted to hold off for now    LUTS -continue follow-up with urology significant improvement post HoLEP and has titrated off some of the medications and continues to show improvement.    DEXA / osteopenia - prior seen in 2018, some improvement, next DEXA 5/2027    Borderline elevated creatinine and borderline bilirubin-monitor closely repeat in 3 weeks.    Burt Arroyo MD  Senior Attending Physician/  in Medicine [facility] [employer] [facility]  [facility] / [facility]  Patient line [phone]  Fax [phone]

## 2025-06-24 ENCOUNTER — LAB (OUTPATIENT)
Dept: LAB | Facility: HOSPITAL | Age: OVER 89
End: 2025-06-24
Payer: MEDICARE

## 2025-06-24 DIAGNOSIS — C61 ADENOCARCINOMA OF PROSTATE (MULTI): ICD-10-CM

## 2025-06-24 LAB
ALBUMIN SERPL BCP-MCNC: 4.3 G/DL (ref 3.4–5)
ALP SERPL-CCNC: 58 U/L (ref 33–136)
ALT SERPL W P-5'-P-CCNC: 9 U/L (ref 10–52)
ANION GAP SERPL CALC-SCNC: 12 MMOL/L (ref 10–20)
AST SERPL W P-5'-P-CCNC: 18 U/L (ref 9–39)
BASOPHILS # BLD AUTO: 0.07 X10*3/UL (ref 0–0.1)
BASOPHILS NFR BLD AUTO: 0.7 %
BILIRUB SERPL-MCNC: 1.7 MG/DL (ref 0–1.2)
BUN SERPL-MCNC: 26 MG/DL (ref 6–23)
CALCIUM SERPL-MCNC: 9.2 MG/DL (ref 8.6–10.3)
CHLORIDE SERPL-SCNC: 102 MMOL/L (ref 98–107)
CO2 SERPL-SCNC: 30 MMOL/L (ref 21–32)
CREAT SERPL-MCNC: 1.35 MG/DL (ref 0.5–1.3)
EGFRCR SERPLBLD CKD-EPI 2021: 50 ML/MIN/1.73M*2
EOSINOPHIL # BLD AUTO: 0.27 X10*3/UL (ref 0–0.4)
EOSINOPHIL NFR BLD AUTO: 2.8 %
ERYTHROCYTE [DISTWIDTH] IN BLOOD BY AUTOMATED COUNT: 13.5 % (ref 11.5–14.5)
GLUCOSE SERPL-MCNC: 101 MG/DL (ref 74–99)
HCT VFR BLD AUTO: 45.1 % (ref 41–52)
HGB BLD-MCNC: 14.5 G/DL (ref 13.5–17.5)
IMM GRANULOCYTES # BLD AUTO: 0.05 X10*3/UL (ref 0–0.5)
IMM GRANULOCYTES NFR BLD AUTO: 0.5 % (ref 0–0.9)
LYMPHOCYTES # BLD AUTO: 3.14 X10*3/UL (ref 0.8–3)
LYMPHOCYTES NFR BLD AUTO: 32.3 %
MCH RBC QN AUTO: 29.5 PG (ref 26–34)
MCHC RBC AUTO-ENTMCNC: 32.2 G/DL (ref 32–36)
MCV RBC AUTO: 92 FL (ref 80–100)
MONOCYTES # BLD AUTO: 0.96 X10*3/UL (ref 0.05–0.8)
MONOCYTES NFR BLD AUTO: 9.9 %
NEUTROPHILS # BLD AUTO: 5.23 X10*3/UL (ref 1.6–5.5)
NEUTROPHILS NFR BLD AUTO: 53.8 %
NRBC BLD-RTO: 0 /100 WBCS (ref 0–0)
PLATELET # BLD AUTO: 180 X10*3/UL (ref 150–450)
POTASSIUM SERPL-SCNC: 4.2 MMOL/L (ref 3.5–5.3)
PROT SERPL-MCNC: 7.2 G/DL (ref 6.4–8.2)
PSA SERPL-MCNC: 5.27 NG/ML
RBC # BLD AUTO: 4.92 X10*6/UL (ref 4.5–5.9)
SODIUM SERPL-SCNC: 140 MMOL/L (ref 136–145)
T4 FREE SERPL-MCNC: 1.29 NG/DL (ref 0.78–1.48)
TESTOST SERPL-MCNC: 835 NG/DL (ref 240–1000)
TSH SERPL-ACNC: 4.01 MIU/L (ref 0.44–3.98)
WBC # BLD AUTO: 9.7 X10*3/UL (ref 4.4–11.3)

## 2025-06-24 PROCEDURE — 84153 ASSAY OF PSA TOTAL: CPT

## 2025-06-24 PROCEDURE — 80053 COMPREHEN METABOLIC PANEL: CPT

## 2025-06-24 PROCEDURE — 84443 ASSAY THYROID STIM HORMONE: CPT | Performed by: NURSE PRACTITIONER

## 2025-06-24 PROCEDURE — 85025 COMPLETE CBC W/AUTO DIFF WBC: CPT

## 2025-06-24 PROCEDURE — 84403 ASSAY OF TOTAL TESTOSTERONE: CPT

## 2025-06-24 PROCEDURE — 36415 COLL VENOUS BLD VENIPUNCTURE: CPT

## 2025-06-24 PROCEDURE — 84439 ASSAY OF FREE THYROXINE: CPT | Performed by: NURSE PRACTITIONER

## 2025-06-27 ENCOUNTER — OFFICE VISIT (OUTPATIENT)
Dept: HEMATOLOGY/ONCOLOGY | Facility: HOSPITAL | Age: OVER 89
End: 2025-06-27
Payer: MEDICARE

## 2025-06-27 VITALS
BODY MASS INDEX: 22.65 KG/M2 | HEIGHT: 71 IN | WEIGHT: 161.82 LBS | RESPIRATION RATE: 18 BRPM | DIASTOLIC BLOOD PRESSURE: 62 MMHG | SYSTOLIC BLOOD PRESSURE: 93 MMHG | HEART RATE: 66 BPM | OXYGEN SATURATION: 94 % | TEMPERATURE: 96.6 F

## 2025-06-27 DIAGNOSIS — C61 ADENOCARCINOMA OF PROSTATE (MULTI): ICD-10-CM

## 2025-06-27 PROCEDURE — 1126F AMNT PAIN NOTED NONE PRSNT: CPT | Performed by: INTERNAL MEDICINE

## 2025-06-27 PROCEDURE — 1036F TOBACCO NON-USER: CPT | Performed by: INTERNAL MEDICINE

## 2025-06-27 PROCEDURE — 99214 OFFICE O/P EST MOD 30 MIN: CPT | Performed by: INTERNAL MEDICINE

## 2025-06-27 PROCEDURE — 1159F MED LIST DOCD IN RCRD: CPT | Performed by: INTERNAL MEDICINE

## 2025-06-27 ASSESSMENT — PAIN SCALES - GENERAL: PAINLEVEL_OUTOF10: 0-NO PAIN

## 2025-06-30 ENCOUNTER — APPOINTMENT (OUTPATIENT)
Dept: NEUROLOGY | Facility: CLINIC | Age: OVER 89
End: 2025-06-30
Payer: MEDICARE

## 2025-07-23 ENCOUNTER — LAB (OUTPATIENT)
Dept: LAB | Facility: HOSPITAL | Age: OVER 89
End: 2025-07-23
Payer: MEDICARE

## 2025-07-23 LAB
ALBUMIN SERPL BCP-MCNC: 4.2 G/DL (ref 3.4–5)
ALP SERPL-CCNC: 58 U/L (ref 33–136)
ALT SERPL W P-5'-P-CCNC: 9 U/L (ref 10–52)
ANION GAP SERPL CALC-SCNC: 12 MMOL/L (ref 10–20)
AST SERPL W P-5'-P-CCNC: 16 U/L (ref 9–39)
BASOPHILS # BLD AUTO: 0.06 X10*3/UL (ref 0–0.1)
BASOPHILS NFR BLD AUTO: 0.6 %
BILIRUB SERPL-MCNC: 1.4 MG/DL (ref 0–1.2)
BUN SERPL-MCNC: 22 MG/DL (ref 6–23)
CALCIUM SERPL-MCNC: 8.9 MG/DL (ref 8.6–10.3)
CHLORIDE SERPL-SCNC: 101 MMOL/L (ref 98–107)
CO2 SERPL-SCNC: 30 MMOL/L (ref 21–32)
CREAT SERPL-MCNC: 1.38 MG/DL (ref 0.5–1.3)
EGFRCR SERPLBLD CKD-EPI 2021: 49 ML/MIN/1.73M*2
EOSINOPHIL # BLD AUTO: 0.27 X10*3/UL (ref 0–0.4)
EOSINOPHIL NFR BLD AUTO: 2.6 %
ERYTHROCYTE [DISTWIDTH] IN BLOOD BY AUTOMATED COUNT: 13.4 % (ref 11.5–14.5)
GLUCOSE SERPL-MCNC: 116 MG/DL (ref 74–99)
HCT VFR BLD AUTO: 45.9 % (ref 41–52)
HGB BLD-MCNC: 14.6 G/DL (ref 13.5–17.5)
IMM GRANULOCYTES # BLD AUTO: 0.04 X10*3/UL (ref 0–0.5)
IMM GRANULOCYTES NFR BLD AUTO: 0.4 % (ref 0–0.9)
LYMPHOCYTES # BLD AUTO: 2.76 X10*3/UL (ref 0.8–3)
LYMPHOCYTES NFR BLD AUTO: 26.4 %
MCH RBC QN AUTO: 29.6 PG (ref 26–34)
MCHC RBC AUTO-ENTMCNC: 31.8 G/DL (ref 32–36)
MCV RBC AUTO: 93 FL (ref 80–100)
MONOCYTES # BLD AUTO: 0.81 X10*3/UL (ref 0.05–0.8)
MONOCYTES NFR BLD AUTO: 7.7 %
NEUTROPHILS # BLD AUTO: 6.53 X10*3/UL (ref 1.6–5.5)
NEUTROPHILS NFR BLD AUTO: 62.3 %
NRBC BLD-RTO: 0 /100 WBCS (ref 0–0)
PLATELET # BLD AUTO: 169 X10*3/UL (ref 150–450)
POTASSIUM SERPL-SCNC: 3.9 MMOL/L (ref 3.5–5.3)
PROT SERPL-MCNC: 7.3 G/DL (ref 6.4–8.2)
PSA SERPL-MCNC: 4.53 NG/ML
RBC # BLD AUTO: 4.94 X10*6/UL (ref 4.5–5.9)
SODIUM SERPL-SCNC: 139 MMOL/L (ref 136–145)
TESTOST SERPL-MCNC: 896 NG/DL (ref 240–1000)
WBC # BLD AUTO: 10.5 X10*3/UL (ref 4.4–11.3)

## 2025-07-23 PROCEDURE — 84153 ASSAY OF PSA TOTAL: CPT | Performed by: NURSE PRACTITIONER

## 2025-07-23 PROCEDURE — 85025 COMPLETE CBC W/AUTO DIFF WBC: CPT | Performed by: NURSE PRACTITIONER

## 2025-07-23 PROCEDURE — 84075 ASSAY ALKALINE PHOSPHATASE: CPT | Performed by: NURSE PRACTITIONER

## 2025-07-23 PROCEDURE — 84403 ASSAY OF TOTAL TESTOSTERONE: CPT | Performed by: NURSE PRACTITIONER

## 2025-08-28 ENCOUNTER — OFFICE VISIT (OUTPATIENT)
Dept: UROLOGY | Facility: HOSPITAL | Age: OVER 89
End: 2025-08-28
Payer: MEDICARE

## 2025-08-28 DIAGNOSIS — N40.1 BPH WITH OBSTRUCTION/LOWER URINARY TRACT SYMPTOMS: Primary | ICD-10-CM

## 2025-08-28 DIAGNOSIS — E78.2 MIXED HYPERLIPIDEMIA: ICD-10-CM

## 2025-08-28 DIAGNOSIS — R39.9 LOWER URINARY TRACT SYMPTOMS (LUTS): ICD-10-CM

## 2025-08-28 DIAGNOSIS — N13.8 BPH WITH OBSTRUCTION/LOWER URINARY TRACT SYMPTOMS: Primary | ICD-10-CM

## 2025-08-28 PROCEDURE — 1036F TOBACCO NON-USER: CPT | Performed by: UROLOGY

## 2025-08-28 PROCEDURE — 99212 OFFICE O/P EST SF 10 MIN: CPT | Performed by: UROLOGY

## 2025-08-28 PROCEDURE — G2211 COMPLEX E/M VISIT ADD ON: HCPCS | Performed by: UROLOGY

## 2025-08-28 PROCEDURE — 1159F MED LIST DOCD IN RCRD: CPT | Performed by: UROLOGY

## 2025-08-28 PROCEDURE — 99213 OFFICE O/P EST LOW 20 MIN: CPT | Performed by: UROLOGY

## 2025-08-28 RX ORDER — SIMVASTATIN 40 MG/1
40 TABLET, FILM COATED ORAL
Qty: 90 TABLET | Refills: 3 | Status: SHIPPED | OUTPATIENT
Start: 2025-08-28

## 2025-10-06 ENCOUNTER — APPOINTMENT (OUTPATIENT)
Dept: NEUROLOGY | Facility: CLINIC | Age: OVER 89
End: 2025-10-06
Payer: MEDICARE

## 2025-10-07 ENCOUNTER — APPOINTMENT (OUTPATIENT)
Dept: CARDIOLOGY | Facility: HOSPITAL | Age: OVER 89
End: 2025-10-07
Payer: MEDICARE

## (undated) DEVICE — TUBING, MORCELLATOR PUMP, DISPOSABLE

## (undated) DEVICE — TOWEL, SURGICAL, NEURO, O/R, 16 X 26, BLUE, STERILE

## (undated) DEVICE — SYRINGE, 60 CC, IRRIGATION, PISTON, CATH TIP, W/LUER ADAPTER,DISP

## (undated) DEVICE — PLUG, CATHETER

## (undated) DEVICE — COLLECTION BAG, FLUID, NON-STERILE

## (undated) DEVICE — Device

## (undated) DEVICE — SYRINGE, 50 CC, LUER LOCK

## (undated) DEVICE — TUBING, SUCTION, CONNECTING, STERILE 0.25 X 120 IN., LF

## (undated) DEVICE — CATHETER, URETHRAL, FOLEY, 3 WAY, BARDEX IC, 22 FR, 30 CC, SILVER LATEX

## (undated) DEVICE — IRRIGATION SET, CYSTOSCOPY, TURP, Y, CONTINUOUS, 81 IN

## (undated) DEVICE — BLADE, ROTATION MORCELLATOR, 4.8MM X 385MM, PIRHANA, DISPOSABLE